# Patient Record
Sex: MALE | Race: WHITE | NOT HISPANIC OR LATINO | Employment: FULL TIME | URBAN - METROPOLITAN AREA
[De-identification: names, ages, dates, MRNs, and addresses within clinical notes are randomized per-mention and may not be internally consistent; named-entity substitution may affect disease eponyms.]

---

## 2021-03-03 ENCOUNTER — TELEPHONE (OUTPATIENT)
Dept: INTERNAL MEDICINE | Facility: CLINIC | Age: 61
End: 2021-03-03

## 2021-03-03 ENCOUNTER — OFFICE VISIT (OUTPATIENT)
Dept: INTERNAL MEDICINE | Facility: CLINIC | Age: 61
End: 2021-03-03

## 2021-03-03 VITALS
OXYGEN SATURATION: 95 % | SYSTOLIC BLOOD PRESSURE: 158 MMHG | HEART RATE: 71 BPM | TEMPERATURE: 97.1 F | BODY MASS INDEX: 33.38 KG/M2 | HEIGHT: 72 IN | WEIGHT: 246.4 LBS | DIASTOLIC BLOOD PRESSURE: 98 MMHG

## 2021-03-03 DIAGNOSIS — Z13.220 LIPID SCREENING: ICD-10-CM

## 2021-03-03 DIAGNOSIS — E66.9 OBESITY (BMI 30.0-34.9): ICD-10-CM

## 2021-03-03 DIAGNOSIS — I10 ESSENTIAL HYPERTENSION: ICD-10-CM

## 2021-03-03 DIAGNOSIS — Z11.59 NEED FOR HEPATITIS C SCREENING TEST: ICD-10-CM

## 2021-03-03 DIAGNOSIS — Z12.5 PROSTATE CANCER SCREENING: ICD-10-CM

## 2021-03-03 DIAGNOSIS — Z12.11 SCREEN FOR COLON CANCER: ICD-10-CM

## 2021-03-03 DIAGNOSIS — B35.1 FUNGAL TOENAIL INFECTION: ICD-10-CM

## 2021-03-03 DIAGNOSIS — Z13.1 SCREENING FOR DIABETES MELLITUS: ICD-10-CM

## 2021-03-03 DIAGNOSIS — H93.13 TINNITUS OF BOTH EARS: ICD-10-CM

## 2021-03-03 DIAGNOSIS — G47.33 OBSTRUCTIVE SLEEP APNEA SYNDROME: ICD-10-CM

## 2021-03-03 DIAGNOSIS — R01.1 SYSTOLIC MURMUR: Primary | ICD-10-CM

## 2021-03-03 DIAGNOSIS — H91.93 DECREASED HEARING OF BOTH EARS: ICD-10-CM

## 2021-03-03 DIAGNOSIS — Z86.010 HISTORY OF COLON POLYPS: ICD-10-CM

## 2021-03-03 PROBLEM — F84.5 ASPERGER SYNDROME: Status: RESOLVED | Noted: 2021-03-03 | Resolved: 2021-03-03

## 2021-03-03 PROBLEM — Z86.0100 HISTORY OF COLON POLYPS: Status: ACTIVE | Noted: 2021-03-03

## 2021-03-03 PROBLEM — F84.5 ASPERGER SYNDROME: Status: ACTIVE | Noted: 2021-03-03

## 2021-03-03 PROBLEM — E66.811 OBESITY (BMI 30.0-34.9): Status: ACTIVE | Noted: 2021-03-03

## 2021-03-03 PROCEDURE — 99205 OFFICE O/P NEW HI 60 MIN: CPT | Performed by: STUDENT IN AN ORGANIZED HEALTH CARE EDUCATION/TRAINING PROGRAM

## 2021-03-03 PROCEDURE — 93000 ELECTROCARDIOGRAM COMPLETE: CPT | Performed by: STUDENT IN AN ORGANIZED HEALTH CARE EDUCATION/TRAINING PROGRAM

## 2021-03-03 RX ORDER — LISINOPRIL 20 MG/1
20 TABLET ORAL DAILY
Qty: 30 TABLET | Refills: 2 | Status: SHIPPED | OUTPATIENT
Start: 2021-03-03 | End: 2021-03-03 | Stop reason: SDUPTHER

## 2021-03-03 RX ORDER — TERBINAFINE HYDROCHLORIDE 250 MG/1
250 TABLET ORAL DAILY
Qty: 30 TABLET | Refills: 2 | Status: SHIPPED | OUTPATIENT
Start: 2021-03-03 | End: 2021-04-09 | Stop reason: SDUPTHER

## 2021-03-03 RX ORDER — TERBINAFINE HYDROCHLORIDE 250 MG/1
250 TABLET ORAL DAILY
Qty: 30 TABLET | Refills: 2 | Status: SHIPPED | OUTPATIENT
Start: 2021-03-03 | End: 2021-03-03 | Stop reason: SDUPTHER

## 2021-03-03 RX ORDER — LISINOPRIL 20 MG/1
20 TABLET ORAL DAILY
Qty: 30 TABLET | Refills: 2 | Status: SHIPPED | OUTPATIENT
Start: 2021-03-03 | End: 2021-04-09 | Stop reason: SDUPTHER

## 2021-03-03 NOTE — PROGRESS NOTES
"History of Present Illness  Sigifredo Walsh is a 60 y.o. male presenting for Tinnitus and Nail Problem.     Patient is here to establish care. Lives with wife and 2 sons. Works as .  His son with Asperger is here today with him.    Patient had tinnitus for years, also noticed decreased hearing in social settings and watching TV.  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    He also has been noticing worsening toenail fungus of feet, worse on his right side.  He states he was on a 2-week course of oral medication in the past, but did not resolve.    He does have a past medical history of sleep apnea on CPAP, which he uses consistently.  Also osteoarthritis of the knee, diverticulosis and cataracts bilaterally.  He has had right inguinal hernia repair.  Also he has removed ganglion cyst of his right third finger in the 90s.    The following portions of the patient's history were reviewed and updated as appropriate: allergies, current medications, past family history, past medical history, past social history, past surgical history and problem list.    Review of Systems   Constitutional: Negative for fever.   HENT: Negative for congestion.    Eyes: Positive for visual disturbance.   Respiratory: Negative for shortness of breath.    Cardiovascular: Negative for chest pain.   Gastrointestinal: Negative for abdominal pain.   Genitourinary: Negative for dysuria.   Skin: Positive for rash.        Jock Itch   Neurological: Negative for weakness.   Psychiatric/Behavioral: Negative for depressed mood.       Objective  /98   Pulse 71   Temp 97.1 °F (36.2 °C) (Temporal)   Ht 181.6 cm (71.5\") Comment: per patient  Wt 112 kg (246 lb 6.4 oz)   SpO2 95%   BMI 33.89 kg/m²     Physical Exam  Vitals signs reviewed.   Constitutional:       Appearance: Normal appearance.   HENT:      Head: Normocephalic and atraumatic.      " Nose: Nose normal. No congestion.      Mouth/Throat:      Mouth: Mucous membranes are moist.   Eyes:      Extraocular Movements: Extraocular movements intact.      Conjunctiva/sclera: Conjunctivae normal.   Neck:      Musculoskeletal: Neck supple.   Cardiovascular:      Rate and Rhythm: Normal rate and regular rhythm.      Heart sounds: Murmur present.      Comments: Systolic murmur about 4/6 pct max over apex, radiating to left axilla.  Pulmonary:      Effort: Pulmonary effort is normal.      Breath sounds: Normal breath sounds.   Abdominal:      General: There is no distension.      Palpations: Abdomen is soft. There is no mass.      Tenderness: There is no abdominal tenderness.   Musculoskeletal:      Right lower leg: Edema present.      Left lower leg: Edema present.      Comments: Right foot toenails thickened with subungual hyperkeratosis. Minor changes left side.   Skin:     General: Skin is warm and dry.   Neurological:      Mental Status: He is alert and oriented to person, place, and time. Mental status is at baseline.   Psychiatric:         Behavior: Behavior normal.         Thought Content: Thought content normal.           ECG 12 Lead    Date/Time: 3/3/2021 11:05 AM  Performed by: Jasper Zhao MD  Authorized by: Jasper Zhao MD   Comparison: not compared with previous ECG   Previous ECG: no previous ECG available  Rate: normal  BPM: 62  Conduction: conduction normal  ST Segments: ST segments normal  T Waves: T waves normal  Other findings: left ventricular hypertrophy    Clinical impression: non-specific ECG  Comments: Possible LVH              Assessment/Plan   1. Systolic murmur  Patient denies history of murmur.  Possible mitral insufficiency.  No chest pain or near syncope.  EKG shows possible LVH.  Will refer for cardiology work-up.  - ECG 12 Lead  - Ambulatory Referral to Cardiology  - CBC & Differential; Future    2. Essential hypertension  BP Readings from Last 3 Encounters:    03/03/21 158/98   Meets criteria for hypertension, possible LVH.  - Comprehensive Metabolic Panel; Future  - lisinopril (PRINIVIL,ZESTRIL) 20 MG tablet; Take 1 tablet by mouth Daily.  Dispense: 30 tablet; Refill: 2    3. Lipid screening  Patient will return for fasting lipids.  - Lipid Panel; Future    4. Tinnitus of both ears  5. Decreased hearing of both ears  May be related to hearing loss.  No cerumen impaction.  Will refer to ENT.  - Ambulatory Referral to ENT (Otolaryngology)    6. Obstructive sleep apnea syndrome  Stable, continue on CPAP.    7. Fungal toenail infection  We will need at least 3 months of oral treatment.  Will return tomorrow for CMP, repeat CMP in 1 month.  Patient made aware of liver side effects.  - terbinafine (LamISIL) 250 MG tablet; Take 1 tablet by mouth Daily.  Dispense: 30 tablet; Refill: 2    8. Obesity (BMI 30.0-34.9)  Currently BMI 33.89.  Patient has gained weight during the pandemic, is counseled on weight loss, exercise, healthy diet including healthy plate, reducing portion sizes, avoiding snacking in between meals or instead eating healthy snacks.  Avoiding sugary drinks.    9. Screen for colon cancer  10. History of colon polyps  Had colonoscopy in 2014.  Will refer for repeat colonoscopy due to polyps.  - Amb referral for Screening Colonoscopy    11. Prostate cancer screening  Normal PSA in the past around 1.7-1.9.  Patient would like PSA screening.  Discussed pros and cons, including potentially unnecessary work-up, but with elevated PSA the benefit would be early discovery of cancer.  He would like to proceed with PSA screening.  - PSA Screen; Future    12. Need for hepatitis C screening test  Per new recommendations.  - Hepatitis C Antibody; Future    13. Screening for diabetes mellitus  - Hemoglobin A1c; Future    Total time spent 3 charting, charting and face-to-face with patient 70 minutes    Return in about 1 year (around 3/3/2022) for Annual physical.    Future  Appointments       Provider Department Center    4/7/2021 8:00 AM Jasper Zhao MD [facility]            Jasper Zhao MD  Family Medicine  03/03/2021    Note: Speech recognition transcription software may have been used to create portions of this document.  An attempt at proofreading has been made but errors in transcription could still be present.

## 2021-03-03 NOTE — TELEPHONE ENCOUNTER
PATIENT CALLED TO CHANGE HIS PHARMACY.    HE HAD AN APPOINTMENT TODAY AND 2 MEDICATIONS WAS CALLED INTO [facility] BUT HE NEEDS THAT CANCELLED AND THEY NEED TO BE SENT TO THE PHARMACY LISTED BELOW     lisinopril (PRINIVIL,ZESTRIL) 20 MG tablet  terbinafine (LamISIL) 250 MG tablet    [facility] [address] [facility] [phone] [facility] [phone] FX    PATIENT WOULD LIKE TO  THESE MEDICATIONS TODAY 03/03/21

## 2021-03-08 ENCOUNTER — LAB (OUTPATIENT)
Dept: LAB | Facility: HOSPITAL | Age: 61
End: 2021-03-08

## 2021-03-08 ENCOUNTER — TELEPHONE (OUTPATIENT)
Dept: INTERNAL MEDICINE | Facility: CLINIC | Age: 61
End: 2021-03-08

## 2021-03-08 DIAGNOSIS — R01.1 SYSTOLIC MURMUR: ICD-10-CM

## 2021-03-08 DIAGNOSIS — Z13.1 SCREENING FOR DIABETES MELLITUS: ICD-10-CM

## 2021-03-08 DIAGNOSIS — D72.829 LEUKOCYTOSIS, UNSPECIFIED TYPE: Primary | ICD-10-CM

## 2021-03-08 DIAGNOSIS — Z12.5 PROSTATE CANCER SCREENING: ICD-10-CM

## 2021-03-08 DIAGNOSIS — D72.829 LEUKOCYTOSIS, UNSPECIFIED TYPE: ICD-10-CM

## 2021-03-08 DIAGNOSIS — I10 ESSENTIAL HYPERTENSION: ICD-10-CM

## 2021-03-08 DIAGNOSIS — Z11.59 NEED FOR HEPATITIS C SCREENING TEST: ICD-10-CM

## 2021-03-08 DIAGNOSIS — D72.820 LYMPHOCYTOSIS: Primary | ICD-10-CM

## 2021-03-08 DIAGNOSIS — Z13.220 LIPID SCREENING: ICD-10-CM

## 2021-03-08 PROBLEM — R73.03 PREDIABETES: Status: ACTIVE | Noted: 2021-03-08

## 2021-03-08 LAB
ALBUMIN SERPL-MCNC: 4.1 G/DL (ref 3.5–5.2)
ALBUMIN/GLOB SERPL: 1.8 G/DL
ALP SERPL-CCNC: 49 U/L (ref 39–117)
ALT SERPL W P-5'-P-CCNC: 19 U/L (ref 1–41)
ANION GAP SERPL CALCULATED.3IONS-SCNC: 8.5 MMOL/L (ref 5–15)
AST SERPL-CCNC: 18 U/L (ref 1–40)
BILIRUB SERPL-MCNC: 0.5 MG/DL (ref 0–1.2)
BUN SERPL-MCNC: 16 MG/DL (ref 8–23)
BUN/CREAT SERPL: 15.2 (ref 7–25)
CALCIUM SPEC-SCNC: 9.1 MG/DL (ref 8.6–10.5)
CHLORIDE SERPL-SCNC: 105 MMOL/L (ref 98–107)
CHOLEST SERPL-MCNC: 101 MG/DL (ref 0–200)
CO2 SERPL-SCNC: 27.5 MMOL/L (ref 22–29)
CREAT SERPL-MCNC: 1.05 MG/DL (ref 0.76–1.27)
CRP SERPL-MCNC: <0.3 MG/DL (ref 0–0.5)
DEPRECATED RDW RBC AUTO: 43.5 FL (ref 37–54)
ERYTHROCYTE [DISTWIDTH] IN BLOOD BY AUTOMATED COUNT: 13 % (ref 12.3–15.4)
GFR SERPL CREATININE-BSD FRML MDRD: 72 ML/MIN/1.73
GLOBULIN UR ELPH-MCNC: 2.3 GM/DL
GLUCOSE SERPL-MCNC: 119 MG/DL (ref 65–99)
HBA1C MFR BLD: 6.49 % (ref 4.8–5.6)
HCT VFR BLD AUTO: 41 % (ref 37.5–51)
HCV AB SER DONR QL: NORMAL
HDLC SERPL-MCNC: 29 MG/DL (ref 40–60)
HGB BLD-MCNC: 14.1 G/DL (ref 13–17.7)
LDLC SERPL CALC-MCNC: 51 MG/DL (ref 0–100)
LDLC/HDLC SERPL: 1.7 {RATIO}
LYMPHOCYTES # BLD MANUAL: 32.04 10*3/MM3 (ref 0.7–3.1)
LYMPHOCYTES NFR BLD MANUAL: 5 % (ref 5–12)
LYMPHOCYTES NFR BLD MANUAL: 81 % (ref 19.6–45.3)
MCH RBC QN AUTO: 32 PG (ref 26.6–33)
MCHC RBC AUTO-ENTMCNC: 34.4 G/DL (ref 31.5–35.7)
MCV RBC AUTO: 93.2 FL (ref 79–97)
MONOCYTES # BLD AUTO: 1.98 10*3/MM3 (ref 0.1–0.9)
NEUTROPHILS # BLD AUTO: 4.35 10*3/MM3 (ref 1.7–7)
NEUTROPHILS NFR BLD MANUAL: 11 % (ref 42.7–76)
PATHOLOGY REVIEW: YES
PLAT MORPH BLD: NORMAL
PLATELET # BLD AUTO: 138 10*3/MM3 (ref 140–450)
PMV BLD AUTO: 10.3 FL (ref 6–12)
POTASSIUM SERPL-SCNC: 4.4 MMOL/L (ref 3.5–5.2)
PROT SERPL-MCNC: 6.4 G/DL (ref 6–8.5)
PSA SERPL-MCNC: 3.69 NG/ML (ref 0–4)
RBC # BLD AUTO: 4.4 10*6/MM3 (ref 4.14–5.8)
RBC MORPH BLD: NORMAL
SODIUM SERPL-SCNC: 141 MMOL/L (ref 136–145)
TRIGL SERPL-MCNC: 113 MG/DL (ref 0–150)
VARIANT LYMPHS NFR BLD MANUAL: 3 % (ref 0–5)
VLDLC SERPL-MCNC: 21 MG/DL (ref 5–40)
WBC # BLD AUTO: 39.56 10*3/MM3 (ref 3.4–10.8)
WBC MORPH BLD: NORMAL

## 2021-03-08 PROCEDURE — 86140 C-REACTIVE PROTEIN: CPT

## 2021-03-08 PROCEDURE — G0103 PSA SCREENING: HCPCS

## 2021-03-08 PROCEDURE — 85025 COMPLETE CBC W/AUTO DIFF WBC: CPT

## 2021-03-08 PROCEDURE — 83036 HEMOGLOBIN GLYCOSYLATED A1C: CPT

## 2021-03-08 PROCEDURE — 85007 BL SMEAR W/DIFF WBC COUNT: CPT

## 2021-03-08 PROCEDURE — 80053 COMPREHEN METABOLIC PANEL: CPT

## 2021-03-08 PROCEDURE — 36415 COLL VENOUS BLD VENIPUNCTURE: CPT

## 2021-03-08 PROCEDURE — 86803 HEPATITIS C AB TEST: CPT

## 2021-03-08 PROCEDURE — 80061 LIPID PANEL: CPT

## 2021-03-10 NOTE — PROGRESS NOTES
"Chief Complaint  Heart Murmur and Establish Care    Subjective    History of Present Illness {CC  Problem List  Visit  Diagnosis   Encounters  Notes  Medications  Labs  Result Review Imaging  Media :23}     Sigifredo Walsh presents to [facility] for   History of Present Illness   60-year-old male with prediabetes, sleep apnea who presents today for evaluation of systolic heart murmur at the request of Dr. Zhao.  Patient recently established care with Dr. Zhao and was found to have a systolic murmur as well as hypertension.  He was started on lisinopril 20 mg daily. EKG showed evidence of LVH.  He was found to have a concerning CBC with a white blood count of approximately 40 with atypical lymphocytosis.  He has been referred to hematology/oncology and sees them next week.   He reports that he has gained 20lbs over the past year and has had some MYERS. He relates this because he has been more sedentary since working from home. He notes intermittent swelling of his BLE depending on how much sodium he consumes, he says it goes away after he \"chugs\" some water.  He denies any orthopnea or PND as long as he wears his CPAP, which she does every night.  He denies any chest pain.  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  He has never had a cardiac evaluation that he is aware of.  He thinks at one point he was told he had a heart attack in the 1980s but he is not sure about the details surrounding this.  Never had a heart cath.  Has never had an echo.  No history of rheumatic fever.  Denies palpitations.  Objective     Vital Signs:   Vitals:    03/11/21 1306 03/11/21 1307 03/11/21 1308   BP: 134/84 141/83 145/83   BP Location: Right arm Left arm Left arm   Patient Position: Sitting Sitting Standing   Cuff Size: Adult Adult Adult   Pulse: 68 67 65   Resp:   14   Temp:   98.1 °F (36.7 °C) " "  TempSrc:   Temporal   SpO2: 96% 96% 96%   Weight:   111 kg (244 lb 2 oz)   Height:   181.6 cm (71.5\")     Body mass index is 33.57 kg/m².  Physical Exam  Vitals reviewed.   Constitutional:       Appearance: Normal appearance.   HENT:      Head: Normocephalic.   Eyes:      Extraocular Movements: Extraocular movements intact.      Pupils: Pupils are equal, round, and reactive to light.   Neck:      Vascular: No carotid bruit.   Cardiovascular:      Rate and Rhythm: Normal rate and regular rhythm.      Pulses: Normal pulses.      Heart sounds: S1 normal and S2 normal. Murmur present. Systolic murmur present with a grade of 3/6.   Pulmonary:      Effort: Pulmonary effort is normal. No respiratory distress.      Breath sounds: Normal breath sounds.   Chest:      Chest wall: No tenderness.   Abdominal:      General: Abdomen is flat. Bowel sounds are normal.      Palpations: Abdomen is soft.   Musculoskeletal:      Cervical back: Neck supple.      Right lower le+ Pitting Edema present.      Left lower le+ Pitting Edema present.   Skin:     General: Skin is warm and dry.   Neurological:      General: No focal deficit present.      Mental Status: He is alert and oriented to person, place, and time. Mental status is at baseline.   Psychiatric:         Mood and Affect: Mood normal.         Behavior: Behavior normal.         Thought Content: Thought content normal.              Result Review  Data Reviewed:{ Labs  Result Review  Imaging  Med Tab  Media :23}   ECG 12 Lead (2021 09:45)  Lipid Panel (2021 08:30)  Comprehensive Metabolic Panel (2021 08:30)  Hemoglobin A1c (2021 08:30)  Hepatitis C Antibody (2021 08:30)  PSA Screen (2021 08:30)  CBC & Differential (2021 08:30)  Manual Differential (2021 08:30)  C-reactive Protein (2021 08:30)    Consultant notes Dr. Zhao       EKG today shows normal sinus rhythm, heart rate 68, KS interval 180 ms,    "   Assessment and Plan {CC Problem List  Visit Diagnosis  ROS  Review (Popup)  Health Maintenance  Quality  BestPractice  Medications  SmartSets  SnapShot Encounters  Media :23}   1. Essential hypertension  Appears close to control. Will start chlorthalidone 25mg daily due to edema and for better blood pressure control.  Repeat BMP at time of echo or at follow-up with PCP  Advised him to start checking his blood pressures at home and bring his readings to his upcoming follow-up with his PCP  - Adult Transthoracic Echo Complete W/ Cont if Necessary Per Protocol; Future  - Basic Metabolic Panel; Future  - Magnesium; Future    2. Systolic murmur  We will get an echo in the next couple weeks, murmur highly suspicious for valvular heart disease.   - Adult Transthoracic Echo Complete W/ Cont if Necessary Per Protocol; Future    3. Abnormal EKG  Original EKG showed a supraventricular rhythm with unclear SC intervals and questionable delta waves.  Repeat EKG today shows normal sinus rhythm  - ECG 12 Lead; Future    4. MYERS (dyspnea on exertion)  - Adult Transthoracic Echo Complete W/ Cont if Necessary Per Protocol; Future  - Basic Metabolic Panel; Future  - proBNP; Future  - Magnesium; Future    5. Bilateral lower extremity edema  Start chlorthalidone.  May need to consider Lasix depending on how he responds to chlorthalidone and echo results.  - Adult Transthoracic Echo Complete W/ Cont if Necessary Per Protocol; Future  - proBNP; Future        Follow Up {Instructions Charge Capture  Follow-up Communications :23}   No follow-ups on file.    Patient was given instructions and counseling regarding his condition or for health maintenance advice. Please see specific information pulled into the AVS if appropriate.  Patient was instructed to call the [facility] with any questions, concerns, or worsening symptoms.    *Please note that portions of this note were completed with a voice recognition program.  Efforts were made to edit the dictations, but occasionally words are mistranscribed.

## 2021-03-11 ENCOUNTER — HOSPITAL ENCOUNTER (OUTPATIENT)
Dept: CARDIOLOGY | Facility: HOSPITAL | Age: 61
Discharge: HOME | End: 2021-03-11

## 2021-03-11 ENCOUNTER — OFFICE VISIT (OUTPATIENT)
Dept: CARDIOLOGY | Facility: HOSPITAL | Age: 61
End: 2021-03-11

## 2021-03-11 VITALS
WEIGHT: 244.13 LBS | HEIGHT: 72 IN | SYSTOLIC BLOOD PRESSURE: 145 MMHG | TEMPERATURE: 98.1 F | RESPIRATION RATE: 14 BRPM | BODY MASS INDEX: 33.07 KG/M2 | DIASTOLIC BLOOD PRESSURE: 83 MMHG | HEART RATE: 65 BPM | OXYGEN SATURATION: 96 %

## 2021-03-11 DIAGNOSIS — R94.31 ABNORMAL EKG: ICD-10-CM

## 2021-03-11 DIAGNOSIS — R06.09 DOE (DYSPNEA ON EXERTION): ICD-10-CM

## 2021-03-11 DIAGNOSIS — R60.0 BILATERAL LOWER EXTREMITY EDEMA: ICD-10-CM

## 2021-03-11 DIAGNOSIS — R01.1 SYSTOLIC MURMUR: ICD-10-CM

## 2021-03-11 DIAGNOSIS — I10 ESSENTIAL HYPERTENSION: Primary | ICD-10-CM

## 2021-03-11 PROCEDURE — 99204 OFFICE O/P NEW MOD 45 MIN: CPT | Performed by: NURSE PRACTITIONER

## 2021-03-11 RX ORDER — CHLORTHALIDONE 25 MG/1
25 TABLET ORAL DAILY
Qty: 30 TABLET | Refills: 0 | Status: SHIPPED | OUTPATIENT
Start: 2021-03-11 | End: 2021-04-09 | Stop reason: SDUPTHER

## 2021-03-12 LAB
LAB AP CASE REPORT: NORMAL
PATH REPORT.ADDENDUM SPEC: NORMAL
PATH REPORT.FINAL DX SPEC: NORMAL
PATH REPORT.GROSS SPEC: NORMAL

## 2021-03-17 PROBLEM — H90.3 SENSORINEURAL HEARING LOSS (SNHL) OF BOTH EARS: Status: ACTIVE | Noted: 2021-03-17

## 2021-03-18 ENCOUNTER — TELEPHONE (OUTPATIENT)
Dept: CARDIOLOGY | Facility: HOSPITAL | Age: 61
End: 2021-03-18

## 2021-03-18 ENCOUNTER — HOSPITAL ENCOUNTER (OUTPATIENT)
Dept: CARDIOLOGY | Facility: HOSPITAL | Age: 61
Discharge: HOME | End: 2021-03-18
Admitting: NURSE PRACTITIONER

## 2021-03-18 VITALS — HEIGHT: 71 IN | WEIGHT: 244 LBS | BODY MASS INDEX: 34.16 KG/M2

## 2021-03-18 DIAGNOSIS — R06.09 DOE (DYSPNEA ON EXERTION): ICD-10-CM

## 2021-03-18 DIAGNOSIS — R01.1 SYSTOLIC MURMUR: ICD-10-CM

## 2021-03-18 DIAGNOSIS — I10 ESSENTIAL HYPERTENSION: ICD-10-CM

## 2021-03-18 DIAGNOSIS — R60.0 BILATERAL LOWER EXTREMITY EDEMA: ICD-10-CM

## 2021-03-18 PROBLEM — I34.0 NONRHEUMATIC MITRAL VALVE REGURGITATION: Status: ACTIVE | Noted: 2021-03-03

## 2021-03-18 LAB
BH CV ECHO MEAS - AI DEC SLOPE: 228 CM/SEC^2
BH CV ECHO MEAS - AI MAX PG: 80.6 MMHG
BH CV ECHO MEAS - AI MAX VEL: 449 CM/SEC
BH CV ECHO MEAS - AI P1/2T: 576.8 MSEC
BH CV ECHO MEAS - AO ROOT AREA (BSA CORRECTED): 1.7
BH CV ECHO MEAS - AO ROOT AREA: 11.9 CM^2
BH CV ECHO MEAS - AO ROOT DIAM: 3.9 CM
BH CV ECHO MEAS - BSA(HAYCOCK): 2.4 M^2
BH CV ECHO MEAS - BSA: 2.3 M^2
BH CV ECHO MEAS - BZI_BMI: 34 KILOGRAMS/M^2
BH CV ECHO MEAS - BZI_METRIC_HEIGHT: 180.3 CM
BH CV ECHO MEAS - BZI_METRIC_WEIGHT: 110.7 KG
BH CV ECHO MEAS - EDV(CUBED): 152.3 ML
BH CV ECHO MEAS - EDV(MOD-SP2): 165 ML
BH CV ECHO MEAS - EDV(MOD-SP4): 227 ML
BH CV ECHO MEAS - EDV(TEICH): 137.7 ML
BH CV ECHO MEAS - EF(CUBED): 73.7 %
BH CV ECHO MEAS - EF(MOD-SP2): 47.9 %
BH CV ECHO MEAS - EF(MOD-SP4): 50.7 %
BH CV ECHO MEAS - EF(TEICH): 65.1 %
BH CV ECHO MEAS - ESV(CUBED): 40 ML
BH CV ECHO MEAS - ESV(MOD-SP2): 86 ML
BH CV ECHO MEAS - ESV(MOD-SP4): 112 ML
BH CV ECHO MEAS - ESV(TEICH): 48.1 ML
BH CV ECHO MEAS - FS: 36 %
BH CV ECHO MEAS - IVS/LVPW: 1
BH CV ECHO MEAS - IVSD: 1.2 CM
BH CV ECHO MEAS - LA DIMENSION: 4 CM
BH CV ECHO MEAS - LA/AO: 1
BH CV ECHO MEAS - LAD MAJOR: 5.4 CM
BH CV ECHO MEAS - LAT PEAK E' VEL: 10.2 CM/SEC
BH CV ECHO MEAS - LATERAL E/E' RATIO: 8.5
BH CV ECHO MEAS - LV DIASTOLIC VOL/BSA (35-75): 98.9 ML/M^2
BH CV ECHO MEAS - LV MASS(C)D: 242 GRAMS
BH CV ECHO MEAS - LV MASS(C)DI: 105.5 GRAMS/M^2
BH CV ECHO MEAS - LV MAX PG: 3.6 MMHG
BH CV ECHO MEAS - LV MEAN PG: 2 MMHG
BH CV ECHO MEAS - LV SYSTOLIC VOL/BSA (12-30): 48.8 ML/M^2
BH CV ECHO MEAS - LV V1 MAX: 94.7 CM/SEC
BH CV ECHO MEAS - LV V1 MEAN: 65.4 CM/SEC
BH CV ECHO MEAS - LV V1 VTI: 19.7 CM
BH CV ECHO MEAS - LVIDD: 5.3 CM
BH CV ECHO MEAS - LVIDS: 3.4 CM
BH CV ECHO MEAS - LVLD AP2: 9 CM
BH CV ECHO MEAS - LVLD AP4: 9.3 CM
BH CV ECHO MEAS - LVLS AP2: 7.7 CM
BH CV ECHO MEAS - LVLS AP4: 7.7 CM
BH CV ECHO MEAS - LVOT AREA (M): 3.8 CM^2
BH CV ECHO MEAS - LVOT AREA: 3.8 CM^2
BH CV ECHO MEAS - LVOT DIAM: 2.2 CM
BH CV ECHO MEAS - LVPWD: 1.1 CM
BH CV ECHO MEAS - MED PEAK E' VEL: 8.4 CM/SEC
BH CV ECHO MEAS - MEDIAL E/E' RATIO: 10.2
BH CV ECHO MEAS - MR ALIAS VEL: 39 CM/SEC
BH CV ECHO MEAS - MR ERO: 0.2 CM^2
BH CV ECHO MEAS - MR FLOW RATE: 74.1 CM^3/SEC
BH CV ECHO MEAS - MR MAX PG: 54 MMHG
BH CV ECHO MEAS - MR MAX VEL: 368 CM/SEC
BH CV ECHO MEAS - MR MEAN PG: 30 MMHG
BH CV ECHO MEAS - MR MEAN VEL: 260 CM/SEC
BH CV ECHO MEAS - MR PISA RADIUS: 0.55 CM
BH CV ECHO MEAS - MR PISA: 1.9 CM^2
BH CV ECHO MEAS - MR VOLUME: 20.9 ML
BH CV ECHO MEAS - MR VTI: 104 CM
BH CV ECHO MEAS - MV A MAX VEL: 67.6 CM/SEC
BH CV ECHO MEAS - MV AREA (1 DIAM): 3.8 CM^2
BH CV ECHO MEAS - MV DEC SLOPE: 347 CM/SEC^2
BH CV ECHO MEAS - MV DEC TIME: 0.24 SEC
BH CV ECHO MEAS - MV DIAM: 2.2 CM
BH CV ECHO MEAS - MV E MAX VEL: 86.4 CM/SEC
BH CV ECHO MEAS - MV E/A: 1.3
BH CV ECHO MEAS - MV FLOW AREA(1DIAM): 3.8 CM^2
BH CV ECHO MEAS - MV MAX PG: 7 MMHG
BH CV ECHO MEAS - MV MEAN PG: 3 MMHG
BH CV ECHO MEAS - MV P1/2T MAX VEL: 128 CM/SEC
BH CV ECHO MEAS - MV P1/2T: 108 MSEC
BH CV ECHO MEAS - MV V2 MAX: 132 CM/SEC
BH CV ECHO MEAS - MV V2 MEAN: 75.7 CM/SEC
BH CV ECHO MEAS - MV V2 VTI: 45.2 CM
BH CV ECHO MEAS - MVA P1/2T LCG: 1.7 CM^2
BH CV ECHO MEAS - MVA(P1/2T): 2 CM^2
BH CV ECHO MEAS - MVA(VTI): 1.7 CM^2
BH CV ECHO MEAS - PA ACC SLOPE: 634 CM/SEC^2
BH CV ECHO MEAS - PA ACC TIME: 0.14 SEC
BH CV ECHO MEAS - PA PR(ACCEL): 15.6 MMHG
BH CV ECHO MEAS - RAP SYSTOLE: 3 MMHG
BH CV ECHO MEAS - RF(MV,LVOT)(1DIAM): 0.56
BH CV ECHO MEAS - RVSP: 25 MMHG
BH CV ECHO MEAS - SI(CUBED): 48.9 ML/M^2
BH CV ECHO MEAS - SI(LVOT): 32.6 ML/M^2
BH CV ECHO MEAS - SI(MOD-SP2): 34.4 ML/M^2
BH CV ECHO MEAS - SI(MOD-SP4): 50.1 ML/M^2
BH CV ECHO MEAS - SI(MV 1 DIAM): 74.9 ML/M^2
BH CV ECHO MEAS - SI(TEICH): 39 ML/M^2
BH CV ECHO MEAS - SV(CUBED): 112.3 ML
BH CV ECHO MEAS - SV(LVOT): 74.9 ML
BH CV ECHO MEAS - SV(MOD-SP2): 79 ML
BH CV ECHO MEAS - SV(MOD-SP4): 115 ML
BH CV ECHO MEAS - SV(MV 1 DIAM): 171.8 ML
BH CV ECHO MEAS - SV(TEICH): 89.6 ML
BH CV ECHO MEAS - TAPSE (>1.6): 3.1 CM
BH CV ECHO MEAS - TR MAX PG: 22 MMHG
BH CV ECHO MEAS - TR MAX VEL: 236.8 CM/SEC
BH CV ECHO MEASUREMENTS AVERAGE E/E' RATIO: 9.29
BH CV VAS BP LEFT ARM: NORMAL MMHG
BH CV XLRA - RV BASE: 4.6 CM
BH CV XLRA - RV LENGTH: 5.8 CM
BH CV XLRA - RV MID: 3.5 CM
BH CV XLRA - TDI S': 15.4 CM/SEC
LEFT ATRIUM VOLUME INDEX: 37.9 ML/M^2
LEFT ATRIUM VOLUME: 87 ML
MAXIMAL PREDICTED HEART RATE: 158 BPM
STRESS TARGET HR: 134 BPM

## 2021-03-18 PROCEDURE — 93306 TTE W/DOPPLER COMPLETE: CPT

## 2021-03-18 PROCEDURE — 93306 TTE W/DOPPLER COMPLETE: CPT | Performed by: INTERNAL MEDICINE

## 2021-03-18 NOTE — TELEPHONE ENCOUNTER
Called patient with the echo results. EF normal, mild to moderate valve regurgitation. He says he is doing much better on the chlorthalidone. Swelling as resolved. He says he only checks BP occasionally at home, most of the time it is 140 systolic, but he believes it is improving.  He reports that he feels great and has not felt this well in years.  He is scheduled to see Dr. Saunders with oncology tomorrow.  He plans to have labs at that time.  Advised to continue to monitor blood pressure.  Discussed the potential of possibly need to increase lisinopril and his wife is very against this because she feels that this was the culprit of his swelling to begin with.  They both would prefer to continue to monitor the blood pressure until he sees Dr. Zhao, which I think is reasonable.  I advised him to call me with any new or worsening symptoms.  Discussed repeating echo possibly in about a year just to monitor the mitral valve.  He verbalized understanding with no further questions or concerns

## 2021-03-19 ENCOUNTER — CONSULT (OUTPATIENT)
Dept: ONCOLOGY | Facility: CLINIC | Age: 61
End: 2021-03-19

## 2021-03-19 ENCOUNTER — TELEPHONE (OUTPATIENT)
Dept: CARDIOLOGY | Facility: HOSPITAL | Age: 61
End: 2021-03-19

## 2021-03-19 ENCOUNTER — LAB (OUTPATIENT)
Dept: LAB | Facility: HOSPITAL | Age: 61
End: 2021-03-19

## 2021-03-19 VITALS
BODY MASS INDEX: 33.32 KG/M2 | OXYGEN SATURATION: 96 % | DIASTOLIC BLOOD PRESSURE: 71 MMHG | SYSTOLIC BLOOD PRESSURE: 124 MMHG | HEIGHT: 71 IN | TEMPERATURE: 96.9 F | HEART RATE: 62 BPM | WEIGHT: 238 LBS | RESPIRATION RATE: 18 BRPM

## 2021-03-19 DIAGNOSIS — C91.10 CLL (CHRONIC LYMPHOCYTIC LEUKEMIA) (HCC): Primary | ICD-10-CM

## 2021-03-19 DIAGNOSIS — C91.10 CLL (CHRONIC LYMPHOCYTIC LEUKEMIA) (HCC): ICD-10-CM

## 2021-03-19 DIAGNOSIS — M19.90 ARTHRITIS: ICD-10-CM

## 2021-03-19 DIAGNOSIS — R06.09 DOE (DYSPNEA ON EXERTION): ICD-10-CM

## 2021-03-19 DIAGNOSIS — I10 ESSENTIAL HYPERTENSION: ICD-10-CM

## 2021-03-19 DIAGNOSIS — R60.0 BILATERAL LOWER EXTREMITY EDEMA: ICD-10-CM

## 2021-03-19 LAB
ALBUMIN SERPL-MCNC: 4.2 G/DL (ref 3.5–5.2)
ALBUMIN/GLOB SERPL: 1.6 G/DL
ALP SERPL-CCNC: 54 U/L (ref 39–117)
ALT SERPL W P-5'-P-CCNC: 18 U/L (ref 1–41)
ANION GAP SERPL CALCULATED.3IONS-SCNC: 5 MMOL/L (ref 5–15)
AST SERPL-CCNC: 15 U/L (ref 1–40)
B2 MICROGLOB SERPL-MCNC: 3.5 MG/L (ref 0.8–2.2)
BILIRUB SERPL-MCNC: 0.6 MG/DL (ref 0–1.2)
BUN SERPL-MCNC: 21 MG/DL (ref 8–23)
BUN/CREAT SERPL: 17.6 (ref 7–25)
CALCIUM SPEC-SCNC: 9.6 MG/DL (ref 8.6–10.5)
CHLORIDE SERPL-SCNC: 101 MMOL/L (ref 98–107)
CHROMATIN AB SERPL-ACNC: <10 IU/ML (ref 0–14)
CO2 SERPL-SCNC: 32 MMOL/L (ref 22–29)
CREAT SERPL-MCNC: 1.19 MG/DL (ref 0.76–1.27)
CRP SERPL-MCNC: 0.38 MG/DL (ref 0–0.5)
DAT POLY-SP REAG RBC QL: NEGATIVE
ERYTHROCYTE [DISTWIDTH] IN BLOOD BY AUTOMATED COUNT: 13.1 % (ref 12.3–15.4)
ERYTHROCYTE [SEDIMENTATION RATE] IN BLOOD: <1 MM/HR (ref 0–20)
GFR SERPL CREATININE-BSD FRML MDRD: 62 ML/MIN/1.73
GLOBULIN UR ELPH-MCNC: 2.6 GM/DL
GLUCOSE SERPL-MCNC: 112 MG/DL (ref 65–99)
HCT VFR BLD AUTO: 42.1 % (ref 37.5–51)
HGB BLD-MCNC: 14.2 G/DL (ref 13–17.7)
LDH SERPL-CCNC: 144 U/L (ref 135–225)
LYMPHOCYTES # BLD AUTO: 26.5 10*3/MM3 (ref 0.7–3.1)
LYMPHOCYTES NFR BLD AUTO: 71.9 % (ref 19.6–45.3)
MAGNESIUM SERPL-MCNC: 2.1 MG/DL (ref 1.6–2.4)
MCH RBC QN AUTO: 31.1 PG (ref 26.6–33)
MCHC RBC AUTO-ENTMCNC: 33.7 G/DL (ref 31.5–35.7)
MCV RBC AUTO: 92.2 FL (ref 79–97)
MONOCYTES # BLD AUTO: 2.5 10*3/MM3 (ref 0.1–0.9)
MONOCYTES NFR BLD AUTO: 6.8 % (ref 5–12)
NEUTROPHILS NFR BLD AUTO: 21.3 % (ref 42.7–76)
NEUTROPHILS NFR BLD AUTO: 7.9 10*3/MM3 (ref 1.7–7)
NT-PROBNP SERPL-MCNC: 6.1 PG/ML (ref 0–900)
PLATELET # BLD AUTO: 154 10*3/MM3 (ref 140–450)
PMV BLD AUTO: 7.9 FL (ref 6–12)
POTASSIUM SERPL-SCNC: 5.2 MMOL/L (ref 3.5–5.2)
PROT SERPL-MCNC: 6.8 G/DL (ref 6–8.5)
RBC # BLD AUTO: 4.57 10*6/MM3 (ref 4.14–5.8)
SODIUM SERPL-SCNC: 138 MMOL/L (ref 136–145)
URATE SERPL-MCNC: 6.8 MG/DL (ref 3.4–7)
WBC # BLD AUTO: 36.9 10*3/MM3 (ref 3.4–10.8)

## 2021-03-19 PROCEDURE — 83880 ASSAY OF NATRIURETIC PEPTIDE: CPT

## 2021-03-19 PROCEDURE — 80053 COMPREHEN METABOLIC PANEL: CPT

## 2021-03-19 PROCEDURE — 83735 ASSAY OF MAGNESIUM: CPT

## 2021-03-19 PROCEDURE — 86431 RHEUMATOID FACTOR QUANT: CPT

## 2021-03-19 PROCEDURE — 86880 COOMBS TEST DIRECT: CPT

## 2021-03-19 PROCEDURE — 99205 OFFICE O/P NEW HI 60 MIN: CPT | Performed by: INTERNAL MEDICINE

## 2021-03-19 PROCEDURE — 82232 ASSAY OF BETA-2 PROTEIN: CPT

## 2021-03-19 PROCEDURE — 86038 ANTINUCLEAR ANTIBODIES: CPT

## 2021-03-19 PROCEDURE — 83615 LACTATE (LD) (LDH) ENZYME: CPT

## 2021-03-19 PROCEDURE — 86140 C-REACTIVE PROTEIN: CPT

## 2021-03-19 PROCEDURE — 84550 ASSAY OF BLOOD/URIC ACID: CPT

## 2021-03-19 PROCEDURE — 36415 COLL VENOUS BLD VENIPUNCTURE: CPT

## 2021-03-19 PROCEDURE — 85025 COMPLETE CBC W/AUTO DIFF WBC: CPT

## 2021-03-19 PROCEDURE — 85652 RBC SED RATE AUTOMATED: CPT

## 2021-03-19 NOTE — PROGRESS NOTES
CHIEF COMPLAINT: CLL    REASON FOR REFERRAL: Probable CLL      RECORDS OBTAINED  Records of the patients history including those obtained from primary care were reviewed and summarized in detail.    Oncology/Hematology History Overview Note   1.  CLL  2.  Tinnitus  3.  Fungal nail infection  4.  Osteoarthritis  5.  Sleep apnea on CPAP  6.  History of right inguinal herniorrhaphy  7.  Diverticulosis  8.  Hypertension    Hematology history timeline:  -3/8/2021 white count 39,560 with hemoglobin 14.1, platelets 138,000, 81% lymphocytes.  Peripheral smear shows smudge cells with atypical lymphocytes with enlarged nuclei with inconspicuous nucleoli.  An adequate volume for flow cytometry on initial blood draw.  C-reactive protein less than 0.3.  PSA normal 3.69 glucose 119 otherwise unremarkable CMP.  -3/19/2021 [facility] hematology oncology initial consultation: We will get peripheral blood flow cytometry flow cytometric hematolymphoid neoplasia assessment including ZAP70/CD38, cytogenetics, B-cell rearrangement, immunoglobulin variable heavy chain and p53 mutational analysis sequencing will be done, as well as snip MicroArray for CLL.  Absolute neutrophil count is normal at 4350 with absolute lymphocyte count 32,040.  Without frequent infections I will not check quantitative immunoglobulins.  CT scans are not necessary for diagnosis, surveillance, routine monitoring of treatment response, or progression.  CT scans may be warranted if symptoms of bulky disease or the assessment of risk for tumor lysis syndrome prior to initiation of venetoclax might be considered.  We will check lactate dehydrogenase, beta-2 microglobulin and uric acid.  Lymphocytosis itself is not an indication for treatment.  NCCN guideline indications for treatment include:  Fatigue severe  Night sweats  Unexplained weight loss  Fever without infection  Threatened endorgan function due to bulk  Spleen greater than 6 cm below costal margin  Lymph nodes  greater than 10 cm  Progressive nonhemolytic anemia (hemolytic anemia treated with steroids)  Progressive nonimmune mediated destruction thrombocytopenia (ITP)  Steroid refractory cytopenias    If these thresholds are reached, the molecular mutational analysis I am doing will guide the specific therapy I recommend.     CLL (chronic lymphocytic leukemia) (CMS/HCC)   3/19/2021 Initial Diagnosis    CLL (chronic lymphocytic leukemia) (CMS/HCC)         HISTORY OF PRESENT ILLNESS:  The patient is a 60 y.o.  male, referred for probable CLL.  He also has painful swollen MCP and PIP joints with brothers who have psoriatic arthritis.  He has multiple sebaceous cysts on his back.  He also has subungual fungal infections for which she is on systemic therapy.    REVIEW OF SYSTEMS:  No bed drenching night sweats or unexplained fevers or unexplained weight loss.  No rashes.  No easy bruising or bleeding.  No change in the color caliber or consistency of his stools    Past Medical History:   Diagnosis Date   • Cataract     Bilateral   • Diverticulosis    • Lymphocytosis 3/8/2021    3/2021. WBC 39.56. Abs lymphocyte 38. Plt 138. Hgb 14.1. Referral to hematology. CLL?   • Nonrheumatic mitral valve regurgitation 3/3/2021    Systolic murmur 4/6 noted 3/2021. TTE EF 56 - 60%. Mild to moderate mitral valve regurgitation, eccentric-anteriorly directed   • Osteoarthritis of knee    • Prediabetes 3/8/2021    3/2021 A1c 6.49   • Sensorineural hearing loss (SNHL) of both ears 3/17/2021    3/2021 ENT. Also tinnitus. Recommended hearing aids   • Sleep apnea     On CPAP consistently     Past Surgical History:   Procedure Laterality Date   • FINGER GANGLION CYST EXCISION Right     3rd finger in the 90s   • INGUINAL HERNIA REPAIR Right        Current Outpatient Medications on File Prior to Visit   Medication Sig Dispense Refill   • chlorthalidone (HYGROTON) 25 MG tablet Take 1 tablet by mouth Daily. 30 tablet 0   • lisinopril (PRINIVIL,ZESTRIL) 20  MG tablet Take 1 tablet by mouth Daily. 30 tablet 2   • terbinafine (LamISIL) 250 MG tablet Take 1 tablet by mouth Daily. 30 tablet 2     No current facility-administered medications on file prior to visit.       No Known Allergies    Social History     Socioeconomic History   • Marital status:      Spouse name: Not on file   • Number of children: Not on file   • Years of education: Not on file   • Highest education level: Not on file   Tobacco Use   • Smoking status: Never Smoker   • Smokeless tobacco: Never Used   Substance and Sexual Activity   • Alcohol use: Yes     Comment: 1 drink weekly   • Drug use: Never   • Sexual activity: Defer       Family History   Problem Relation Age of Onset   • Arthritis Mother    • Obesity Mother    • Hypertension Mother    • Arthritis Brother    • Migraines Brother    • Arthritis Maternal Grandmother    • Diabetes Maternal Grandmother    • Obesity Maternal Grandmother    • Stroke Maternal Grandmother    • Hypertension Paternal Grandmother        PHYSICAL EXAM:  Sebaceous cyst on his back.  No palpable cervical axillary or inguinal adenopathy  Abdominal exam reveals no hepatosplenomegaly    Assessment/Plan   1.  CLL  2.  Tinnitus  3.  Fungal nail infection  4.  Osteoarthritis  5.  Sleep apnea on CPAP  6.  History of right inguinal herniorrhaphy  7.  Diverticulosis  8.  Hypertension    Hematology history timeline:  -3/8/2021 white count 39,560 with hemoglobin 14.1, platelets 138,000, 81% lymphocytes.  Peripheral smear shows smudge cells with atypical lymphocytes with enlarged nuclei with inconspicuous nucleoli.  An adequate volume for flow cytometry on initial blood draw.  C-reactive protein less than 0.3.  PSA normal 3.69 glucose 119 otherwise unremarkable CMP.  -3/19/2021 [facility] hematology oncology initial consultation: We will get peripheral blood flow cytometry flow cytometric hematolymphoid neoplasia assessment including ZAP70/CD38, cytogenetics, B-cell rearrangement,  immunoglobulin variable heavy chain and p53 mutational analysis sequencing will be done, as well as snip MicroArray for CLL.  Absolute neutrophil count is normal at 4350 with absolute lymphocyte count 32,040.  Without frequent infections I will not check quantitative immunoglobulins.  CT scans are not necessary for diagnosis, surveillance, routine monitoring of treatment response, or progression.  CT scans may be warranted if symptoms of bulky disease or the assessment of risk for tumor lysis syndrome prior to initiation of venetoclax might be considered.  We will check lactate dehydrogenase, beta-2 microglobulin and uric acid.  Lymphocytosis itself is not an indication for treatment.  NCCN guideline indications for treatment include:  Fatigue severe  Night sweats  Unexplained weight loss  Fever without infection  Threatened endorgan function due to bulk  Spleen greater than 6 cm below costal margin  Lymph nodes greater than 10 cm  Progressive nonhemolytic anemia hemoglobin less than 10 (hemolytic anemia treated with steroids)  Progressive nonimmune mediated destruction thrombocytopenia platelets less than 100,000 (ITP)  Steroid refractory cytopenias    If these thresholds are reached, the molecular mutational analysis I am doing will guide the specific therapy I recommend.  Presently he does not have any of those clinical thresholds.  He does have swollen MCP and PIP joints with brothers who have psoriatic arthritis and I will check his sedimentation rate, C-reactive protein, rheumatoid factor, and extractable nuclear antigens as well and ultimately may need rheumatological referral.  Rheumatologic diseases can cause some lymphocytosis including monoclonal lymphocytic process but this would be higher lymphocyte count than I would expect from such processes.  Total time of care reviewing his past medical records, examining him, going over the complex decision tree of deciding how this is diagnosed, when and if to  treat, and potential brief explanation of therapies took 1 hour of total patient care time today.      Levi Saunders MD    3/19/2021

## 2021-03-19 NOTE — TELEPHONE ENCOUNTER
Lab Results   Component Value Date    GLUCOSE 112 (H) 03/19/2021    BUN 21 03/19/2021    CREATININE 1.19 03/19/2021    EGFRIFNONA 62 03/19/2021    BCR 17.6 03/19/2021    K 5.2 03/19/2021    CO2 32.0 (H) 03/19/2021    CALCIUM 9.6 03/19/2021    ALBUMIN 4.20 03/19/2021    AST 15 03/19/2021    ALT 18 03/19/2021     Called patient and advised of lab results. His potassium is normal but high normal. He says he is not taking any potassium supplements.  I advised him that lisinopril sometimes can cause this and that his potassium needs be closely monitored by his PCP.  He verbalized understanding with no further questions or concerns

## 2021-03-22 ENCOUNTER — TELEPHONE (OUTPATIENT)
Dept: ONCOLOGY | Facility: CLINIC | Age: 61
End: 2021-03-22

## 2021-03-22 LAB — ANA TITR SER IF: NEGATIVE {TITER}

## 2021-03-22 NOTE — TELEPHONE ENCOUNTER
Called back and was told the specimen was labeled with the patients name on the printed label but handwritten information that was on the tube did not match.  Also that hematopathology was marked on the order form instead of peripheral blood.  Corrected form and placed it along with the verification form for Eli to sign tomorrow when she returns to the office and fax back.

## 2021-03-22 NOTE — TELEPHONE ENCOUNTER
Bridgette with [facility] called she needs a call back to clarify the order and specimen that was sent over.

## 2021-03-23 LAB — REF LAB TEST RESULTS: NORMAL

## 2021-03-24 ENCOUNTER — TELEPHONE (OUTPATIENT)
Dept: ONCOLOGY | Facility: CLINIC | Age: 61
End: 2021-03-24

## 2021-03-24 LAB — REF LAB TEST METHOD: NORMAL

## 2021-03-24 NOTE — TELEPHONE ENCOUNTER
Bridgette from [facility] called. She said that she confirmed that ALL of the patient's samples were labelled incorrectly. [phone] ref # [id number]

## 2021-03-25 ENCOUNTER — IMMUNIZATION (OUTPATIENT)
Dept: VACCINE CLINIC | Facility: HOSPITAL | Age: 61
End: 2021-03-25

## 2021-03-25 LAB — REF LAB TEST METHOD: NORMAL

## 2021-03-25 PROCEDURE — 0001A: CPT | Performed by: INTERNAL MEDICINE

## 2021-03-25 PROCEDURE — 91300 HC SARSCOV02 VAC 30MCG/0.3ML IM: CPT | Performed by: INTERNAL MEDICINE

## 2021-03-26 LAB — REF LAB TEST RESULTS: NORMAL

## 2021-03-29 LAB
REF LAB TEST METHOD: NORMAL
REF LAB TEST RESULTS: NORMAL

## 2021-03-30 LAB — REF LAB TEST RESULTS: NORMAL

## 2021-04-09 ENCOUNTER — OFFICE VISIT (OUTPATIENT)
Dept: INTERNAL MEDICINE | Facility: CLINIC | Age: 61
End: 2021-04-09

## 2021-04-09 ENCOUNTER — OFFICE VISIT (OUTPATIENT)
Dept: ONCOLOGY | Facility: CLINIC | Age: 61
End: 2021-04-09

## 2021-04-09 VITALS
RESPIRATION RATE: 18 BRPM | HEART RATE: 62 BPM | DIASTOLIC BLOOD PRESSURE: 81 MMHG | TEMPERATURE: 98 F | OXYGEN SATURATION: 97 % | WEIGHT: 238 LBS | BODY MASS INDEX: 33.32 KG/M2 | SYSTOLIC BLOOD PRESSURE: 123 MMHG | HEIGHT: 71 IN

## 2021-04-09 VITALS
SYSTOLIC BLOOD PRESSURE: 134 MMHG | HEIGHT: 71 IN | WEIGHT: 236.2 LBS | OXYGEN SATURATION: 97 % | HEART RATE: 70 BPM | TEMPERATURE: 97.7 F | BODY MASS INDEX: 33.07 KG/M2 | DIASTOLIC BLOOD PRESSURE: 80 MMHG

## 2021-04-09 DIAGNOSIS — C91.10 CLL (CHRONIC LYMPHOCYTIC LEUKEMIA) (HCC): Primary | Chronic | ICD-10-CM

## 2021-04-09 DIAGNOSIS — C91.10 CLL (CHRONIC LYMPHOCYTIC LEUKEMIA) (HCC): ICD-10-CM

## 2021-04-09 DIAGNOSIS — Z00.00 WELL ADULT EXAM: Primary | ICD-10-CM

## 2021-04-09 DIAGNOSIS — M17.10 PRIMARY OSTEOARTHRITIS OF KNEE, UNSPECIFIED LATERALITY: ICD-10-CM

## 2021-04-09 DIAGNOSIS — Z86.010 HISTORY OF COLON POLYPS: ICD-10-CM

## 2021-04-09 DIAGNOSIS — B35.1 FUNGAL TOENAIL INFECTION: ICD-10-CM

## 2021-04-09 DIAGNOSIS — H91.13 PRESBYCUSIS OF BOTH EARS: ICD-10-CM

## 2021-04-09 DIAGNOSIS — I34.0 NONRHEUMATIC MITRAL VALVE REGURGITATION: ICD-10-CM

## 2021-04-09 DIAGNOSIS — G47.33 OBSTRUCTIVE SLEEP APNEA SYNDROME: ICD-10-CM

## 2021-04-09 DIAGNOSIS — C91.10 CLL (CHRONIC LYMPHOCYTIC LEUKEMIA) (HCC): Primary | ICD-10-CM

## 2021-04-09 DIAGNOSIS — I10 ESSENTIAL HYPERTENSION: ICD-10-CM

## 2021-04-09 DIAGNOSIS — R73.03 PREDIABETES: ICD-10-CM

## 2021-04-09 DIAGNOSIS — E66.9 OBESITY (BMI 30.0-34.9): ICD-10-CM

## 2021-04-09 PROBLEM — D72.820 LYMPHOCYTOSIS: Status: RESOLVED | Noted: 2021-03-08 | Resolved: 2021-04-09

## 2021-04-09 PROBLEM — K51.40 PSEUDOPOLYP OF SIGMOID COLON WITHOUT COMPLICATION: Status: ACTIVE | Noted: 2021-04-09

## 2021-04-09 PROBLEM — K51.40 PSEUDOPOLYP OF SIGMOID COLON WITHOUT COMPLICATION: Status: RESOLVED | Noted: 2021-04-09 | Resolved: 2021-04-09

## 2021-04-09 LAB — REF LAB TEST RESULTS: NORMAL

## 2021-04-09 PROCEDURE — 99396 PREV VISIT EST AGE 40-64: CPT | Performed by: STUDENT IN AN ORGANIZED HEALTH CARE EDUCATION/TRAINING PROGRAM

## 2021-04-09 PROCEDURE — 99215 OFFICE O/P EST HI 40 MIN: CPT | Performed by: INTERNAL MEDICINE

## 2021-04-09 RX ORDER — CHLORTHALIDONE 25 MG/1
25 TABLET ORAL DAILY
Qty: 30 TABLET | Refills: 5 | Status: SHIPPED | OUTPATIENT
Start: 2021-04-09 | End: 2021-11-08

## 2021-04-09 RX ORDER — LISINOPRIL 20 MG/1
20 TABLET ORAL DAILY
Qty: 30 TABLET | Refills: 5 | Status: SHIPPED | OUTPATIENT
Start: 2021-04-09 | End: 2021-11-08

## 2021-04-09 RX ORDER — TERBINAFINE HYDROCHLORIDE 250 MG/1
250 TABLET ORAL DAILY
Qty: 30 TABLET | Refills: 2 | Status: SHIPPED | OUTPATIENT
Start: 2021-04-09 | End: 2022-02-11 | Stop reason: SDUPTHER

## 2021-04-09 NOTE — PROGRESS NOTES
"History of Present Illness  Sigifredo Walsh is a 60 y.o. male presenting for Annual Exam (fasting ).     Patient has a past medical history of prediabetes, diverticulosis, hypertension, obesity, osteoarthritis of the knee, KARRIE on CPAP and recent diagnosis of CLL and mitral valve regurgitation.    He was recently started on lisinopril 20 mg, added on chlorthalidone 25 mg.  Also he was started on terbinafine 1 month ago for toenail fungus.  He is tolerating the treatment well, feeling more energized.    Yesterday he had colonoscopy and they found a nonconcerning polyp in the sigmoid that was removed.  Later today he has follow-up appointment with Dr. Saunders at heme-onc clinic.    Patient has started exercising more, going for walks about 3 miles.  He has lost about 8 pounds over the last month.    The following portions of the patient's history were reviewed and updated as appropriate: allergies, current medications, past family history, past medical history, past social history, past surgical history and problem list.    Review of Systems   Allergic/Immunologic: Positive for environmental allergies.       Objective  /80 (BP Location: Left arm, Patient Position: Sitting, Cuff Size: Adult)   Pulse 70   Temp 97.7 °F (36.5 °C) (Temporal)   Ht 180.3 cm (70.98\")   Wt 107 kg (236 lb 3.2 oz)   SpO2 97%   BMI 32.96 kg/m²     Physical Exam  Vitals reviewed.   Constitutional:       General: He is not in acute distress.     Appearance: Normal appearance. He is obese.   HENT:      Head: Normocephalic and atraumatic.      Right Ear: Tympanic membrane, ear canal and external ear normal. There is no impacted cerumen.      Left Ear: Tympanic membrane, ear canal and external ear normal. There is no impacted cerumen.      Nose: Nose normal. No congestion.      Mouth/Throat:      Mouth: Mucous membranes are moist.      Pharynx: Oropharynx is clear.   Eyes:      Extraocular Movements: Extraocular movements intact.      " Conjunctiva/sclera: Conjunctivae normal.   Cardiovascular:      Rate and Rhythm: Normal rate and regular rhythm.      Heart sounds: Murmur heard.        Comments: Murmur 3-4/6 over apex radiating to left axilla.  Pulmonary:      Effort: Pulmonary effort is normal.      Breath sounds: Normal breath sounds.   Abdominal:      General: There is no distension.      Palpations: Abdomen is soft. There is no mass.      Tenderness: There is no abdominal tenderness.      Comments: No hepatosplenomegaly.   Musculoskeletal:      Cervical back: Neck supple.      Right lower leg: No edema.      Left lower leg: No edema.   Skin:     General: Skin is warm and dry.   Neurological:      Mental Status: He is alert and oriented to person, place, and time. Mental status is at baseline.   Psychiatric:         Behavior: Behavior normal.         Thought Content: Thought content normal.         Assessment/Plan   1. Well adult exam  Overall doing well, making lifestyle changes.  Up-to-date on vaccines except shingles vaccine, recommend he ask his pharmacy if they can offer it as we do not have it in our office.  He is due for his second Covid vaccine in 1 week.    2. Essential hypertension  BP Readings from Last 3 Encounters:   04/09/21 134/80   03/19/21 124/71   03/11/21 145/83   Blood pressure now well controlled.  Continue on current regimen.  - chlorthalidone (HYGROTON) 25 MG tablet; Take 1 tablet by mouth Daily.  Dispense: 30 tablet; Refill: 5  - lisinopril (PRINIVIL,ZESTRIL) 20 MG tablet; Take 1 tablet by mouth Daily.  Dispense: 30 tablet; Refill: 5  - Comprehensive Metabolic Panel; Future    3. Nonrheumatic mitral valve regurgitation  Evaluated by cardiology, TTE done.    4. CLL (chronic lymphocytic leukemia) (CMS/HCC)  Currently stable and completing work-up, has follow-up with Dr. Saunders today.    5. Fungal toenail infection  1 month into treatment now.  We will recheck his CMP for any liver side effects.  - terbinafine (LamISIL) 250  MG tablet; Take 1 tablet by mouth Daily.  Dispense: 30 tablet; Refill: 2    6. Obesity (BMI 30.0-34.9)  Patient's Body mass index is 32.96 kg/m². BMI is above normal parameters. Recommendations include: exercise counseling and nutrition counseling.      7. Primary osteoarthritis of knee, unspecified laterality  Stable.  Goes for walks.    8. Prediabetes  Hemoglobin A1C   Date Value Ref Range Status   03/08/2021 6.49 (H) 4.80 - 5.60 % Final   Patient is motivated to make lifestyle changes and hopefully avoid transition to diabetes.  We will recheck level on next visit.    9. History of colon polyps  Completed yesterday.  Patient brought report to be scanned in.  No recommendations yet as far as repeat colonoscopy, pending pathology.    10. Obstructive sleep apnea syndrome  Stable.  Continue on CPAP.    11. Presbycusis of both ears  Patient seen by ENT, he is getting hearing aids.      Return in about 4 months (around 8/9/2021) for Recheck BP, A1c and Lamisil treatment.    Future Appointments       Provider Department Center    4/9/2021 10:30 AM Levi Saunders MD [facility] HEMATOLOGY AND ONCOLOGY [facility]    4/15/2021 8:10 AM C19 VACCINE CLIN [facility] 2ND DOSE [facility] COVID19 VACCINE CLINIC [facility]    8/11/2021 8:00 AM Jasper Zhao MD [facility] INTERNAL MEDICINE [facility]          Jasper Zhao MD  Family Medicine  04/09/2021    Note: Speech recognition transcription software may have been used to create portions of this document.  An attempt at proofreading has been made but errors in transcription could still be present.

## 2021-04-09 NOTE — PROGRESS NOTES
CHIEF COMPLAINT: Follow-up CLL    Problem List:  Oncology/Hematology History Overview Note   1.  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  Tinnitus  3.  Fungal nail infection  4.  Osteoarthritis  5.  Sleep apnea on CPAP  6.  History of right inguinal herniorrhaphy  7.  Diverticulosis  8.  Hypertension    Hematology history timeline:  -3/8/2021 white count 39,560 with hemoglobin 14.1, platelets 138,000, 81% lymphocytes.  Peripheral smear shows smudge cells with atypical lymphocytes with enlarged nuclei with inconspicuous nucleoli.  An adequate volume for flow cytometry on initial blood draw.  C-reactive protein less than 0.3.  PSA normal 3.69 glucose 119 otherwise unremarkable CMP.      -3/19/2021 [facility] hematology oncology initial consultation: We will get peripheral blood flow cytometry flow cytometric hematolymphoid neoplasia assessment including ZAP70/CD38, cytogenetics, B-cell rearrangement, immunoglobulin variable heavy chain and p53 mutational analysis sequencing will be done, as well as snip MicroArray for CLL.  Absolute neutrophil count is normal at 4350 with absolute lymphocyte count 32,040.  Without frequent infections I will not check quantitative immunoglobulins.  CT scans are not necessary for diagnosis, surveillance, routine monitoring of treatment response, or progression.  CT scans may be warranted if symptoms of bulky disease or the assessment of risk for tumor lysis syndrome prior to initiation of venetoclax might be considered.  We will check lactate dehydrogenase, beta-2 microglobulin and uric acid.  Lymphocytosis itself is not an indication for treatment.  NCCN guideline indications for treatment include:  Fatigue severe  Night sweats  Unexplained weight loss  Fever without infection  Threatened endorgan function due to bulk  Spleen greater than 6 cm below costal margin  Lymph nodes greater than 10  cm  Progressive nonhemolytic anemia hemoglobin less than 10 (hemolytic anemia treated with steroids)  Progressive nonimmune mediated destruction thrombocytopenia platelets less than 100,000 (ITP)  Steroid refractory cytopenias    If these thresholds are reached, the molecular mutational analysis I am doing will guide the specific therapy I recommend.  Presently he does not have any of those clinical thresholds.  He does have swollen MCP and PIP joints with brothers who have psoriatic arthritis and I will check his sedimentation rate, C-reactive protein, rheumatoid factor, and extractable nuclear antigens as well and ultimately may need rheumatological referral.  Rheumatologic diseases can cause some lymphocytosis including monoclonal lymphocytic process but this would be higher lymphocyte count than I would expect from such processes.    -3/19/2021 data:  White count 36,900, hemoglobin 14.2, platelets 154,000, absolute neutrophil count 7900, absolute lymphocyte count 26,500.  CMP glucose 112, bicarb 32, otherwise unremarkable with creatinine 1.19 and normal liver enzymes.  Magnesium 2.1  Hemolytic panel: MARIS negative.  .  Uric acid normal 6.8.  ELLIE negative.  Rheumatoid factor negative.  proBNP normal 6.1  Sedimentation rate less than 1 with C-reactive protein 0.38.  Beta-2 microglobulin 3.5 upper limit 2.2  Flow cytometry: Clonal CD5 positive B-cell population 70% of analyzed cells consistent with CLL/SLL  ZAP70 positive/CD 38+: Double positive considered unfavorable and tends to correlate with immunoglobulin variable heavy chain unmutated status.  Immunoglobulin variable heavy chain somatic hyper mutation analysis did not yield interpretable results.  Cancer cytogenetic analysis showed 47, XY, +12 in all 20 metaphases cells.  CLL FISH panel positive for trisomy 12 which is detected in 20% of B-cell CLL which is associated with an intermediate-poor prognosis  B-cell immunoglobulin heavy and kappa light chain  gene rearrangements detected corroborating of B-cell neoplasm.  T p53 mutation analysis negative by DNA sequencing.    -4/9/2021 [facility] medical oncology follow-up visit: I reviewed the above molecular data with the patient.  The ZAP70 and CD38 and trisomy 12 positivity put him at an intermediate to high risk of progression I will watch him fairly closely.  I will repeat his CBC and CMP and physical exam in 2 months.  Thresholds for therapy as outlined above.  Ultimately what we choose to treat at that point depends on the COVID-19 pandemic status as much as his disease markers but he has not p53 mutated so we have a broader range of options.     CLL (chronic lymphocytic leukemia) (CMS/HCC)   3/19/2021 Initial Diagnosis    CLL (chronic lymphocytic leukemia) (CMS/HCC)     4/9/2021 Cancer Staged    Staging form: Chronic Lymphocytic Leukemia / Small Lymphocytic Lymphoma, AJCC 8th Edition  - Clinical: Modified Mina Stage 0 (Modified Mina risk: Low, Lymphocytosis: Present, Adenopathy: Absent, Organomegaly: Absent, Anemia: Absent, Thrombocytopenia: Absent) - Signed by Levi Saunders MD on 4/9/2021         HISTORY OF PRESENT ILLNESS:  The patient is a 60 y.o. male, here for follow up on management of CLL.  No new signs or symptoms    Past Medical History:   Diagnosis Date   • Cataract     Bilateral   • Diverticulosis    • Essential hypertension 03/2021    3/21 started Lisinopril   • Lymphocytosis 3/8/2021    3/2021. WBC 39.56. Abs lymphocyte 38. Plt 138. Hgb 14.1. Referral to hematology. CLL?   • Nonrheumatic mitral valve regurgitation 3/3/2021    Systolic murmur 4/6 noted 3/2021. TTE EF 56 - 60%. Mild to moderate mitral valve regurgitation, eccentric-anteriorly directed   • Osteoarthritis of knee    • Prediabetes 3/8/2021    3/2021 A1c 6.49   • Presbycusis of both ears 4/9/2021   • Pseudopolyp of sigmoid colon without complication (CMS/HCC) 4/9/2021   • Sensorineural hearing loss (SNHL) of both ears 3/17/2021    3/2021 ENT.  "Also tinnitus. Recommended hearing aids   • Sleep apnea     On CPAP consistently     Past Surgical History:   Procedure Laterality Date   • COLONOSCOPY      4/8/21   • FINGER GANGLION CYST EXCISION Right     3rd finger in the 90s   • INGUINAL HERNIA REPAIR Right        No Known Allergies    Family History and Social History reviewed and changed as necessary    REVIEW OF SYSTEM:   No bulky adenopathy.  No jaundice or icterus    PHYSICAL EXAM:  No cervical axillary or inguinal adenopathy.  No jaundice or icterus.    Vitals:    04/09/21 1052   BP: 123/81   Pulse: 62   Resp: 18   Temp: 98 °F (36.7 °C)   SpO2: 97%   Weight: 108 kg (238 lb)   Height: 180.3 cm (71\")     Vitals:    04/09/21 1052   PainSc: 0-No pain          ECOG score: 0           Vitals reviewed.    ECOG: (0) Fully Active - Able to Carry On All Pre-disease Performance Without Restriction    Lab Results   Component Value Date    HGB 14.2 03/19/2021    HCT 42.1 03/19/2021    MCV 92.2 03/19/2021     03/19/2021    WBC 36.90 (H) 03/19/2021    NEUTROABS 7.90 (H) 03/19/2021    LYMPHSABS 26.50 (H) 03/19/2021    MONOSABS 2.50 (H) 03/19/2021       Lab Results   Component Value Date    GLUCOSE 112 (H) 03/19/2021    BUN 21 03/19/2021    CREATININE 1.19 03/19/2021     03/19/2021    K 5.2 03/19/2021     03/19/2021    CO2 32.0 (H) 03/19/2021    CALCIUM 9.6 03/19/2021    PROTEINTOT 6.8 03/19/2021    ALBUMIN 4.20 03/19/2021    BILITOT 0.6 03/19/2021    ALKPHOS 54 03/19/2021    AST 15 03/19/2021    ALT 18 03/19/2021             ASSESSMENT & PLAN:  1.  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  Tinnitus  3.  Fungal nail infection  4.  Osteoarthritis  5.  Sleep apnea on CPAP  6.  History of right inguinal herniorrhaphy  7.  Diverticulosis  8.  Hypertension    Hematology history timeline:  -3/8/2021 white count 39,560 with hemoglobin 14.1, platelets 138,000, 81% " lymphocytes.  Peripheral smear shows smudge cells with atypical lymphocytes with enlarged nuclei with inconspicuous nucleoli.  An adequate volume for flow cytometry on initial blood draw.  C-reactive protein less than 0.3.  PSA normal 3.69 glucose 119 otherwise unremarkable CMP.      -3/19/2021 [facility] hematology oncology initial consultation: We will get peripheral blood flow cytometry flow cytometric hematolymphoid neoplasia assessment including ZAP70/CD38, cytogenetics, B-cell rearrangement, immunoglobulin variable heavy chain and p53 mutational analysis sequencing will be done, as well as snip MicroArray for CLL.  Absolute neutrophil count is normal at 4350 with absolute lymphocyte count 32,040.  Without frequent infections I will not check quantitative immunoglobulins.  CT scans are not necessary for diagnosis, surveillance, routine monitoring of treatment response, or progression.  CT scans may be warranted if symptoms of bulky disease or the assessment of risk for tumor lysis syndrome prior to initiation of venetoclax might be considered.  We will check lactate dehydrogenase, beta-2 microglobulin and uric acid.  Lymphocytosis itself is not an indication for treatment.  NCCN guideline indications for treatment include:  Fatigue severe  Night sweats  Unexplained weight loss  Fever without infection  Threatened endorgan function due to bulk  Spleen greater than 6 cm below costal margin  Lymph nodes greater than 10 cm  Progressive nonhemolytic anemia hemoglobin less than 10 (hemolytic anemia treated with steroids)  Progressive nonimmune mediated destruction thrombocytopenia platelets less than 100,000 (ITP)  Steroid refractory cytopenias    If these thresholds are reached, the molecular mutational analysis I am doing will guide the specific therapy I recommend.  Presently he does not have any of those clinical thresholds.  He does have swollen MCP and PIP joints with brothers who have psoriatic arthritis and I  will check his sedimentation rate, C-reactive protein, rheumatoid factor, and extractable nuclear antigens as well and ultimately may need rheumatological referral.  Rheumatologic diseases can cause some lymphocytosis including monoclonal lymphocytic process but this would be higher lymphocyte count than I would expect from such processes.    -3/19/2021 data:  White count 36,900, hemoglobin 14.2, platelets 154,000, absolute neutrophil count 7900, absolute lymphocyte count 26,500.  CMP glucose 112, bicarb 32, otherwise unremarkable with creatinine 1.19 and normal liver enzymes.  Magnesium 2.1  Hemolytic panel: MARIS negative.  .  Uric acid normal 6.8.  ELLIE negative.  Rheumatoid factor negative.  proBNP normal 6.1  Sedimentation rate less than 1 with C-reactive protein 0.38.  Beta-2 microglobulin 3.5 upper limit 2.2  Flow cytometry: Clonal CD5 positive B-cell population 70% of analyzed cells consistent with CLL/SLL  ZAP70 positive/CD 38+: Double positive considered unfavorable and tends to correlate with immunoglobulin variable heavy chain unmutated status.  Immunoglobulin variable heavy chain somatic hyper mutation analysis did not yield interpretable results.  Cancer cytogenetic analysis showed 47, XY, +12 in all 20 metaphases cells.  CLL FISH panel positive for trisomy 12 which is detected in 20% of B-cell CLL which is associated with an intermediate-poor prognosis  B-cell immunoglobulin heavy and kappa light chain gene rearrangements detected corroborating of B-cell neoplasm.  T p53 mutation analysis negative by DNA sequencing.    -4/9/2021 [facility] medical oncology follow-up visit: I reviewed the above molecular data with the patient.  The ZAP70 and CD38 and trisomy 12 positivity put him at an intermediate to high risk of progression I will watch him fairly closely.  I will repeat his CBC and CMP and physical exam in 2 months.  Thresholds for therapy as outlined above.  Ultimately what we choose to treat at  that point depends on the COVID-19 pandemic status as much as his disease markers but he has not p53 mutated so we have a broader range of options.  Total time of care today spent reviewing interim data which was quite extensive and interpreting these complex data to the patient in terms of prognosis and management and treatment options with the decision for watchful waiting based on this being stage 0 CLL presently took a total of 45 minutes of patient care time today inclusive of time spent before, during, and after visit collecting data, interpreting data, translating data the patient, and making further follow-up plans as outlined.  Levi Saunders MD    04/09/2021

## 2021-04-15 ENCOUNTER — IMMUNIZATION (OUTPATIENT)
Dept: VACCINE CLINIC | Facility: HOSPITAL | Age: 61
End: 2021-04-15

## 2021-04-15 PROCEDURE — 0002A: CPT | Performed by: INTERNAL MEDICINE

## 2021-04-15 PROCEDURE — 91300 HC SARSCOV02 VAC 30MCG/0.3ML IM: CPT | Performed by: INTERNAL MEDICINE

## 2021-04-19 ENCOUNTER — TELEPHONE (OUTPATIENT)
Dept: INTERNAL MEDICINE | Facility: CLINIC | Age: 61
End: 2021-04-19

## 2021-04-27 PROBLEM — D12.6 TUBULAR ADENOMA OF COLON: Status: ACTIVE | Noted: 2021-03-03

## 2021-05-05 ENCOUNTER — CLINICAL SUPPORT (OUTPATIENT)
Dept: INTERNAL MEDICINE | Facility: CLINIC | Age: 61
End: 2021-05-05

## 2021-05-05 ENCOUNTER — TELEPHONE (OUTPATIENT)
Dept: INTERNAL MEDICINE | Facility: CLINIC | Age: 61
End: 2021-05-05

## 2021-05-05 DIAGNOSIS — Z23 NEED FOR SHINGLES VACCINE: Primary | ICD-10-CM

## 2021-05-05 DIAGNOSIS — Z23 NEED FOR 23-POLYVALENT PNEUMOCOCCAL POLYSACCHARIDE VACCINE: ICD-10-CM

## 2021-05-05 PROCEDURE — 90732 PPSV23 VACC 2 YRS+ SUBQ/IM: CPT | Performed by: STUDENT IN AN ORGANIZED HEALTH CARE EDUCATION/TRAINING PROGRAM

## 2021-05-05 PROCEDURE — 90750 HZV VACC RECOMBINANT IM: CPT | Performed by: STUDENT IN AN ORGANIZED HEALTH CARE EDUCATION/TRAINING PROGRAM

## 2021-05-05 PROCEDURE — 90471 IMMUNIZATION ADMIN: CPT | Performed by: STUDENT IN AN ORGANIZED HEALTH CARE EDUCATION/TRAINING PROGRAM

## 2021-05-05 PROCEDURE — 90472 IMMUNIZATION ADMIN EACH ADD: CPT | Performed by: STUDENT IN AN ORGANIZED HEALTH CARE EDUCATION/TRAINING PROGRAM

## 2021-05-05 NOTE — PROGRESS NOTES
Patient requesting pneumococcus and shingles vaccines.  He had his last Covid dose on 4/15/2021.    1. Need for shingles vaccine  - Pneumococcal Polysaccharide Vaccine 23-Valent Greater Than or Equal To 1yo Subcutaneous / IM    2. Need for 23-polyvalent pneumococcal polysaccharide vaccine  - Shingrix Vaccine    Jasper Zhao MD

## 2021-05-05 NOTE — TELEPHONE ENCOUNTER
PT WAS LOOKING AT HIS MYCHART AND SEEN THAT HE HAS 2 COLONOSCOPIES BACK TO BACK.  THE CORRECT ONE IS THE 7TH.  PT WOULD LIKE THIS CORRECTED.

## 2021-06-04 ENCOUNTER — LAB (OUTPATIENT)
Dept: LAB | Facility: HOSPITAL | Age: 61
End: 2021-06-04

## 2021-06-04 ENCOUNTER — TELEPHONE (OUTPATIENT)
Dept: ONCOLOGY | Facility: CLINIC | Age: 61
End: 2021-06-04

## 2021-06-04 DIAGNOSIS — C91.10 CLL (CHRONIC LYMPHOCYTIC LEUKEMIA) (HCC): ICD-10-CM

## 2021-06-04 LAB
ALBUMIN SERPL-MCNC: 3.7 G/DL (ref 3.5–5.2)
ALBUMIN/GLOB SERPL: 1.5 G/DL
ALP SERPL-CCNC: 41 U/L (ref 39–117)
ALT SERPL W P-5'-P-CCNC: 16 U/L (ref 1–41)
ANION GAP SERPL CALCULATED.3IONS-SCNC: 9 MMOL/L (ref 5–15)
AST SERPL-CCNC: 12 U/L (ref 1–40)
BASOPHILS # BLD MANUAL: 0 10*3/MM3 (ref 0–0.2)
BASOPHILS NFR BLD AUTO: 0 % (ref 0–1.5)
BILIRUB SERPL-MCNC: 0.4 MG/DL (ref 0–1.2)
BUN SERPL-MCNC: 15 MG/DL (ref 8–23)
BUN/CREAT SERPL: 13.2 (ref 7–25)
CALCIUM SPEC-SCNC: 9 MG/DL (ref 8.6–10.5)
CHLORIDE SERPL-SCNC: 107 MMOL/L (ref 98–107)
CO2 SERPL-SCNC: 30 MMOL/L (ref 22–29)
CREAT SERPL-MCNC: 1.14 MG/DL (ref 0.76–1.27)
DEPRECATED RDW RBC AUTO: 48 FL (ref 37–54)
EOSINOPHIL # BLD MANUAL: 0 10*3/MM3 (ref 0–0.4)
EOSINOPHIL NFR BLD MANUAL: 0 % (ref 0.3–6.2)
ERYTHROCYTE [DISTWIDTH] IN BLOOD BY AUTOMATED COUNT: 13.4 % (ref 12.3–15.4)
GFR SERPL CREATININE-BSD FRML MDRD: 65 ML/MIN/1.73
GLOBULIN UR ELPH-MCNC: 2.4 GM/DL
GLUCOSE SERPL-MCNC: 120 MG/DL (ref 65–99)
HCT VFR BLD AUTO: 38.6 % (ref 37.5–51)
HGB BLD-MCNC: 12.8 G/DL (ref 13–17.7)
LYMPHOCYTES # BLD MANUAL: 36.83 10*3/MM3 (ref 0.7–3.1)
LYMPHOCYTES NFR BLD MANUAL: 5 % (ref 5–12)
LYMPHOCYTES NFR BLD MANUAL: 82 % (ref 19.6–45.3)
MCH RBC QN AUTO: 32 PG (ref 26.6–33)
MCHC RBC AUTO-ENTMCNC: 33.2 G/DL (ref 31.5–35.7)
MCV RBC AUTO: 96.5 FL (ref 79–97)
MONOCYTES # BLD AUTO: 2.19 10*3/MM3 (ref 0.1–0.9)
NEUTROPHILS # BLD AUTO: 4.82 10*3/MM3 (ref 1.7–7)
NEUTROPHILS NFR BLD MANUAL: 11 % (ref 42.7–76)
PLAT MORPH BLD: NORMAL
PLATELET # BLD AUTO: 147 10*3/MM3 (ref 140–450)
PMV BLD AUTO: 10.7 FL (ref 6–12)
POTASSIUM SERPL-SCNC: 4.5 MMOL/L (ref 3.5–5.2)
PROT SERPL-MCNC: 6.1 G/DL (ref 6–8.5)
RBC # BLD AUTO: 4 10*6/MM3 (ref 4.14–5.8)
RBC MORPH BLD: NORMAL
SMUDGE CELLS BLD QL SMEAR: ABNORMAL
SODIUM SERPL-SCNC: 146 MMOL/L (ref 136–145)
VARIANT LYMPHS NFR BLD MANUAL: 2 % (ref 0–5)
WBC # BLD AUTO: 43.85 10*3/MM3 (ref 3.4–10.8)

## 2021-06-04 PROCEDURE — 36415 COLL VENOUS BLD VENIPUNCTURE: CPT

## 2021-06-04 PROCEDURE — 85025 COMPLETE CBC W/AUTO DIFF WBC: CPT

## 2021-06-04 PROCEDURE — 85007 BL SMEAR W/DIFF WBC COUNT: CPT

## 2021-06-04 PROCEDURE — 80053 COMPREHEN METABOLIC PANEL: CPT

## 2021-06-04 NOTE — TELEPHONE ENCOUNTER
RACHEL FROM  LAB CALLED TO REPORT CRITICAL LABS ON PATIENT.   MY ATTEMPT TO WTSFR FAILED.   PLEASE CALL AND ADVISE  THANK YOU

## 2021-06-04 NOTE — TELEPHONE ENCOUNTER
Critical Test Results      MD: Dr. Saunders    Date: 6/4/21     Critical test result: WBC 43.85    Time results received:1201          Name of Physician notified: Dr. Saunders    Time Physician Notified: 1214    []  Orders received    []  Protocol/Standing orders followed    [x]  No new orders

## 2021-06-10 ENCOUNTER — OFFICE VISIT (OUTPATIENT)
Dept: ONCOLOGY | Facility: CLINIC | Age: 61
End: 2021-06-10

## 2021-06-10 VITALS
TEMPERATURE: 97.3 F | HEIGHT: 71 IN | BODY MASS INDEX: 31.78 KG/M2 | DIASTOLIC BLOOD PRESSURE: 73 MMHG | SYSTOLIC BLOOD PRESSURE: 119 MMHG | WEIGHT: 227 LBS | OXYGEN SATURATION: 96 % | RESPIRATION RATE: 18 BRPM | HEART RATE: 56 BPM

## 2021-06-10 DIAGNOSIS — C91.10 CLL (CHRONIC LYMPHOCYTIC LEUKEMIA) (HCC): Primary | Chronic | ICD-10-CM

## 2021-06-10 PROCEDURE — 99213 OFFICE O/P EST LOW 20 MIN: CPT | Performed by: INTERNAL MEDICINE

## 2021-06-10 NOTE — PROGRESS NOTES
CHIEF COMPLAINT: Follow-up CLL    Problem List:  Oncology/Hematology History Overview Note   1.  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  Tinnitus  3.  Fungal nail infection  4.  Osteoarthritis  5.  Sleep apnea on CPAP  6.  History of right inguinal herniorrhaphy  7.  Diverticulosis  8.  Hypertension    Hematology history timeline:  -3/8/2021 white count 39,560 with hemoglobin 14.1, platelets 138,000, 81% lymphocytes.  Peripheral smear shows smudge cells with atypical lymphocytes with enlarged nuclei with inconspicuous nucleoli.  An adequate volume for flow cytometry on initial blood draw.  C-reactive protein less than 0.3.  PSA normal 3.69 glucose 119 otherwise unremarkable CMP.      -3/19/2021 [facility] hematology oncology initial consultation: We will get peripheral blood flow cytometry flow cytometric hematolymphoid neoplasia assessment including ZAP70/CD38, cytogenetics, B-cell rearrangement, immunoglobulin variable heavy chain and p53 mutational analysis sequencing will be done, as well as snip MicroArray for CLL.  Absolute neutrophil count is normal at 4350 with absolute lymphocyte count 32,040.  Without frequent infections I will not check quantitative immunoglobulins.  CT scans are not necessary for diagnosis, surveillance, routine monitoring of treatment response, or progression.  CT scans may be warranted if symptoms of bulky disease or the assessment of risk for tumor lysis syndrome prior to initiation of venetoclax might be considered.  We will check lactate dehydrogenase, beta-2 microglobulin and uric acid.  Lymphocytosis itself is not an indication for treatment.  NCCN guideline indications for treatment include:  Fatigue severe  Night sweats  Unexplained weight loss  Fever without infection  Threatened endorgan function due to bulk  Spleen greater than 6 cm below costal margin  Lymph nodes greater than 10  cm  Progressive nonhemolytic anemia hemoglobin less than 10 (hemolytic anemia treated with steroids)  Progressive nonimmune mediated destruction thrombocytopenia platelets less than 100,000 (ITP)  Steroid refractory cytopenias    If these thresholds are reached, the molecular mutational analysis I am doing will guide the specific therapy I recommend.  Presently he does not have any of those clinical thresholds.  He does have swollen MCP and PIP joints with brothers who have psoriatic arthritis and I will check his sedimentation rate, C-reactive protein, rheumatoid factor, and extractable nuclear antigens as well and ultimately may need rheumatological referral.  Rheumatologic diseases can cause some lymphocytosis including monoclonal lymphocytic process but this would be higher lymphocyte count than I would expect from such processes.    -3/19/2021 data:  White count 36,900, hemoglobin 14.2, platelets 154,000, absolute neutrophil count 7900, absolute lymphocyte count 26,500.  CMP glucose 112, bicarb 32, otherwise unremarkable with creatinine 1.19 and normal liver enzymes.  Magnesium 2.1  Hemolytic panel: MARIS negative.  .  Uric acid normal 6.8.  ELLIE negative.  Rheumatoid factor negative.  proBNP normal 6.1  Sedimentation rate less than 1 with C-reactive protein 0.38.  Beta-2 microglobulin 3.5 upper limit 2.2  Flow cytometry: Clonal CD5 positive B-cell population 70% of analyzed cells consistent with CLL/SLL  ZAP70 positive/CD 38+: Double positive considered unfavorable and tends to correlate with immunoglobulin variable heavy chain unmutated status.  Immunoglobulin variable heavy chain somatic hyper mutation analysis did not yield interpretable results.  Cancer cytogenetic analysis showed 47, XY, +12 in all 20 metaphases cells.  CLL FISH panel positive for trisomy 12 which is detected in 20% of B-cell CLL which is associated with an intermediate-poor prognosis  B-cell immunoglobulin heavy and kappa light chain  gene rearrangements detected corroborating of B-cell neoplasm.  T p53 mutation analysis negative by DNA sequencing.    -4/9/2021 [facility] medical oncology follow-up visit: I reviewed the above molecular data with the patient.  The ZAP70 and CD38 and trisomy 12 positivity put him at an intermediate to high risk of progression I will watch him fairly closely.  I will repeat his CBC and CMP and physical exam in 2 months.  Thresholds for therapy as outlined above.  Ultimately what we choose to treat at that point depends on the COVID-19 pandemic status as much as his disease markers but he has not p53 mutated so we have a broader range of options.    -6/10/2021 [facility] hematology follow-up visit: 6/4/2021 data reveals white count 43,850 with hemoglobin 12.8 platelets 147,000.  Absolute neutrophil count normal 4820.  No bulky adenopathy.  We will repeat labs again in 2 months.  Indications/thresholds for therapy for CLL as indicated above have not been reached.     CLL (chronic lymphocytic leukemia) (CMS/HCC)   3/19/2021 Initial Diagnosis    CLL (chronic lymphocytic leukemia) (CMS/HCC)     4/9/2021 Cancer Staged    Staging form: Chronic Lymphocytic Leukemia / Small Lymphocytic Lymphoma, AJCC 8th Edition  - Clinical: Modified Mina Stage 0 (Modified Mina risk: Low, Lymphocytosis: Present, Adenopathy: Absent, Organomegaly: Absent, Anemia: Absent, Thrombocytopenia: Absent) - Signed by Levi Saunders MD on 4/9/2021         HISTORY OF PRESENT ILLNESS:  The patient is a 60 y.o. male, here for follow up on management of CLL.  No frequent infections.  No bulky adenopathy.  No unexplained B symptoms  Past Medical History:   Diagnosis Date   • Cataract     Bilateral   • Diverticulosis    • Essential hypertension 03/2021    3/21 started Lisinopril   • Lymphocytosis 3/8/2021    3/2021. WBC 39.56. Abs lymphocyte 38. Plt 138. Hgb 14.1. Referral to hematology. CLL?   • Nonrheumatic mitral valve regurgitation 3/3/2021    Systolic murmur 4/6  "noted 3/2021. TTE EF 56 - 60%. Mild to moderate mitral valve regurgitation, eccentric-anteriorly directed   • Osteoarthritis of knee    • Prediabetes 3/8/2021    3/2021 A1c 6.49   • Presbycusis of both ears 4/9/2021   • Pseudopolyp of sigmoid colon without complication (CMS/HCC) 4/9/2021   • Sensorineural hearing loss (SNHL) of both ears 3/17/2021    3/2021 ENT. Also tinnitus. Recommended hearing aids   • Sleep apnea     On CPAP consistently   • Tubular adenoma of colon 3/3/2021    4/2021: Tubular adenoma of sigmoid. No high grade.     Past Surgical History:   Procedure Laterality Date   • COLONOSCOPY      4/8/21   • FINGER GANGLION CYST EXCISION Right     3rd finger in the 90s   • INGUINAL HERNIA REPAIR Right        No Known Allergies    Family History and Social History reviewed and changed as necessary    REVIEW OF SYSTEM:   No B symptoms.  No rashes.  No adenopathy    PHYSICAL EXAM:  No bulky cervical axillary or inguinal adenopathy    Vitals:    06/10/21 1129   BP: 119/73   Pulse: 56   Resp: 18   Temp: 97.3 °F (36.3 °C)   SpO2: 96%   Weight: 103 kg (227 lb)   Height: 180.3 cm (71\")     Vitals:    06/10/21 1129   PainSc: 0-No pain          ECOG score: 0           Vitals reviewed.    ECOG: (0) Fully Active - Able to Carry On All Pre-disease Performance Without Restriction    Lab Results   Component Value Date    HGB 12.8 (L) 06/04/2021    HCT 38.6 06/04/2021    MCV 96.5 06/04/2021     06/04/2021    WBC 43.85 (C) 06/04/2021    NEUTROABS 4.82 06/04/2021    LYMPHSABS 26.50 (H) 03/19/2021    MONOSABS 2.50 (H) 03/19/2021    EOSABS 0.00 06/04/2021    BASOSABS 0.00 06/04/2021       Lab Results   Component Value Date    GLUCOSE 120 (H) 06/04/2021    BUN 15 06/04/2021    CREATININE 1.14 06/04/2021     (H) 06/04/2021    K 4.5 06/04/2021     06/04/2021    CO2 30.0 (H) 06/04/2021    CALCIUM 9.0 06/04/2021    PROTEINTOT 6.1 06/04/2021    ALBUMIN 3.70 06/04/2021    BILITOT 0.4 06/04/2021    ALKPHOS 41 " 06/04/2021    AST 12 06/04/2021    ALT 16 06/04/2021             ASSESSMENT & PLAN:  1.  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  Tinnitus  3.  Fungal nail infection  4.  Osteoarthritis  5.  Sleep apnea on CPAP  6.  History of right inguinal herniorrhaphy  7.  Diverticulosis  8.  Hypertension    Hematology history timeline:  -3/8/2021 white count 39,560 with hemoglobin 14.1, platelets 138,000, 81% lymphocytes.  Peripheral smear shows smudge cells with atypical lymphocytes with enlarged nuclei with inconspicuous nucleoli.  An adequate volume for flow cytometry on initial blood draw.  C-reactive protein less than 0.3.  PSA normal 3.69 glucose 119 otherwise unremarkable CMP.      -3/19/2021 [facility] hematology oncology initial consultation: We will get peripheral blood flow cytometry flow cytometric hematolymphoid neoplasia assessment including ZAP70/CD38, cytogenetics, B-cell rearrangement, immunoglobulin variable heavy chain and p53 mutational analysis sequencing will be done, as well as snip MicroArray for CLL.  Absolute neutrophil count is normal at 4350 with absolute lymphocyte count 32,040.  Without frequent infections I will not check quantitative immunoglobulins.  CT scans are not necessary for diagnosis, surveillance, routine monitoring of treatment response, or progression.  CT scans may be warranted if symptoms of bulky disease or the assessment of risk for tumor lysis syndrome prior to initiation of venetoclax might be considered.  We will check lactate dehydrogenase, beta-2 microglobulin and uric acid.  Lymphocytosis itself is not an indication for treatment.  NCCN guideline indications for treatment include:  Fatigue severe  Night sweats  Unexplained weight loss  Fever without infection  Threatened endorgan function due to bulk  Spleen greater than 6 cm below costal margin  Lymph nodes greater than 10  gene rearrangements detected corroborating of B-cell neoplasm.  T p53 mutation analysis negative by DNA sequencing.    -4/9/2021 [facility] medical oncology follow-up visit: I reviewed the above molecular data with the patient.  The ZAP70 and CD38 and trisomy 12 positivity put him at an intermediate to high risk of progression I will watch him fairly closely.  I will repeat his CBC and CMP and physical exam in 2 months.  Thresholds for therapy as outlined above.  Ultimately what we choose to treat at that point depends on the COVID-19 pandemic status as much as his disease markers but he has not p53 mutated so we have a broader range of options.    -6/10/2021 [facility] hematology follow-up visit: 6/4/2021 data reveals white count 43,850 with hemoglobin 12.8 platelets 147,000.  Absolute neutrophil count normal 4820.  No bulky adenopathy.  We will repeat labs again in 2 months.  Indications/thresholds for therapy for CLL as indicated above have not been reached.      Total time of care today inclusive of time spent prior to visit reviewing interval data and during visit interpreting this to the patient and setting up follow-up took 15 minutes of patient care time.  Levi Saunders MD    06/10/2021

## 2021-08-11 ENCOUNTER — OFFICE VISIT (OUTPATIENT)
Dept: INTERNAL MEDICINE | Facility: CLINIC | Age: 61
End: 2021-08-11

## 2021-08-11 ENCOUNTER — LAB (OUTPATIENT)
Dept: LAB | Facility: HOSPITAL | Age: 61
End: 2021-08-11

## 2021-08-11 VITALS
SYSTOLIC BLOOD PRESSURE: 132 MMHG | DIASTOLIC BLOOD PRESSURE: 80 MMHG | HEIGHT: 71 IN | WEIGHT: 226.6 LBS | HEART RATE: 60 BPM | BODY MASS INDEX: 31.72 KG/M2 | TEMPERATURE: 98 F

## 2021-08-11 DIAGNOSIS — I10 ESSENTIAL HYPERTENSION: Primary | Chronic | ICD-10-CM

## 2021-08-11 DIAGNOSIS — Z23 NEED FOR SHINGLES VACCINE: ICD-10-CM

## 2021-08-11 DIAGNOSIS — I10 ESSENTIAL HYPERTENSION: Chronic | ICD-10-CM

## 2021-08-11 DIAGNOSIS — E66.9 OBESITY (BMI 30.0-34.9): ICD-10-CM

## 2021-08-11 DIAGNOSIS — B35.1 FUNGAL TOENAIL INFECTION: ICD-10-CM

## 2021-08-11 DIAGNOSIS — C91.10 CLL (CHRONIC LYMPHOCYTIC LEUKEMIA) (HCC): Chronic | ICD-10-CM

## 2021-08-11 DIAGNOSIS — R73.03 PREDIABETES: ICD-10-CM

## 2021-08-11 DIAGNOSIS — I34.0 NONRHEUMATIC MITRAL VALVE REGURGITATION: Chronic | ICD-10-CM

## 2021-08-11 LAB
ALBUMIN SERPL-MCNC: 4.2 G/DL (ref 3.5–5.2)
ALBUMIN/GLOB SERPL: 1.8 G/DL
ALP SERPL-CCNC: 44 U/L (ref 39–117)
ALT SERPL W P-5'-P-CCNC: 15 U/L (ref 1–41)
ANION GAP SERPL CALCULATED.3IONS-SCNC: 7.9 MMOL/L (ref 5–15)
AST SERPL-CCNC: 21 U/L (ref 1–40)
BILIRUB SERPL-MCNC: 0.3 MG/DL (ref 0–1.2)
BUN SERPL-MCNC: 18 MG/DL (ref 8–23)
BUN/CREAT SERPL: 16.8 (ref 7–25)
CALCIUM SPEC-SCNC: 9.3 MG/DL (ref 8.6–10.5)
CHLORIDE SERPL-SCNC: 102 MMOL/L (ref 98–107)
CO2 SERPL-SCNC: 29.1 MMOL/L (ref 22–29)
CREAT SERPL-MCNC: 1.07 MG/DL (ref 0.76–1.27)
GFR SERPL CREATININE-BSD FRML MDRD: 70 ML/MIN/1.73
GLOBULIN UR ELPH-MCNC: 2.4 GM/DL
GLUCOSE SERPL-MCNC: 82 MG/DL (ref 65–99)
HBA1C MFR BLD: 5.94 % (ref 4.8–5.6)
POTASSIUM SERPL-SCNC: 4.3 MMOL/L (ref 3.5–5.2)
PROT SERPL-MCNC: 6.6 G/DL (ref 6–8.5)
SODIUM SERPL-SCNC: 139 MMOL/L (ref 136–145)

## 2021-08-11 PROCEDURE — 90471 IMMUNIZATION ADMIN: CPT | Performed by: STUDENT IN AN ORGANIZED HEALTH CARE EDUCATION/TRAINING PROGRAM

## 2021-08-11 PROCEDURE — 80053 COMPREHEN METABOLIC PANEL: CPT

## 2021-08-11 PROCEDURE — 99214 OFFICE O/P EST MOD 30 MIN: CPT | Performed by: STUDENT IN AN ORGANIZED HEALTH CARE EDUCATION/TRAINING PROGRAM

## 2021-08-11 PROCEDURE — 90750 HZV VACC RECOMBINANT IM: CPT | Performed by: STUDENT IN AN ORGANIZED HEALTH CARE EDUCATION/TRAINING PROGRAM

## 2021-08-11 PROCEDURE — 83036 HEMOGLOBIN GLYCOSYLATED A1C: CPT

## 2021-08-11 NOTE — PROGRESS NOTES
"Chief Complaint  Sigifredo Walsh is a 60 y.o. male presenting for Hypertension (4 mo. f/u).     Patient has a past medical history of prediabetes, diverticulosis, hypertension, obesity, osteoarthritis of the knee, KARRIE on CPAP and recent diagnosis of CLL and mitral valve regurgitation.    History of Present Illness  Patient is here for follow-up of his hypertension.    He is overall doing well, [email or web address]    Patient exercises 1 day/week, walks about 3 miles.  Still working on weight loss.    Patient has follow-up with Dr. Saunders for his CLL stage 0, observed without treatment.    Patient has been treated for toenail fungus with terbinafine, liver enzymes have been normal.  He did take it for 3 months and then paused for 3 weeks and restarted it because he felt there was still some changes left.    Patient was evaluated by cardiology earlier this year for systolic murmur, shown to be nonrheumatic mitral valve regurgitation.  Patient is not reporting any chest pain or shortness of air.    The following portions of the patient's history were reviewed and updated as appropriate: allergies, current medications, past family history, past medical history, past social history, past surgical history and problem list.    Objective  /80 (BP Location: Left arm, Patient Position: Sitting, Cuff Size: Adult)   Pulse 60   Temp 98 °F (36.7 °C) (Temporal)   Ht 180.3 cm (70.98\")   Wt 103 kg (226 lb 9.6 oz)   BMI 31.62 kg/m²     Physical Exam  Vitals reviewed.   Constitutional:       Appearance: Normal appearance.   HENT:      Head: Normocephalic and atraumatic.      Right Ear: Tympanic membrane, ear canal and external ear normal. There is no impacted cerumen.      Left Ear: Tympanic membrane, ear canal and external ear normal. There is no impacted cerumen.      Nose: No congestion.   Eyes:      Extraocular Movements: Extraocular movements intact.      Conjunctiva/sclera: Conjunctivae " normal.   Cardiovascular:      Rate and Rhythm: Normal rate and regular rhythm.      Heart sounds: Murmur heard.        Comments: Systolic murmur grade 3-4/6 with maximum over apex.  Pulmonary:      Effort: Pulmonary effort is normal.      Breath sounds: Normal breath sounds.   Abdominal:      General: Bowel sounds are normal. There is no distension.      Palpations: Abdomen is soft. There is no mass.      Tenderness: There is no abdominal tenderness.   Musculoskeletal:      Cervical back: Neck supple.      Right lower leg: No edema.      Left lower leg: No edema.   Feet:      Comments: Clearing of the proximal toenails, but still residue on the distal half consistent with fungus  Skin:     General: Skin is warm and dry.   Neurological:      Mental Status: He is alert and oriented to person, place, and time. Mental status is at baseline.   Psychiatric:         Behavior: Behavior normal.         Thought Content: Thought content normal.         Assessment/Plan   1. Essential hypertension  BP Readings from Last 3 Encounters:   08/11/21 132/80   06/10/21 119/73   04/09/21 123/81   Blood pressure currently at goal.  Continue on lisinopril 20 mg and chlorthalidone 25 mg  - Comprehensive Metabolic Panel; Future    2. Nonrheumatic mitral valve regurgitation  Stable, no chest pain or shortness of air.  Was evaluated by cardiology.    3. CLL (chronic lymphocytic leukemia) (CMS/[facility])  Observe without treatment, follows with Dr. Saunders.  Filled out claims form for critical illness for patient, copy to be scanned into chart.    4. Prediabetes  Hemoglobin A1C   Date Value Ref Range Status   03/08/2021 6.49 (H) 4.80 - 5.60 % Final   Will recheck level today.  - Hemoglobin A1c; Future    5. Obesity (BMI 30.0-34.9)  Patient's Body mass index is 31.62 kg/m². indicating that he is obese (BMI >30). Obesity-related health conditions include the following: obstructive sleep apnea and hypertension. Obesity is unchanged. BMI is is above  average; BMI management plan is completed. We discussed increasing exercise.. Counseled on recommendations for moderate intensity exercise 30 minutes a day 5 days/week.    6. Need for shingles vaccine  Dose 2/2 administered today.  Counseled on treatment options for one-sided painful rash if he would develop at any point, ideally he would present as soon as possible within 2 days.  - Shingrix Vaccine     7. Fungal toenail infection  Seems to be clearing up.  Continue on terbinafine for up to a total of 6 months.  We will repeat CMP, but liver enzymes have been normal.    Total time spent on chart review, charting and face-to-face with patient 38 minutes.    Return in about 6 months (around 2/11/2022) for Recheck.    Future Appointments       Provider Department Center    8/11/2021 9:10 AM [facility]    9/10/2021 11:15 AM Levi Saunders MD [facility] HEMATOLOGY AND ONCOLOGY [facility]    2/11/2022 9:15 AM Jasper Zhao MD [facility] INTERNAL MEDICINE [facility]          Jasper Zhao MD  Family Medicine  08/11/2021    Note: Speech recognition transcription software may have been used to create portions of this document.  An attempt at proofreading has been made but errors in transcription could still be present.

## 2021-09-10 ENCOUNTER — OFFICE VISIT (OUTPATIENT)
Dept: ONCOLOGY | Facility: CLINIC | Age: 61
End: 2021-09-10

## 2021-09-10 ENCOUNTER — LAB (OUTPATIENT)
Dept: LAB | Facility: HOSPITAL | Age: 61
End: 2021-09-10

## 2021-09-10 VITALS
HEART RATE: 66 BPM | TEMPERATURE: 96.6 F | HEIGHT: 71 IN | WEIGHT: 225 LBS | BODY MASS INDEX: 31.5 KG/M2 | DIASTOLIC BLOOD PRESSURE: 70 MMHG | SYSTOLIC BLOOD PRESSURE: 118 MMHG | OXYGEN SATURATION: 98 % | RESPIRATION RATE: 18 BRPM

## 2021-09-10 DIAGNOSIS — C91.10 CLL (CHRONIC LYMPHOCYTIC LEUKEMIA) (HCC): ICD-10-CM

## 2021-09-10 DIAGNOSIS — C91.10 CLL (CHRONIC LYMPHOCYTIC LEUKEMIA) (HCC): Primary | Chronic | ICD-10-CM

## 2021-09-10 LAB
ALBUMIN SERPL-MCNC: 4.4 G/DL (ref 3.5–5.2)
ALBUMIN/GLOB SERPL: 1.7 G/DL
ALP SERPL-CCNC: 47 U/L (ref 39–117)
ALT SERPL W P-5'-P-CCNC: 15 U/L (ref 1–41)
ANION GAP SERPL CALCULATED.3IONS-SCNC: 12 MMOL/L (ref 5–15)
AST SERPL-CCNC: 14 U/L (ref 1–40)
BILIRUB SERPL-MCNC: 0.4 MG/DL (ref 0–1.2)
BUN SERPL-MCNC: 23 MG/DL (ref 8–23)
BUN/CREAT SERPL: 20.5 (ref 7–25)
CALCIUM SPEC-SCNC: 9.7 MG/DL (ref 8.6–10.5)
CHLORIDE SERPL-SCNC: 100 MMOL/L (ref 98–107)
CO2 SERPL-SCNC: 26 MMOL/L (ref 22–29)
CREAT SERPL-MCNC: 1.12 MG/DL (ref 0.76–1.27)
ERYTHROCYTE [DISTWIDTH] IN BLOOD BY AUTOMATED COUNT: 13.1 % (ref 12.3–15.4)
GFR SERPL CREATININE-BSD FRML MDRD: 66 ML/MIN/1.73
GLOBULIN UR ELPH-MCNC: 2.6 GM/DL
GLUCOSE SERPL-MCNC: 117 MG/DL (ref 65–99)
HCT VFR BLD AUTO: 41.8 % (ref 37.5–51)
HGB BLD-MCNC: 13.9 G/DL (ref 13–17.7)
LYMPHOCYTES # BLD AUTO: 40.1 10*3/MM3 (ref 0.7–3.1)
LYMPHOCYTES NFR BLD AUTO: 76 % (ref 19.6–45.3)
MCH RBC QN AUTO: 32 PG (ref 26.6–33)
MCHC RBC AUTO-ENTMCNC: 33.3 G/DL (ref 31.5–35.7)
MCV RBC AUTO: 96.2 FL (ref 79–97)
MONOCYTES # BLD AUTO: 3.6 10*3/MM3 (ref 0.1–0.9)
MONOCYTES NFR BLD AUTO: 6.9 % (ref 5–12)
NEUTROPHILS NFR BLD AUTO: 17.1 % (ref 42.7–76)
NEUTROPHILS NFR BLD AUTO: 9 10*3/MM3 (ref 1.7–7)
PLATELET # BLD AUTO: 196 10*3/MM3 (ref 140–450)
PMV BLD AUTO: 6.9 FL (ref 6–12)
POTASSIUM SERPL-SCNC: 4.4 MMOL/L (ref 3.5–5.2)
PROT SERPL-MCNC: 7 G/DL (ref 6–8.5)
RBC # BLD AUTO: 4.35 10*6/MM3 (ref 4.14–5.8)
SODIUM SERPL-SCNC: 138 MMOL/L (ref 136–145)
WBC # BLD AUTO: 52.7 10*3/MM3 (ref 3.4–10.8)

## 2021-09-10 PROCEDURE — 99214 OFFICE O/P EST MOD 30 MIN: CPT | Performed by: INTERNAL MEDICINE

## 2021-09-10 PROCEDURE — 80053 COMPREHEN METABOLIC PANEL: CPT

## 2021-09-10 PROCEDURE — 36415 COLL VENOUS BLD VENIPUNCTURE: CPT

## 2021-09-10 PROCEDURE — 85025 COMPLETE CBC W/AUTO DIFF WBC: CPT

## 2021-09-10 RX ORDER — MULTIPLE VITAMINS W/ MINERALS TAB 9MG-400MCG
1 TAB ORAL DAILY
COMMUNITY

## 2021-09-10 NOTE — PROGRESS NOTES
CHIEF COMPLAINT: Follow-up asymptomatic CLL    Problem List:  Oncology/Hematology History Overview Note   1.  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  Tinnitus  3.  Fungal nail infection  4.  Osteoarthritis  5.  Sleep apnea on CPAP  6.  History of right inguinal herniorrhaphy  7.  Diverticulosis  8.  Hypertension    Hematology history timeline:  -3/8/2021 white count 39,560 with hemoglobin 14.1, platelets 138,000, 81% lymphocytes.  Peripheral smear shows smudge cells with atypical lymphocytes with enlarged nuclei with inconspicuous nucleoli.  An adequate volume for flow cytometry on initial blood draw.  C-reactive protein less than 0.3.  PSA normal 3.69 glucose 119 otherwise unremarkable CMP.      -3/19/2021 [facility] hematology oncology initial consultation: We will get peripheral blood flow cytometry flow cytometric hematolymphoid neoplasia assessment including ZAP70/CD38, cytogenetics, B-cell rearrangement, immunoglobulin variable heavy chain and p53 mutational analysis sequencing will be done, as well as snip MicroArray for CLL.  Absolute neutrophil count is normal at 4350 with absolute lymphocyte count 32,040.  Without frequent infections I will not check quantitative immunoglobulins.  CT scans are not necessary for diagnosis, surveillance, routine monitoring of treatment response, or progression.  CT scans may be warranted if symptoms of bulky disease or the assessment of risk for tumor lysis syndrome prior to initiation of venetoclax might be considered.  We will check lactate dehydrogenase, beta-2 microglobulin and uric acid.  Lymphocytosis itself is not an indication for treatment.  NCCN guideline indications for treatment include:  Fatigue severe  Night sweats  Unexplained weight loss  Fever without infection  Threatened endorgan function due to bulk  Spleen greater than 6 cm below costal margin  Lymph nodes greater  than 10 cm  Progressive nonhemolytic anemia hemoglobin less than 10 (hemolytic anemia treated with steroids)  Progressive nonimmune mediated destruction thrombocytopenia platelets less than 100,000 (ITP)  Steroid refractory cytopenias    If these thresholds are reached, the molecular mutational analysis I am doing will guide the specific therapy I recommend.  Presently he does not have any of those clinical thresholds.  He does have swollen MCP and PIP joints with brothers who have psoriatic arthritis and I will check his sedimentation rate, C-reactive protein, rheumatoid factor, and extractable nuclear antigens as well and ultimately may need rheumatological referral.  Rheumatologic diseases can cause some lymphocytosis including monoclonal lymphocytic process but this would be higher lymphocyte count than I would expect from such processes.    -3/19/2021 data:  White count 36,900, hemoglobin 14.2, platelets 154,000, absolute neutrophil count 7900, absolute lymphocyte count 26,500.  CMP glucose 112, bicarb 32, otherwise unremarkable with creatinine 1.19 and normal liver enzymes.  Magnesium 2.1  Hemolytic panel: MARIS negative.  .  Uric acid normal 6.8.  ELLIE negative.  Rheumatoid factor negative.  proBNP normal 6.1  Sedimentation rate less than 1 with C-reactive protein 0.38.  Beta-2 microglobulin 3.5 upper limit 2.2  Flow cytometry: Clonal CD5 positive B-cell population 70% of analyzed cells consistent with CLL/SLL  ZAP70 positive/CD 38+: Double positive considered unfavorable and tends to correlate with immunoglobulin variable heavy chain unmutated status.  Immunoglobulin variable heavy chain somatic hyper mutation analysis did not yield interpretable results.  Cancer cytogenetic analysis showed 47, XY, +12 in all 20 metaphases cells.  CLL FISH panel positive for trisomy 12 which is detected in 20% of B-cell CLL which is associated with an intermediate-poor prognosis  B-cell immunoglobulin heavy and kappa  light chain gene rearrangements detected corroborating of B-cell neoplasm.  T p53 mutation analysis negative by DNA sequencing.    -4/9/2021 [facility] medical oncology follow-up visit: I reviewed the above molecular data with the patient.  The ZAP70 and CD38 and trisomy 12 positivity put him at an intermediate to high risk of progression I will watch him fairly closely.  I will repeat his CBC and CMP and physical exam in 2 months.  Thresholds for therapy as outlined above.  Ultimately what we choose to treat at that point depends on the COVID-19 pandemic status as much as his disease markers but he has not p53 mutated so we have a broader range of options.    -6/10/2021 [facility] hematology follow-up visit: 6/4/2021 data reveals white count 43,850 with hemoglobin 12.8 platelets 147,000.  Absolute neutrophil count normal 4820.  No bulky adenopathy.  We will repeat labs again in 2 months.  Indications/thresholds for therapy for CLL as indicated above have not been reached.    -9/10/2021 [facility] oncology follow-up visit: White count today is 52,700.  Hemoglobin is 13.9 with platelets 196,000 and no bulky adenopathy, fevers, chills, unexplained weight loss, effusions, bed drenching night sweats.  Hence has not reached NCCN guideline indications for treatment as outlined above.  We will see him back in 3 months with monthly CBC in the interim.  He has not had doubling of his lymphocyte count in less than 6 months either.  Recommended Covid vaccination with booster.     CLL (chronic lymphocytic leukemia) (CMS/[facility])   3/19/2021 Initial Diagnosis    CLL (chronic lymphocytic leukemia) (CMS/[facility])     4/9/2021 Cancer Staged    Staging form: Chronic Lymphocytic Leukemia / Small Lymphocytic Lymphoma, AJCC 8th Edition  - Clinical: Modified Mina Stage 0 (Modified Mina risk: Low, Lymphocytosis: Present, Adenopathy: Absent, Organomegaly: Absent, Anemia: Absent, Thrombocytopenia: Absent) - Signed by Levi Saunders MD on 4/9/2021    "      HISTORY OF PRESENT ILLNESS:  The patient is a 60 y.o. male, here for follow up on management of asymptomatic CLL.  No new complaints.  No frequent infections.    Past Medical History:   Diagnosis Date   • Cataract     Bilateral   • Diverticulosis    • Essential hypertension 03/2021    3/21 started Lisinopril   • Lymphocytosis 3/8/2021    3/2021. WBC 39.56. Abs lymphocyte 38. Plt 138. Hgb 14.1. Referral to hematology. CLL?   • Nonrheumatic mitral valve regurgitation 3/3/2021    Systolic murmur 4/6 noted 3/2021. TTE EF 56 - 60%. Mild to moderate mitral valve regurgitation, eccentric-anteriorly directed   • Osteoarthritis of knee    • Prediabetes 3/8/2021    3/2021 A1c 6.49   • Presbycusis of both ears 4/9/2021   • Pseudopolyp of sigmoid colon without complication (CMS/HCC) 4/9/2021   • Sensorineural hearing loss (SNHL) of both ears 3/17/2021    3/2021 ENT. Also tinnitus. Recommended hearing aids   • Sleep apnea     On CPAP consistently   • Tubular adenoma of colon 3/3/2021    4/2021: Tubular adenoma of sigmoid. No high grade.     Past Surgical History:   Procedure Laterality Date   • COLONOSCOPY      4/8/21   • FINGER GANGLION CYST EXCISION Right     3rd finger in the 90s   • INGUINAL HERNIA REPAIR Right        No Known Allergies    Family History and Social History reviewed and changed as necessary    REVIEW OF SYSTEM:   No frequent infections.  No other somatic complaints    PHYSICAL EXAM:  No palpable cervical axillary or inguinal adenopathy.  No hepatosplenomegaly.    Vitals:    09/10/21 1109   BP: 118/70   Pulse: 66   Resp: 18   Temp: 96.6 °F (35.9 °C)   SpO2: 98%   Weight: 102 kg (225 lb)   Height: 180.3 cm (71\")     Vitals:    09/10/21 1109   PainSc: 0-No pain          ECOG score: 0           Vitals reviewed.        Lab Results   Component Value Date    HGB 13.9 09/10/2021    HCT 41.8 09/10/2021    MCV 96.2 09/10/2021     09/10/2021    WBC 52.70 (H) 09/10/2021    NEUTROABS 9.00 (H) 09/10/2021    " LYMPHSABS 40.10 (H) 09/10/2021    MONOSABS 3.60 (H) 09/10/2021    EOSABS 0.00 06/04/2021    BASOSABS 0.00 06/04/2021       Lab Results   Component Value Date    GLUCOSE 82 08/11/2021    BUN 18 08/11/2021    CREATININE 1.07 08/11/2021     08/11/2021    K 4.3 08/11/2021     08/11/2021    CO2 29.1 (H) 08/11/2021    CALCIUM 9.3 08/11/2021    PROTEINTOT 6.6 08/11/2021    ALBUMIN 4.20 08/11/2021    BILITOT 0.3 08/11/2021    ALKPHOS 44 08/11/2021    AST 21 08/11/2021    ALT 15 08/11/2021             ASSESSMENT & PLAN:  1.  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  Tinnitus  3.  Fungal nail infection  4.  Osteoarthritis  5.  Sleep apnea on CPAP  6.  History of right inguinal herniorrhaphy  7.  Diverticulosis  8.  Hypertension    Hematology history timeline:  -3/8/2021 white count 39,560 with hemoglobin 14.1, platelets 138,000, 81% lymphocytes.  Peripheral smear shows smudge cells with atypical lymphocytes with enlarged nuclei with inconspicuous nucleoli.  An adequate volume for flow cytometry on initial blood draw.  C-reactive protein less than 0.3.  PSA normal 3.69 glucose 119 otherwise unremarkable CMP.      -3/19/2021 [facility] hematology oncology initial consultation: We will get peripheral blood flow cytometry flow cytometric hematolymphoid neoplasia assessment including ZAP70/CD38, cytogenetics, B-cell rearrangement, immunoglobulin variable heavy chain and p53 mutational analysis sequencing will be done, as well as snip MicroArray for CLL.  Absolute neutrophil count is normal at 4350 with absolute lymphocyte count 32,040.  Without frequent infections I will not check quantitative immunoglobulins.  CT scans are not necessary for diagnosis, surveillance, routine monitoring of treatment response, or progression.  CT scans may be warranted if symptoms of bulky disease or the assessment of risk for tumor lysis syndrome  prior to initiation of venetoclax might be considered.  We will check lactate dehydrogenase, beta-2 microglobulin and uric acid.  Lymphocytosis itself is not an indication for treatment.  NCCN guideline indications for treatment include:  Fatigue severe  Night sweats  Unexplained weight loss  Fever without infection  Threatened endorgan function due to bulk  Spleen greater than 6 cm below costal margin  Lymph nodes greater than 10 cm  Progressive nonhemolytic anemia hemoglobin less than 10 (hemolytic anemia treated with steroids)  Progressive nonimmune mediated destruction thrombocytopenia platelets less than 100,000 (ITP)  Steroid refractory cytopenias    If these thresholds are reached, the molecular mutational analysis I am doing will guide the specific therapy I recommend.  Presently he does not have any of those clinical thresholds.  He does have swollen MCP and PIP joints with brothers who have psoriatic arthritis and I will check his sedimentation rate, C-reactive protein, rheumatoid factor, and extractable nuclear antigens as well and ultimately may need rheumatological referral.  Rheumatologic diseases can cause some lymphocytosis including monoclonal lymphocytic process but this would be higher lymphocyte count than I would expect from such processes.    -3/19/2021 data:  White count 36,900, hemoglobin 14.2, platelets 154,000, absolute neutrophil count 7900, absolute lymphocyte count 26,500.  CMP glucose 112, bicarb 32, otherwise unremarkable with creatinine 1.19 and normal liver enzymes.  Magnesium 2.1  Hemolytic panel: MARIS negative.  .  Uric acid normal 6.8.  ELLIE negative.  Rheumatoid factor negative.  proBNP normal 6.1  Sedimentation rate less than 1 with C-reactive protein 0.38.  Beta-2 microglobulin 3.5 upper limit 2.2  Flow cytometry: Clonal CD5 positive B-cell population 70% of analyzed cells consistent with CLL/SLL  ZAP70 positive/CD 38+: Double positive considered unfavorable and tends to  correlate with immunoglobulin variable heavy chain unmutated status.  Immunoglobulin variable heavy chain somatic hyper mutation analysis did not yield interpretable results.  Cancer cytogenetic analysis showed 47, XY, +12 in all 20 metaphases cells.  CLL FISH panel positive for trisomy 12 which is detected in 20% of B-cell CLL which is associated with an intermediate-poor prognosis  B-cell immunoglobulin heavy and kappa light chain gene rearrangements detected corroborating of B-cell neoplasm.  T p53 mutation analysis negative by DNA sequencing.    -4/9/2021 [facility] medical oncology follow-up visit: I reviewed the above molecular data with the patient.  The ZAP70 and CD38 and trisomy 12 positivity put him at an intermediate to high risk of progression I will watch him fairly closely.  I will repeat his CBC and CMP and physical exam in 2 months.  Thresholds for therapy as outlined above.  Ultimately what we choose to treat at that point depends on the COVID-19 pandemic status as much as his disease markers but he has not p53 mutated so we have a broader range of options.    -6/10/2021 [facility] hematology follow-up visit: 6/4/2021 data reveals white count 43,850 with hemoglobin 12.8 platelets 147,000.  Absolute neutrophil count normal 4820.  No bulky adenopathy.  We will repeat labs again in 2 months.  Indications/thresholds for therapy for CLL as indicated above have not been reached.    -9/10/2021 [facility] medical oncology follow-up visit: White count today is 52,700.  Hemoglobin is 13.9 with platelets 196,000 and no bulky adenopathy, fevers, chills, unexplained weight loss, effusions, bed drenching night sweats.  Hence has not reached NCCN guideline indications for treatment as outlined above.  We will see him back in 3 months with monthly CBC in the interim.  He has not had doubling of his lymphocyte count in less than 6 months either.  Recommended Covid vaccination with booster.    Total time of care inclusive of  time spent today prior to arrival reviewing interval data and my nurse practitioners interval notes and during visit translating this information and plan as outlined above to him and after visit arranging for this plan took 30 minutes of patient care time throughout the day today.  Levi Saunders MD    09/10/2021

## 2021-09-22 ENCOUNTER — TELEPHONE (OUTPATIENT)
Dept: ONCOLOGY | Facility: CLINIC | Age: 61
End: 2021-09-22

## 2021-09-22 NOTE — TELEPHONE ENCOUNTER
Caller: JOSUE     Relationship to patient: [facility]    Best call back number: [phone] [place] [id number]        JOSUE STATED THAT THEY HAVE CALLED, LEFT SEVERAL MESSAGES, AND FAXED TO OUR [facility] DEPT SINCE MARCH AND HAVEN'T BEEN SUCCESSFUL.   THEY ARE NEEDING ANY OFFICE NOTES/ PATH/ OR REFERRALS FROM 03-01-21 FAXED [phone]    PLEASE CALL AND ADVISE   THANK YOU

## 2021-10-07 ENCOUNTER — TELEPHONE (OUTPATIENT)
Dept: INTERNAL MEDICINE | Facility: CLINIC | Age: 61
End: 2021-10-07

## 2021-10-07 NOTE — TELEPHONE ENCOUNTER
Called patient informed  him we received a physicians result form from [employer] and asked what it was for he stated that he works for [employer] and they do a yearly Biometric screening but due to covid they have to do it virtually this year so they need to know his weight , height , etc that is listed under the healthcare provider. I informed him you are out until Monday and you will address it then he said it is ok   Form is in folder

## 2021-10-14 ENCOUNTER — LAB (OUTPATIENT)
Dept: LAB | Facility: HOSPITAL | Age: 61
End: 2021-10-14

## 2021-10-14 DIAGNOSIS — C91.10 CLL (CHRONIC LYMPHOCYTIC LEUKEMIA) (HCC): ICD-10-CM

## 2021-10-14 LAB
BASOPHILS # BLD MANUAL: 0 10*3/MM3 (ref 0–0.2)
BASOPHILS NFR BLD AUTO: 0 % (ref 0–1.5)
DEPRECATED RDW RBC AUTO: 46.3 FL (ref 37–54)
EOSINOPHIL # BLD MANUAL: 0.47 10*3/MM3 (ref 0–0.4)
EOSINOPHIL NFR BLD MANUAL: 1 % (ref 0.3–6.2)
ERYTHROCYTE [DISTWIDTH] IN BLOOD BY AUTOMATED COUNT: 12.9 % (ref 12.3–15.4)
HCT VFR BLD AUTO: 38.3 % (ref 37.5–51)
HGB BLD-MCNC: 12.8 G/DL (ref 13–17.7)
LYMPHOCYTES # BLD MANUAL: 37.36 10*3/MM3 (ref 0.7–3.1)
LYMPHOCYTES NFR BLD MANUAL: 6 % (ref 5–12)
LYMPHOCYTES NFR BLD MANUAL: 78 % (ref 19.6–45.3)
MCH RBC QN AUTO: 32.5 PG (ref 26.6–33)
MCHC RBC AUTO-ENTMCNC: 33.4 G/DL (ref 31.5–35.7)
MCV RBC AUTO: 97.2 FL (ref 79–97)
MONOCYTES # BLD AUTO: 2.8 10*3/MM3 (ref 0.1–0.9)
NEUTROPHILS # BLD AUTO: 6.07 10*3/MM3 (ref 1.7–7)
NEUTROPHILS NFR BLD MANUAL: 13 % (ref 42.7–76)
PLAT MORPH BLD: NORMAL
PLATELET # BLD AUTO: 138 10*3/MM3 (ref 140–450)
PMV BLD AUTO: 10.3 FL (ref 6–12)
RBC # BLD AUTO: 3.94 10*6/MM3 (ref 4.14–5.8)
RBC MORPH BLD: NORMAL
VARIANT LYMPHS NFR BLD MANUAL: 2 % (ref 0–5)
WBC # BLD AUTO: 46.7 10*3/MM3 (ref 3.4–10.8)
WBC MORPH BLD: NORMAL

## 2021-10-14 PROCEDURE — 85007 BL SMEAR W/DIFF WBC COUNT: CPT

## 2021-10-14 PROCEDURE — 85025 COMPLETE CBC W/AUTO DIFF WBC: CPT

## 2021-10-14 PROCEDURE — 36415 COLL VENOUS BLD VENIPUNCTURE: CPT

## 2021-11-08 DIAGNOSIS — I10 ESSENTIAL HYPERTENSION: ICD-10-CM

## 2021-11-08 RX ORDER — LISINOPRIL 20 MG/1
TABLET ORAL
Qty: 30 TABLET | Refills: 0 | Status: SHIPPED | OUTPATIENT
Start: 2021-11-08 | End: 2021-12-20

## 2021-11-08 RX ORDER — CHLORTHALIDONE 25 MG/1
TABLET ORAL
Qty: 30 TABLET | Refills: 0 | Status: SHIPPED | OUTPATIENT
Start: 2021-11-08 | End: 2021-12-20

## 2021-11-08 NOTE — TELEPHONE ENCOUNTER
Rx Refill Note  Requested Prescriptions     Pending Prescriptions Disp Refills   • lisinopril (PRINIVIL,ZESTRIL) 20 MG tablet [Pharmacy Med Name: Lisinopril 20 MG Oral Tablet] 30 tablet 0     Sig: Take 1 tablet by mouth once daily   • chlorthalidone (HYGROTON) 25 MG tablet [Pharmacy Med Name: Chlorthalidone 25 MG Oral Tablet] 30 tablet 0     Sig: Take 1 tablet by mouth once daily      Last office visit with prescribing clinician: 8/11/2021      Next office visit with prescribing clinician: 2/11/2022            Jessica Juan RN  11/08/21, 17:01 EST

## 2021-11-15 ENCOUNTER — LAB (OUTPATIENT)
Dept: LAB | Facility: HOSPITAL | Age: 61
End: 2021-11-15

## 2021-11-15 DIAGNOSIS — C91.10 CLL (CHRONIC LYMPHOCYTIC LEUKEMIA) (HCC): ICD-10-CM

## 2021-11-15 LAB
BASOPHILS # BLD MANUAL: 0 10*3/MM3 (ref 0–0.2)
BASOPHILS NFR BLD AUTO: 0 % (ref 0–1.5)
DEPRECATED RDW RBC AUTO: 46.7 FL (ref 37–54)
EOSINOPHIL # BLD MANUAL: 0.55 10*3/MM3 (ref 0–0.4)
EOSINOPHIL NFR BLD MANUAL: 1 % (ref 0.3–6.2)
ERYTHROCYTE [DISTWIDTH] IN BLOOD BY AUTOMATED COUNT: 13.1 % (ref 12.3–15.4)
HCT VFR BLD AUTO: 40 % (ref 37.5–51)
HGB BLD-MCNC: 13.4 G/DL (ref 13–17.7)
LYMPHOCYTES # BLD MANUAL: 47.01 10*3/MM3 (ref 0.7–3.1)
LYMPHOCYTES NFR BLD MANUAL: 1 % (ref 5–12)
LYMPHOCYTES NFR BLD MANUAL: 81 % (ref 19.6–45.3)
MCH RBC QN AUTO: 32.4 PG (ref 26.6–33)
MCHC RBC AUTO-ENTMCNC: 33.5 G/DL (ref 31.5–35.7)
MCV RBC AUTO: 96.9 FL (ref 79–97)
MONOCYTES # BLD AUTO: 0.55 10*3/MM3 (ref 0.1–0.9)
NEUTROPHILS # BLD AUTO: 6.56 10*3/MM3 (ref 1.7–7)
NEUTROPHILS NFR BLD MANUAL: 12 % (ref 42.7–76)
PLAT MORPH BLD: NORMAL
PLATELET # BLD AUTO: 165 10*3/MM3 (ref 140–450)
PMV BLD AUTO: 10.7 FL (ref 6–12)
RBC # BLD AUTO: 4.13 10*6/MM3 (ref 4.14–5.8)
RBC MORPH BLD: NORMAL
VARIANT LYMPHS NFR BLD MANUAL: 5 % (ref 0–5)
WBC # BLD AUTO: 54.66 10*3/MM3 (ref 3.4–10.8)
WBC MORPH BLD: NORMAL

## 2021-11-15 PROCEDURE — 85025 COMPLETE CBC W/AUTO DIFF WBC: CPT

## 2021-11-15 PROCEDURE — 36415 COLL VENOUS BLD VENIPUNCTURE: CPT

## 2021-11-15 PROCEDURE — 85007 BL SMEAR W/DIFF WBC COUNT: CPT

## 2021-11-23 ENCOUNTER — TELEPHONE (OUTPATIENT)
Dept: INTERNAL MEDICINE | Facility: CLINIC | Age: 61
End: 2021-11-23

## 2021-11-23 NOTE — TELEPHONE ENCOUNTER
Orders faxed successfully to [facility] at [phone] and sent up front to scan into chart

## 2021-12-15 ENCOUNTER — LAB (OUTPATIENT)
Dept: LAB | Facility: HOSPITAL | Age: 61
End: 2021-12-15

## 2021-12-15 ENCOUNTER — TELEPHONE (OUTPATIENT)
Dept: ONCOLOGY | Facility: CLINIC | Age: 61
End: 2021-12-15

## 2021-12-15 DIAGNOSIS — C91.10 CLL (CHRONIC LYMPHOCYTIC LEUKEMIA) (HCC): ICD-10-CM

## 2021-12-15 LAB
BASOPHILS # BLD MANUAL: 0 10*3/MM3 (ref 0–0.2)
BASOPHILS NFR BLD MANUAL: 0 % (ref 0–1.5)
DEPRECATED RDW RBC AUTO: 44.2 FL (ref 37–54)
EOSINOPHIL # BLD MANUAL: 0.59 10*3/MM3 (ref 0–0.4)
EOSINOPHIL NFR BLD MANUAL: 1 % (ref 0.3–6.2)
ERYTHROCYTE [DISTWIDTH] IN BLOOD BY AUTOMATED COUNT: 12.8 % (ref 12.3–15.4)
HCT VFR BLD AUTO: 39.1 % (ref 37.5–51)
HGB BLD-MCNC: 13.2 G/DL (ref 13–17.7)
LYMPHOCYTES # BLD MANUAL: 51.42 10*3/MM3 (ref 0.7–3.1)
LYMPHOCYTES NFR BLD MANUAL: 3 % (ref 5–12)
MCH RBC QN AUTO: 32 PG (ref 26.6–33)
MCHC RBC AUTO-ENTMCNC: 33.8 G/DL (ref 31.5–35.7)
MCV RBC AUTO: 94.7 FL (ref 79–97)
MONOCYTES # BLD: 1.77 10*3/MM3 (ref 0.1–0.9)
NEUTROPHILS # BLD AUTO: 4.14 10*3/MM3 (ref 1.7–7)
NEUTROPHILS NFR BLD MANUAL: 6 % (ref 42.7–76)
NEUTS BAND NFR BLD MANUAL: 1 % (ref 0–5)
NRBC SPEC MANUAL: 0 /100 WBC (ref 0–0.2)
PLAT MORPH BLD: NORMAL
PLATELET # BLD AUTO: 142 10*3/MM3 (ref 140–450)
PMV BLD AUTO: 10.3 FL (ref 6–12)
PROLYMPHOCYTES NFR BLD MANUAL: 2 % (ref 0–0)
PROMYELOCYTES NFR BLD MANUAL: 0 % (ref 0–0)
RBC # BLD AUTO: 4.13 10*6/MM3 (ref 4.14–5.8)
RBC MORPH BLD: NORMAL
VARIANT LYMPHS NFR BLD MANUAL: 3 % (ref 0–5)
VARIANT LYMPHS NFR BLD MANUAL: 84 % (ref 19.6–45.3)
WBC MORPH BLD: NORMAL
WBC NRBC COR # BLD: 59.1 10*3/MM3 (ref 3.4–10.8)

## 2021-12-15 PROCEDURE — 85007 BL SMEAR W/DIFF WBC COUNT: CPT

## 2021-12-15 PROCEDURE — 85025 COMPLETE CBC W/AUTO DIFF WBC: CPT

## 2021-12-15 PROCEDURE — 36415 COLL VENOUS BLD VENIPUNCTURE: CPT

## 2021-12-15 NOTE — TELEPHONE ENCOUNTER
Name of Physician notified: Huma Somers, APRN    Time Physician Notified:  [id number]      []  Orders received    []  Protocol/Standing orders followed    [x]  No new orders

## 2021-12-15 NOTE — TELEPHONE ENCOUNTER
----- Message from Cinthia Dupont RN sent at 12/15/2021 12:31 PM EST -----  Regarding: Critical Lab      Critical Test Results      MD: Nicky    Date: 12/15     Critical test result:WBC 59.10    Time results received:12:32

## 2021-12-20 ENCOUNTER — OFFICE VISIT (OUTPATIENT)
Dept: ONCOLOGY | Facility: CLINIC | Age: 61
End: 2021-12-20

## 2021-12-20 VITALS
HEART RATE: 65 BPM | OXYGEN SATURATION: 96 % | SYSTOLIC BLOOD PRESSURE: 136 MMHG | HEIGHT: 71 IN | BODY MASS INDEX: 32.2 KG/M2 | WEIGHT: 230 LBS | DIASTOLIC BLOOD PRESSURE: 80 MMHG | TEMPERATURE: 98 F | RESPIRATION RATE: 20 BRPM

## 2021-12-20 DIAGNOSIS — I10 ESSENTIAL HYPERTENSION: ICD-10-CM

## 2021-12-20 DIAGNOSIS — C91.10 CLL (CHRONIC LYMPHOCYTIC LEUKEMIA) (HCC): Primary | Chronic | ICD-10-CM

## 2021-12-20 PROCEDURE — 99213 OFFICE O/P EST LOW 20 MIN: CPT | Performed by: NURSE PRACTITIONER

## 2021-12-20 RX ORDER — LISINOPRIL 20 MG/1
TABLET ORAL
Qty: 30 TABLET | Refills: 0 | Status: SHIPPED | OUTPATIENT
Start: 2021-12-20 | End: 2022-02-11 | Stop reason: SDUPTHER

## 2021-12-20 RX ORDER — CHLORTHALIDONE 25 MG/1
TABLET ORAL
Qty: 30 TABLET | Refills: 0 | Status: SHIPPED | OUTPATIENT
Start: 2021-12-20 | End: 2022-01-27

## 2021-12-20 NOTE — PROGRESS NOTES
CHIEF COMPLAINT: Follow-up asymptomatic CLL    Problem List:  Oncology/Hematology History Overview Note   1.  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  Tinnitus  3.  Fungal nail infection  4.  Osteoarthritis  5.  Sleep apnea on CPAP  6.  History of right inguinal herniorrhaphy  7.  Diverticulosis  8.  Hypertension    Hematology history timeline:  -3/8/2021 white count 39,560 with hemoglobin 14.1, platelets 138,000, 81% lymphocytes.  Peripheral smear shows smudge cells with atypical lymphocytes with enlarged nuclei with inconspicuous nucleoli.  An adequate volume for flow cytometry on initial blood draw.  C-reactive protein less than 0.3.  PSA normal 3.69 glucose 119 otherwise unremarkable CMP.      -3/19/2021 [facility] hematology oncology initial consultation: We will get peripheral blood flow cytometry flow cytometric hematolymphoid neoplasia assessment including ZAP70/CD38, cytogenetics, B-cell rearrangement, immunoglobulin variable heavy chain and p53 mutational analysis sequencing will be done, as well as snip MicroArray for CLL.  Absolute neutrophil count is normal at 4350 with absolute lymphocyte count 32,040.  Without frequent infections I will not check quantitative immunoglobulins.  CT scans are not necessary for diagnosis, surveillance, routine monitoring of treatment response, or progression.  CT scans may be warranted if symptoms of bulky disease or the assessment of risk for tumor lysis syndrome prior to initiation of venetoclax might be considered.  We will check lactate dehydrogenase, beta-2 microglobulin and uric acid.  Lymphocytosis itself is not an indication for treatment.  NCCN guideline indications for treatment include:  Fatigue severe  Night sweats  Unexplained weight loss  Fever without infection  Threatened endorgan function due to bulk  Spleen greater than 6 cm below costal margin  Lymph nodes greater  than 10 cm  Progressive nonhemolytic anemia hemoglobin less than 10 (hemolytic anemia treated with steroids)  Progressive nonimmune mediated destruction thrombocytopenia platelets less than 100,000 (ITP)  Steroid refractory cytopenias    If these thresholds are reached, the molecular mutational analysis I am doing will guide the specific therapy I recommend.  Presently he does not have any of those clinical thresholds.  He does have swollen MCP and PIP joints with brothers who have psoriatic arthritis and I will check his sedimentation rate, C-reactive protein, rheumatoid factor, and extractable nuclear antigens as well and ultimately may need rheumatological referral.  Rheumatologic diseases can cause some lymphocytosis including monoclonal lymphocytic process but this would be higher lymphocyte count than I would expect from such processes.    -3/19/2021 data:  White count 36,900, hemoglobin 14.2, platelets 154,000, absolute neutrophil count 7900, absolute lymphocyte count 26,500.  CMP glucose 112, bicarb 32, otherwise unremarkable with creatinine 1.19 and normal liver enzymes.  Magnesium 2.1  Hemolytic panel: MAIRS negative.  .  Uric acid normal 6.8.  ELLIE negative.  Rheumatoid factor negative.  proBNP normal 6.1  Sedimentation rate less than 1 with C-reactive protein 0.38.  Beta-2 microglobulin 3.5 upper limit 2.2  Flow cytometry: Clonal CD5 positive B-cell population 70% of analyzed cells consistent with CLL/SLL  ZAP70 positive/CD 38+: Double positive considered unfavorable and tends to correlate with immunoglobulin variable heavy chain unmutated status.  Immunoglobulin variable heavy chain somatic hyper mutation analysis did not yield interpretable results.  Cancer cytogenetic analysis showed 47, XY, +12 in all 20 metaphases cells.  CLL FISH panel positive for trisomy 12 which is detected in 20% of B-cell CLL which is associated with an intermediate-poor prognosis  B-cell immunoglobulin heavy and kappa  light chain gene rearrangements detected corroborating of B-cell neoplasm.  T p53 mutation analysis negative by DNA sequencing.    -4/9/2021 [facility] medical oncology follow-up visit: I reviewed the above molecular data with the patient.  The ZAP70 and CD38 and trisomy 12 positivity put him at an intermediate to high risk of progression I will watch him fairly closely.  I will repeat his CBC and CMP and physical exam in 2 months.  Thresholds for therapy as outlined above.  Ultimately what we choose to treat at that point depends on the COVID-19 pandemic status as much as his disease markers but he has not p53 mutated so we have a broader range of options.    -6/10/2021 [facility] hematology follow-up visit: 6/4/2021 data reveals white count 43,850 with hemoglobin 12.8 platelets 147,000.  Absolute neutrophil count normal 4820.  No bulky adenopathy.  We will repeat labs again in 2 months.  Indications/thresholds for therapy for CLL as indicated above have not been reached.    -9/10/2021 [facility] oncology follow-up visit: White count today is 52,700.  Hemoglobin is 13.9 with platelets 196,000 and no bulky adenopathy, fevers, chills, unexplained weight loss, effusions, bed drenching night sweats.  Hence has not reached NCCN guideline indications for treatment as outlined above.  We will see him back in 3 months with monthly CBC in the interim.  He has not had doubling of his lymphocyte count in less than 6 months either.  Recommended Covid vaccination with booster.     CLL (chronic lymphocytic leukemia) ([facility])   3/19/2021 Initial Diagnosis    CLL (chronic lymphocytic leukemia) (CMS/[facility])     4/9/2021 Cancer Staged    Staging form: Chronic Lymphocytic Leukemia / Small Lymphocytic Lymphoma, AJCC 8th Edition  - Clinical: Modified Mina Stage 0 (Modified Mina risk: Low, Lymphocytosis: Present, Adenopathy: Absent, Organomegaly: Absent, Anemia: Absent, Thrombocytopenia: Absent) - Signed by Levi Saunders MD on 4/9/2021    "      HISTORY OF PRESENT ILLNESS:  The patient is a 61 y.o. male, here for follow up on management of asymptomatic CLL.  No new complaints.  No frequent infections.    Past Medical History:   Diagnosis Date   • Cataract     Bilateral   • Diverticulosis    • Essential hypertension 03/2021    3/21 started Lisinopril   • Lymphocytosis 3/8/2021    3/2021. WBC 39.56. Abs lymphocyte 38. Plt 138. Hgb 14.1. Referral to hematology. CLL?   • Nonrheumatic mitral valve regurgitation 3/3/2021    Systolic murmur 4/6 noted 3/2021. TTE EF 56 - 60%. Mild to moderate mitral valve regurgitation, eccentric-anteriorly directed   • Osteoarthritis of knee    • Prediabetes 3/8/2021    3/2021 A1c 6.49   • Presbycusis of both ears 4/9/2021   • Pseudopolyp of sigmoid colon without complication (HCC) 4/9/2021   • Sensorineural hearing loss (SNHL) of both ears 3/17/2021    3/2021 ENT. Also tinnitus. Recommended hearing aids   • Sleep apnea     On CPAP consistently   • Tubular adenoma of colon 3/3/2021    4/2021: Tubular adenoma of sigmoid. No high grade.     Past Surgical History:   Procedure Laterality Date   • COLONOSCOPY      4/8/21   • FINGER GANGLION CYST EXCISION Right     3rd finger in the 90s   • INGUINAL HERNIA REPAIR Right        No Known Allergies    Family History and Social History reviewed and changed as necessary    REVIEW OF SYSTEM:   No frequent infections.  No other somatic complaints    PHYSICAL EXAM:  No palpable cervical axillary or inguinal adenopathy.  No hepatosplenomegaly.    Vitals:    12/20/21 1014   BP: 136/80   Pulse: 65   Resp: 20   Temp: 98 °F (36.7 °C)   SpO2: 96%   Weight: 104 kg (230 lb)   Height: 180.3 cm (71\")     Vitals:    12/20/21 1014   PainSc: 0-No pain          ECOG score: 0           Vitals reviewed.  Labs reviewed.      Lab Results   Component Value Date    HGB 13.2 12/15/2021    HCT 39.1 12/15/2021    MCV 94.7 12/15/2021     12/15/2021    WBC 59.10 (C) 12/15/2021    NEUTROABS 4.14 12/15/2021 "    LYMPHSABS 40.10 (H) 09/10/2021    MONOSABS 3.60 (H) 09/10/2021    EOSABS 0.59 (H) 12/15/2021    BASOSABS 0.00 12/15/2021         ASSESSMENT & PLAN:  1.  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  Tinnitus  3.  Fungal nail infection  4.  Osteoarthritis  5.  Sleep apnea on CPAP  6.  History of right inguinal herniorrhaphy  7.  Diverticulosis  8.  Hypertension    Hematology history timeline:  -3/8/2021 white count 39,560 with hemoglobin 14.1, platelets 138,000, 81% lymphocytes.  Peripheral smear shows smudge cells with atypical lymphocytes with enlarged nuclei with inconspicuous nucleoli.  An adequate volume for flow cytometry on initial blood draw.  C-reactive protein less than 0.3.  PSA normal 3.69 glucose 119 otherwise unremarkable CMP.      -3/19/2021 [facility] hematology oncology initial consultation: We will get peripheral blood flow cytometry flow cytometric hematolymphoid neoplasia assessment including ZAP70/CD38, cytogenetics, B-cell rearrangement, immunoglobulin variable heavy chain and p53 mutational analysis sequencing will be done, as well as snip MicroArray for CLL.  Absolute neutrophil count is normal at 4350 with absolute lymphocyte count 32,040.  Without frequent infections I will not check quantitative immunoglobulins.  CT scans are not necessary for diagnosis, surveillance, routine monitoring of treatment response, or progression.  CT scans may be warranted if symptoms of bulky disease or the assessment of risk for tumor lysis syndrome prior to initiation of venetoclax might be considered.  We will check lactate dehydrogenase, beta-2 microglobulin and uric acid.  Lymphocytosis itself is not an indication for treatment.  NCCN guideline indications for treatment include:  Fatigue severe  Night sweats  Unexplained weight loss  Fever without infection  Threatened endorgan function due to bulk  Spleen greater than  6 cm below costal margin  Lymph nodes greater than 10 cm  Progressive nonhemolytic anemia hemoglobin less than 10 (hemolytic anemia treated with steroids)  Progressive nonimmune mediated destruction thrombocytopenia platelets less than 100,000 (ITP)  Steroid refractory cytopenias    If these thresholds are reached, the molecular mutational analysis I am doing will guide the specific therapy I recommend.  Presently he does not have any of those clinical thresholds.  He does have swollen MCP and PIP joints with brothers who have psoriatic arthritis and I will check his sedimentation rate, C-reactive protein, rheumatoid factor, and extractable nuclear antigens as well and ultimately may need rheumatological referral.  Rheumatologic diseases can cause some lymphocytosis including monoclonal lymphocytic process but this would be higher lymphocyte count than I would expect from such processes.    -3/19/2021 data:  White count 36,900, hemoglobin 14.2, platelets 154,000, absolute neutrophil count 7900, absolute lymphocyte count 26,500.  CMP glucose 112, bicarb 32, otherwise unremarkable with creatinine 1.19 and normal liver enzymes.  Magnesium 2.1  Hemolytic panel: MARIS negative.  .  Uric acid normal 6.8.  ELLIE negative.  Rheumatoid factor negative.  proBNP normal 6.1  Sedimentation rate less than 1 with C-reactive protein 0.38.  Beta-2 microglobulin 3.5 upper limit 2.2  Flow cytometry: Clonal CD5 positive B-cell population 70% of analyzed cells consistent with CLL/SLL  ZAP70 positive/CD 38+: Double positive considered unfavorable and tends to correlate with immunoglobulin variable heavy chain unmutated status.  Immunoglobulin variable heavy chain somatic hyper mutation analysis did not yield interpretable results.  Cancer cytogenetic analysis showed 47, XY, +12 in all 20 metaphases cells.  CLL FISH panel positive for trisomy 12 which is detected in 20% of B-cell CLL which is associated with an intermediate-poor  prognosis  B-cell immunoglobulin heavy and kappa light chain gene rearrangements detected corroborating of B-cell neoplasm.  T p53 mutation analysis negative by DNA sequencing.    -4/9/2021 [facility] [place] medical oncology follow-up visit: I reviewed the above molecular data with the patient.  The ZAP70 and CD38 and trisomy 12 positivity put him at an intermediate to high risk of progression I will watch him fairly closely.  I will repeat his CBC and CMP and physical exam in 2 months.  Thresholds for therapy as outlined above.  Ultimately what we choose to treat at that point depends on the COVID-19 pandemic status as much as his disease markers but he has not p53 mutated so we have a broader range of options.    -6/10/2021 [facility] [place] hematology follow-up visit: 6/4/2021 data reveals white count 43,850 with hemoglobin 12.8 platelets 147,000.  Absolute neutrophil count normal 4820.  No bulky adenopathy.  We will repeat labs again in 2 months.  Indications/thresholds for therapy for CLL as indicated above have not been reached.    -9/10/2021 [facility] [place] medical oncology follow-up visit: White count today is 52,700.  Hemoglobin is 13.9 with platelets 196,000 and no bulky adenopathy, fevers, chills, unexplained weight loss, effusions, bed drenching night sweats.  Hence has not reached NCCN guideline indications for treatment as outlined above.  We will see him back in 3 months with monthly CBC in the interim.  He has not had doubling of his lymphocyte count in less than 6 months either.  Recommended Covid vaccination with booster.    -12/20/2021 [facility] [place] Oncology clinic follow-up:  Continues to do well on surveillance, white count stable currently at 59,100, absolute lymphocytes 51.42, no anemia or thrombocytopenia.  He has no lymphadenopathy or other symptoms suggestive of disease progression.  We will continue with monthly CBC.  We will see him back in 3 months for follow-up.  If counts remain stable at that time we may be able to  extend the time frame of checking his CBC out a little bit.  Indications for treatment per NCCN guidelines are listed above note dated 3/19/2021.    Return to clinic in 3 months for follow-up.    I spent 20 minutes caring for Sigifredo on this date of service. This time includes time spent by me in the following activities: preparing for the visit, reviewing tests, performing a medically appropriate examination and/or evaluation, ordering medications, tests, or procedures and documenting information in the medical record.     Huma Somers, APRN    12/20/2021

## 2022-01-20 ENCOUNTER — LAB (OUTPATIENT)
Dept: LAB | Facility: HOSPITAL | Age: 62
End: 2022-01-20

## 2022-01-20 DIAGNOSIS — C91.10 CLL (CHRONIC LYMPHOCYTIC LEUKEMIA): ICD-10-CM

## 2022-01-20 LAB
BASOPHILS # BLD MANUAL: 0 10*3/MM3 (ref 0–0.2)
BASOPHILS NFR BLD MANUAL: 0 % (ref 0–1.5)
DEPRECATED RDW RBC AUTO: 44.3 FL (ref 37–54)
EOSINOPHIL # BLD MANUAL: 0 10*3/MM3 (ref 0–0.4)
EOSINOPHIL NFR BLD MANUAL: 0 % (ref 0.3–6.2)
ERYTHROCYTE [DISTWIDTH] IN BLOOD BY AUTOMATED COUNT: 13 % (ref 12.3–15.4)
HCT VFR BLD AUTO: 38.2 % (ref 37.5–51)
HGB BLD-MCNC: 13.2 G/DL (ref 13–17.7)
LYMPHOCYTES # BLD MANUAL: 51.72 10*3/MM3 (ref 0.7–3.1)
LYMPHOCYTES NFR BLD MANUAL: 2 % (ref 5–12)
MCH RBC QN AUTO: 32.4 PG (ref 26.6–33)
MCHC RBC AUTO-ENTMCNC: 34.6 G/DL (ref 31.5–35.7)
MCV RBC AUTO: 93.9 FL (ref 79–97)
MONOCYTES # BLD: 1.23 10*3/MM3 (ref 0.1–0.9)
NEUTROPHILS # BLD AUTO: 6.16 10*3/MM3 (ref 1.7–7)
NEUTROPHILS NFR BLD MANUAL: 10 % (ref 42.7–76)
PLAT MORPH BLD: NORMAL
PLATELET # BLD AUTO: 181 10*3/MM3 (ref 140–450)
PMV BLD AUTO: 10.5 FL (ref 6–12)
PROLYMPHOCYTES NFR BLD MANUAL: 4 % (ref 0–0)
RBC # BLD AUTO: 4.07 10*6/MM3 (ref 4.14–5.8)
RBC MORPH BLD: NORMAL
VARIANT LYMPHS NFR BLD MANUAL: 10 % (ref 0–5)
VARIANT LYMPHS NFR BLD MANUAL: 74 % (ref 19.6–45.3)
WBC MORPH BLD: NORMAL
WBC NRBC COR # BLD: 61.57 10*3/MM3 (ref 3.4–10.8)

## 2022-01-20 PROCEDURE — 36415 COLL VENOUS BLD VENIPUNCTURE: CPT

## 2022-01-20 PROCEDURE — 85007 BL SMEAR W/DIFF WBC COUNT: CPT

## 2022-01-20 PROCEDURE — 85025 COMPLETE CBC W/AUTO DIFF WBC: CPT

## 2022-01-27 DIAGNOSIS — I10 ESSENTIAL HYPERTENSION: ICD-10-CM

## 2022-01-27 RX ORDER — CHLORTHALIDONE 25 MG/1
TABLET ORAL
Qty: 30 TABLET | Refills: 0 | Status: SHIPPED | OUTPATIENT
Start: 2022-01-27 | End: 2022-02-11 | Stop reason: SDUPTHER

## 2022-02-11 ENCOUNTER — LAB (OUTPATIENT)
Dept: LAB | Facility: HOSPITAL | Age: 62
End: 2022-02-11

## 2022-02-11 ENCOUNTER — OFFICE VISIT (OUTPATIENT)
Dept: INTERNAL MEDICINE | Facility: CLINIC | Age: 62
End: 2022-02-11

## 2022-02-11 VITALS
HEIGHT: 71 IN | DIASTOLIC BLOOD PRESSURE: 70 MMHG | SYSTOLIC BLOOD PRESSURE: 100 MMHG | HEART RATE: 66 BPM | OXYGEN SATURATION: 99 % | WEIGHT: 228.4 LBS | TEMPERATURE: 98 F | BODY MASS INDEX: 31.98 KG/M2

## 2022-02-11 DIAGNOSIS — I10 ESSENTIAL HYPERTENSION: Primary | Chronic | ICD-10-CM

## 2022-02-11 DIAGNOSIS — B35.1 FUNGAL TOENAIL INFECTION: ICD-10-CM

## 2022-02-11 DIAGNOSIS — M25.473 ANKLE SWELLING, UNSPECIFIED LATERALITY: ICD-10-CM

## 2022-02-11 DIAGNOSIS — R73.03 PREDIABETES: Chronic | ICD-10-CM

## 2022-02-11 DIAGNOSIS — I10 ESSENTIAL HYPERTENSION: Chronic | ICD-10-CM

## 2022-02-11 DIAGNOSIS — C91.10 CLL (CHRONIC LYMPHOCYTIC LEUKEMIA): Chronic | ICD-10-CM

## 2022-02-11 DIAGNOSIS — I10 ESSENTIAL HYPERTENSION: ICD-10-CM

## 2022-02-11 PROCEDURE — 99214 OFFICE O/P EST MOD 30 MIN: CPT | Performed by: STUDENT IN AN ORGANIZED HEALTH CARE EDUCATION/TRAINING PROGRAM

## 2022-02-11 PROCEDURE — 83036 HEMOGLOBIN GLYCOSYLATED A1C: CPT

## 2022-02-11 PROCEDURE — 80053 COMPREHEN METABOLIC PANEL: CPT

## 2022-02-11 RX ORDER — IBUPROFEN 200 MG
400 TABLET ORAL AS NEEDED
COMMUNITY
End: 2022-02-14 | Stop reason: SINTOL

## 2022-02-11 RX ORDER — CHLORTHALIDONE 25 MG/1
25 TABLET ORAL DAILY
Qty: 90 TABLET | Refills: 1 | Status: SHIPPED | OUTPATIENT
Start: 2022-02-11 | End: 2022-12-20 | Stop reason: SDUPTHER

## 2022-02-11 RX ORDER — TERBINAFINE HYDROCHLORIDE 250 MG/1
250 TABLET ORAL DAILY
Qty: 90 TABLET | Refills: 1 | Status: SHIPPED | OUTPATIENT
Start: 2022-02-11 | End: 2022-05-11 | Stop reason: HOSPADM

## 2022-02-11 RX ORDER — LISINOPRIL 20 MG/1
20 TABLET ORAL DAILY
Qty: 90 TABLET | Refills: 1 | Status: SHIPPED | OUTPATIENT
Start: 2022-02-11 | End: 2022-02-14 | Stop reason: SDUPTHER

## 2022-02-11 NOTE — PROGRESS NOTES
"Chief Complaint  Sigifredo Walsh is a 61 y.o. male presenting for Hypertension (f/u) and discuss medication (Chlothalidone).     Patient has a past medical history of prediabetes, diverticulosis, hypertension, obesity, osteoarthritis of the knee, KARRIE on CPAP and CLL (2021, f/u Dr. Saunders, [facility]) and mitral valve regurgitation.    History of Present Illness  Patient is here for follow-up.    Patient is overall doing well.  He continues to follow-up with Dr. Saunders for his CLL, his white count has been slowly increasing.  He has not yet been on medications for his CLL.    He restarted the terbinafine due to recurrence of fungus, now it appears to be improving.    He has noticed occasional swelling of his ankles, and he has tried his wife's hydrochlorothiazide 12.5 mg a couple of times, which has helped a lot.    The following portions of the patient's history were reviewed and updated as appropriate: allergies, current medications, past family history, past medical history, past social history, past surgical history and problem list.    Objective  /70 (BP Location: Left arm, Patient Position: Sitting, Cuff Size: Adult)   Pulse 66   Temp 98 °F (36.7 °C) (Temporal)   Ht 180.3 cm (70.98\")   Wt 104 kg (228 lb 6.4 oz)   SpO2 99%   BMI 31.87 kg/m²     Physical Exam  Vitals reviewed.   Constitutional:       Appearance: Normal appearance.   HENT:      Head: Normocephalic and atraumatic.      Nose: No congestion.   Eyes:      Extraocular Movements: Extraocular movements intact.      Conjunctiva/sclera: Conjunctivae normal.   Cardiovascular:      Rate and Rhythm: Normal rate and regular rhythm.      Heart sounds: Murmur heard.       Pulmonary:      Effort: Pulmonary effort is normal.      Breath sounds: Normal breath sounds.   Abdominal:      General: There is no distension.      Palpations: Abdomen is soft. There is no mass.      Tenderness: There is no abdominal tenderness.   Musculoskeletal:      Cervical " back: Neck supple.      Right lower leg: No edema.      Left lower leg: No edema.   Skin:     General: Skin is warm and dry.   Neurological:      Mental Status: He is alert and oriented to person, place, and time. Mental status is at baseline.   Psychiatric:         Behavior: Behavior normal.         Thought Content: Thought content normal.         Assessment/Plan   1. Essential hypertension  BP Readings from Last 3 Encounters:   02/11/22 100/70   12/20/21 136/80   09/10/21 118/70   Stable and at goal.  No orthostatic symptoms.  If he would start experiencing dizziness I would reduce his blood pressure medication.  For his episodic lower extremity swelling I recommend he does not take hydrochlorothiazide from his wife since he is already on chlorthalidone.  If it does become a problem I recommend he comes back so we can review his medications.  No significant edema today.  - Comprehensive Metabolic Panel; Future    2. Prediabetes  Hemoglobin A1C   Date Value Ref Range Status   08/11/2021 5.94 (H) 4.80 - 5.60 % Final   03/08/2021 6.49 (H) 4.80 - 5.60 % Final   We will recheck A1c today.  Overall stable.  - Hemoglobin A1c; Future    3. CLL (chronic lymphocytic leukemia) (HCC)  Slowly progressing.  Patient also mentions occasional mild bruising, his platelets are normal.  This may be related to his CLL, he can ask Dr. Saunders when he sees him.    4. Ankle swelling, unspecified laterality  No swelling today.      Return in about 3 months (around 5/11/2022) for Annual physical.    Future Appointments       Provider Department Center    3/21/2022 10:00 AM Levi Saunders MD [facility] HEMATOLOGY & ONCOLOGY [facility]    5/11/2022 8:15 AM Jasper Zhao MD [facility] INTERNAL MEDICINE [facility]            Jasper Zhao MD  Family Medicine  02/11/2022

## 2022-02-11 NOTE — TELEPHONE ENCOUNTER
PT IS WANTING TO KNOW IF WE CAN UP HIS QTY AMOUNT/ REFILL AMOUNT  ON HIS MEDICAINE SO THE PHARMACY DONT  HAVE TO KEEP CALLING TO GET THEM FILLED   no

## 2022-02-12 LAB
ALBUMIN SERPL-MCNC: 4.2 G/DL (ref 3.5–5.2)
ALBUMIN/GLOB SERPL: 1.8 G/DL
ALP SERPL-CCNC: 44 U/L (ref 39–117)
ALT SERPL W P-5'-P-CCNC: 12 U/L (ref 1–41)
ANION GAP SERPL CALCULATED.3IONS-SCNC: 7.1 MMOL/L (ref 5–15)
AST SERPL-CCNC: 9 U/L (ref 1–40)
BILIRUB SERPL-MCNC: 0.4 MG/DL (ref 0–1.2)
BUN SERPL-MCNC: 16 MG/DL (ref 8–23)
BUN/CREAT SERPL: 9.4 (ref 7–25)
CALCIUM SPEC-SCNC: 9.7 MG/DL (ref 8.6–10.5)
CHLORIDE SERPL-SCNC: 101 MMOL/L (ref 98–107)
CO2 SERPL-SCNC: 30.9 MMOL/L (ref 22–29)
CREAT SERPL-MCNC: 1.71 MG/DL (ref 0.76–1.27)
GFR SERPL CREATININE-BSD FRML MDRD: 41 ML/MIN/1.73
GLOBULIN UR ELPH-MCNC: 2.4 GM/DL
GLUCOSE SERPL-MCNC: 109 MG/DL (ref 65–99)
HBA1C MFR BLD: 6.3 % (ref 4.8–5.6)
POTASSIUM SERPL-SCNC: 4.6 MMOL/L (ref 3.5–5.2)
PROT SERPL-MCNC: 6.6 G/DL (ref 6–8.5)
SODIUM SERPL-SCNC: 139 MMOL/L (ref 136–145)

## 2022-02-14 DIAGNOSIS — I10 ESSENTIAL HYPERTENSION: ICD-10-CM

## 2022-02-14 DIAGNOSIS — R79.89 ELEVATED SERUM CREATININE: Primary | ICD-10-CM

## 2022-02-14 RX ORDER — LISINOPRIL 10 MG/1
10 TABLET ORAL DAILY
Qty: 90 TABLET | Refills: 1 | Status: SHIPPED | OUTPATIENT
Start: 2022-02-14 | End: 2022-09-02

## 2022-02-14 NOTE — PROGRESS NOTES
Patient noted with increased creatinine.  Will decrease lisinopril from 20 to 10 mg.  Also stop ibuprofen/NSAIDs.  Check UA and microscopy.  I have also recommended ultrasound to rule out hydronephrosis.  He does have a history of CLL.    1. Essential hypertension  - lisinopril (PRINIVIL,ZESTRIL) 10 MG tablet; Take 1 tablet by mouth Daily.  Dispense: 90 tablet; Refill: 1    2. Elevated serum creatinine  - Basic Metabolic Panel; Future  - Urinalysis With Microscopic - Urine, Clean Catch; Future    Jasper Zhao MD

## 2022-02-16 ENCOUNTER — TELEPHONE (OUTPATIENT)
Dept: ONCOLOGY | Facility: CLINIC | Age: 62
End: 2022-02-16

## 2022-02-16 ENCOUNTER — LAB (OUTPATIENT)
Dept: LAB | Facility: HOSPITAL | Age: 62
End: 2022-02-16

## 2022-02-16 DIAGNOSIS — C91.10 CLL (CHRONIC LYMPHOCYTIC LEUKEMIA): ICD-10-CM

## 2022-02-16 DIAGNOSIS — R79.89 ELEVATED SERUM CREATININE: ICD-10-CM

## 2022-02-16 LAB
ANION GAP SERPL CALCULATED.3IONS-SCNC: 5.5 MMOL/L (ref 5–15)
BACTERIA UR QL AUTO: NORMAL /HPF
BASOPHILS # BLD MANUAL: 0.65 10*3/MM3 (ref 0–0.2)
BASOPHILS NFR BLD MANUAL: 1 % (ref 0–1.5)
BILIRUB UR QL STRIP: NEGATIVE
BUN SERPL-MCNC: 22 MG/DL (ref 8–23)
BUN/CREAT SERPL: 19.8 (ref 7–25)
CALCIUM SPEC-SCNC: 9.8 MG/DL (ref 8.6–10.5)
CHLORIDE SERPL-SCNC: 99 MMOL/L (ref 98–107)
CLARITY UR: CLEAR
CO2 SERPL-SCNC: 30.5 MMOL/L (ref 22–29)
COLOR UR: YELLOW
CREAT SERPL-MCNC: 1.11 MG/DL (ref 0.76–1.27)
DEPRECATED RDW RBC AUTO: 45.8 FL (ref 37–54)
EOSINOPHIL # BLD MANUAL: 0.65 10*3/MM3 (ref 0–0.4)
EOSINOPHIL NFR BLD MANUAL: 1 % (ref 0.3–6.2)
ERYTHROCYTE [DISTWIDTH] IN BLOOD BY AUTOMATED COUNT: 13.2 % (ref 12.3–15.4)
GFR SERPL CREATININE-BSD FRML MDRD: 67 ML/MIN/1.73
GLUCOSE SERPL-MCNC: 98 MG/DL (ref 65–99)
GLUCOSE UR STRIP-MCNC: NEGATIVE MG/DL
HCT VFR BLD AUTO: 38.2 % (ref 37.5–51)
HGB BLD-MCNC: 12.9 G/DL (ref 13–17.7)
HGB UR QL STRIP.AUTO: NEGATIVE
HYALINE CASTS UR QL AUTO: NORMAL /LPF
KETONES UR QL STRIP: NEGATIVE
LEUKOCYTE ESTERASE UR QL STRIP.AUTO: NEGATIVE
LYMPHOCYTES # BLD MANUAL: 55.49 10*3/MM3 (ref 0.7–3.1)
LYMPHOCYTES NFR BLD MANUAL: 4 % (ref 5–12)
MCH RBC QN AUTO: 32.1 PG (ref 26.6–33)
MCHC RBC AUTO-ENTMCNC: 33.8 G/DL (ref 31.5–35.7)
MCV RBC AUTO: 95 FL (ref 79–97)
MONOCYTES # BLD: 2.61 10*3/MM3 (ref 0.1–0.9)
NEUTROPHILS # BLD AUTO: 5.88 10*3/MM3 (ref 1.7–7)
NEUTROPHILS NFR BLD MANUAL: 9 % (ref 42.7–76)
NITRITE UR QL STRIP: NEGATIVE
NRBC SPEC MANUAL: 0 /100 WBC (ref 0–0.2)
PH UR STRIP.AUTO: 6.5 [PH] (ref 5–8)
PLAT MORPH BLD: NORMAL
PLATELET # BLD AUTO: 148 10*3/MM3 (ref 140–450)
PMV BLD AUTO: 10.6 FL (ref 6–12)
POTASSIUM SERPL-SCNC: 5.1 MMOL/L (ref 3.5–5.2)
PROT UR QL STRIP: NEGATIVE
RBC # BLD AUTO: 4.02 10*6/MM3 (ref 4.14–5.8)
RBC # UR STRIP: NORMAL /HPF
RBC MORPH BLD: NORMAL
REF LAB TEST METHOD: NORMAL
SODIUM SERPL-SCNC: 135 MMOL/L (ref 136–145)
SP GR UR STRIP: 1.01 (ref 1–1.03)
SQUAMOUS #/AREA URNS HPF: NORMAL /HPF
UROBILINOGEN UR QL STRIP: NORMAL
VARIANT LYMPHS NFR BLD MANUAL: 3 % (ref 0–5)
VARIANT LYMPHS NFR BLD MANUAL: 82 % (ref 19.6–45.3)
WBC # UR STRIP: NORMAL /HPF
WBC MORPH BLD: NORMAL
WBC NRBC COR # BLD: 65.28 10*3/MM3 (ref 3.4–10.8)

## 2022-02-16 PROCEDURE — 85025 COMPLETE CBC W/AUTO DIFF WBC: CPT

## 2022-02-16 PROCEDURE — 36415 COLL VENOUS BLD VENIPUNCTURE: CPT

## 2022-02-16 PROCEDURE — 81001 URINALYSIS AUTO W/SCOPE: CPT

## 2022-02-16 PROCEDURE — 80048 BASIC METABOLIC PNL TOTAL CA: CPT

## 2022-02-16 PROCEDURE — 85007 BL SMEAR W/DIFF WBC COUNT: CPT

## 2022-02-16 NOTE — TELEPHONE ENCOUNTER
Name of Physician notified: Huma Somers, APRN    Time Physician Notified: [id number]      []  Orders received    []  Protocol/Standing orders followed    [x]  No new orders

## 2022-02-16 NOTE — TELEPHONE ENCOUNTER
----- Message from Jamia Escoto RN sent at 2/16/2022  1:16 PM EST -----      Critical Test Results      MD: Lizbet/Nicky    Date: 2-16-22     Critical test result:  WBC 65.28  ANC 5.76    Time results received:1:17

## 2022-03-18 ENCOUNTER — LAB (OUTPATIENT)
Dept: LAB | Facility: HOSPITAL | Age: 62
End: 2022-03-18

## 2022-03-18 ENCOUNTER — TELEPHONE (OUTPATIENT)
Dept: ONCOLOGY | Facility: CLINIC | Age: 62
End: 2022-03-18

## 2022-03-18 ENCOUNTER — DOCUMENTATION (OUTPATIENT)
Dept: ONCOLOGY | Facility: CLINIC | Age: 62
End: 2022-03-18

## 2022-03-18 DIAGNOSIS — C91.10 CLL (CHRONIC LYMPHOCYTIC LEUKEMIA): ICD-10-CM

## 2022-03-18 PROCEDURE — 36415 COLL VENOUS BLD VENIPUNCTURE: CPT

## 2022-03-18 PROCEDURE — 85025 COMPLETE CBC W/AUTO DIFF WBC: CPT

## 2022-03-18 PROCEDURE — 85060 BLOOD SMEAR INTERPRETATION: CPT

## 2022-03-18 PROCEDURE — 85007 BL SMEAR W/DIFF WBC COUNT: CPT

## 2022-03-18 NOTE — TELEPHONE ENCOUNTER
----- Message from Maddy Agudelo RN sent at 3/18/2022  3:56 PM EDT -----      Critical Test Results      MD: Lizbet/Nicky    Date: 3/18/22     Critical test result:  WBC 53.93    Time results received:  3:57pm

## 2022-03-18 NOTE — TELEPHONE ENCOUNTER
Name of Physician notified: ROSA White    Time Physician Notified: 4:08 PM      []  Orders received    []  Protocol/Standing orders followed    [x]  No new orders

## 2022-03-19 LAB
BASOPHILS # BLD MANUAL: 0 10*3/MM3 (ref 0–0.2)
BASOPHILS NFR BLD MANUAL: 0 % (ref 0–1.5)
CYTOLOGIST CVX/VAG CYTO: NORMAL
DEPRECATED RDW RBC AUTO: 47.6 FL (ref 37–54)
EOSINOPHIL # BLD MANUAL: 0 10*3/MM3 (ref 0–0.4)
EOSINOPHIL NFR BLD MANUAL: 0 % (ref 0.3–6.2)
ERYTHROCYTE [DISTWIDTH] IN BLOOD BY AUTOMATED COUNT: 13.3 % (ref 12.3–15.4)
HCT VFR BLD AUTO: 38.4 % (ref 37.5–51)
HGB BLD-MCNC: 12.4 G/DL (ref 13–17.7)
LYMPHOCYTES # BLD MANUAL: 50.15 10*3/MM3 (ref 0.7–3.1)
LYMPHOCYTES NFR BLD MANUAL: 2 % (ref 5–12)
MCH RBC QN AUTO: 31.6 PG (ref 26.6–33)
MCHC RBC AUTO-ENTMCNC: 32.3 G/DL (ref 31.5–35.7)
MCV RBC AUTO: 98 FL (ref 79–97)
MONOCYTES # BLD: 1.08 10*3/MM3 (ref 0.1–0.9)
NEUTROPHILS # BLD AUTO: 2.7 10*3/MM3 (ref 1.7–7)
NEUTROPHILS NFR BLD MANUAL: 5 % (ref 42.7–76)
NRBC SPEC MANUAL: 0 /100 WBC (ref 0–0.2)
PATH INTERP BLD-IMP: NORMAL
PLAT MORPH BLD: NORMAL
PLATELET # BLD AUTO: 166 10*3/MM3 (ref 140–450)
PMV BLD AUTO: 10.1 FL (ref 6–12)
RBC # BLD AUTO: 3.92 10*6/MM3 (ref 4.14–5.8)
RBC MORPH BLD: NORMAL
VARIANT LYMPHS NFR BLD MANUAL: 93 % (ref 19.6–45.3)
WBC MORPH BLD: NORMAL
WBC NRBC COR # BLD: 53.93 10*3/MM3 (ref 3.4–10.8)

## 2022-03-21 ENCOUNTER — LAB (OUTPATIENT)
Dept: LAB | Facility: HOSPITAL | Age: 62
End: 2022-03-21

## 2022-03-21 ENCOUNTER — OFFICE VISIT (OUTPATIENT)
Dept: INTERNAL MEDICINE | Facility: CLINIC | Age: 62
End: 2022-03-21

## 2022-03-21 ENCOUNTER — OFFICE VISIT (OUTPATIENT)
Dept: ONCOLOGY | Facility: CLINIC | Age: 62
End: 2022-03-21

## 2022-03-21 VITALS
SYSTOLIC BLOOD PRESSURE: 130 MMHG | WEIGHT: 226.4 LBS | HEART RATE: 63 BPM | TEMPERATURE: 97.8 F | DIASTOLIC BLOOD PRESSURE: 88 MMHG | HEIGHT: 71 IN | BODY MASS INDEX: 31.69 KG/M2 | OXYGEN SATURATION: 98 %

## 2022-03-21 VITALS
WEIGHT: 227 LBS | OXYGEN SATURATION: 98 % | RESPIRATION RATE: 18 BRPM | TEMPERATURE: 97.8 F | BODY MASS INDEX: 31.78 KG/M2 | HEART RATE: 63 BPM | HEIGHT: 71 IN | DIASTOLIC BLOOD PRESSURE: 88 MMHG | SYSTOLIC BLOOD PRESSURE: 130 MMHG

## 2022-03-21 DIAGNOSIS — R73.03 PREDIABETES: Chronic | ICD-10-CM

## 2022-03-21 DIAGNOSIS — I10 ESSENTIAL HYPERTENSION: Chronic | ICD-10-CM

## 2022-03-21 DIAGNOSIS — C91.10 CLL (CHRONIC LYMPHOCYTIC LEUKEMIA): Primary | Chronic | ICD-10-CM

## 2022-03-21 DIAGNOSIS — R79.89 ELEVATED SERUM CREATININE: Primary | ICD-10-CM

## 2022-03-21 PROCEDURE — 99214 OFFICE O/P EST MOD 30 MIN: CPT | Performed by: INTERNAL MEDICINE

## 2022-03-21 PROCEDURE — 99214 OFFICE O/P EST MOD 30 MIN: CPT | Performed by: STUDENT IN AN ORGANIZED HEALTH CARE EDUCATION/TRAINING PROGRAM

## 2022-03-21 NOTE — PROGRESS NOTES
CHIEF COMPLAINT: Follow-up CLL on observation    Problem List:  Oncology/Hematology History Overview Note   1.  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  Tinnitus  3.  Fungal nail infection  4.  Osteoarthritis  5.  Sleep apnea on CPAP  6.  History of right inguinal herniorrhaphy  7.  Diverticulosis  8.  Hypertension    Hematology history timeline:  -3/8/2021 white count 39,560 with hemoglobin 14.1, platelets 138,000, 81% lymphocytes.  Peripheral smear shows smudge cells with atypical lymphocytes with enlarged nuclei with inconspicuous nucleoli.  An adequate volume for flow cytometry on initial blood draw.  C-reactive protein less than 0.3.  PSA normal 3.69 glucose 119 otherwise unremarkable CMP.      -3/19/2021 [facility] hematology oncology initial consultation: We will get peripheral blood flow cytometry flow cytometric hematolymphoid neoplasia assessment including ZAP70/CD38, cytogenetics, B-cell rearrangement, immunoglobulin variable heavy chain and p53 mutational analysis sequencing will be done, as well as snip MicroArray for CLL.  Absolute neutrophil count is normal at 4350 with absolute lymphocyte count 32,040.  Without frequent infections I will not check quantitative immunoglobulins.  CT scans are not necessary for diagnosis, surveillance, routine monitoring of treatment response, or progression.  CT scans may be warranted if symptoms of bulky disease or the assessment of risk for tumor lysis syndrome prior to initiation of venetoclax might be considered.  We will check lactate dehydrogenase, beta-2 microglobulin and uric acid.  Lymphocytosis itself is not an indication for treatment.  NCCN guideline indications for treatment include:  Fatigue severe  Night sweats  Unexplained weight loss  Fever without infection  Threatened endorgan function due to bulk  Spleen greater than 6 cm below costal margin  Lymph nodes  greater than 10 cm  Progressive nonhemolytic anemia hemoglobin less than 10 (hemolytic anemia treated with steroids)  Progressive nonimmune mediated destruction thrombocytopenia platelets less than 100,000 (ITP)  Steroid refractory cytopenias    If these thresholds are reached, the molecular mutational analysis I am doing will guide the specific therapy I recommend.  Presently he does not have any of those clinical thresholds.  He does have swollen MCP and PIP joints with brothers who have psoriatic arthritis and I will check his sedimentation rate, C-reactive protein, rheumatoid factor, and extractable nuclear antigens as well and ultimately may need rheumatological referral.  Rheumatologic diseases can cause some lymphocytosis including monoclonal lymphocytic process but this would be higher lymphocyte count than I would expect from such processes.    -3/19/2021 data:  White count 36,900, hemoglobin 14.2, platelets 154,000, absolute neutrophil count 7900, absolute lymphocyte count 26,500.  CMP glucose 112, bicarb 32, otherwise unremarkable with creatinine 1.19 and normal liver enzymes.  Magnesium 2.1  Hemolytic panel: MARIS negative.  .  Uric acid normal 6.8.  ELLIE negative.  Rheumatoid factor negative.  proBNP normal 6.1  Sedimentation rate less than 1 with C-reactive protein 0.38.  Beta-2 microglobulin 3.5 upper limit 2.2  Flow cytometry: Clonal CD5 positive B-cell population 70% of analyzed cells consistent with CLL/SLL  ZAP70 positive/CD 38+: Double positive considered unfavorable and tends to correlate with immunoglobulin variable heavy chain unmutated status.  Immunoglobulin variable heavy chain somatic hyper mutation analysis did not yield interpretable results.  Cancer cytogenetic analysis showed 47, XY, +12 in all 20 metaphases cells.  CLL FISH panel positive for trisomy 12 which is detected in 20% of B-cell CLL which is associated with an intermediate-poor prognosis  B-cell immunoglobulin heavy and  kappa light chain gene rearrangements detected corroborating of B-cell neoplasm.  T p53 mutation analysis negative by DNA sequencing.    -4/9/2021 [facility] medical oncology follow-up visit: I reviewed the above molecular data with the patient.  The ZAP70 and CD38 and trisomy 12 positivity put him at an intermediate to high risk of progression I will watch him fairly closely.  I will repeat his CBC and CMP and physical exam in 2 months.  Thresholds for therapy as outlined above.  Ultimately what we choose to treat at that point depends on the COVID-19 pandemic status as much as his disease markers but he has not p53 mutated so we have a broader range of options.    -6/10/2021 [facility] hematology follow-up visit: 6/4/2021 data reveals white count 43,850 with hemoglobin 12.8 platelets 147,000.  Absolute neutrophil count normal 4820.  No bulky adenopathy.  We will repeat labs again in 2 months.  Indications/thresholds for therapy for CLL as indicated above have not been reached.    -9/10/2021 [facility] medical oncology follow-up visit: White count today is 52,700.  Hemoglobin is 13.9 with platelets 196,000 and no bulky adenopathy, fevers, chills, unexplained weight loss, effusions, bed drenching night sweats.  Hence has not reached NCCN guideline indications for treatment as outlined above.  We will see him back in 3 months with monthly CBC in the interim.  He has not had doubling of his lymphocyte count in less than 6 months either.  Recommended Covid vaccination with booster.    -12/20/2021 [facility] Oncology clinic follow-up:  Continues to do well on surveillance, white count stable currently at 59,100, absolute lymphocytes 51.42, no anemia or thrombocytopenia.  He has no lymphadenopathy or other symptoms suggestive of disease progression.  We will continue with monthly CBC.  We will see him back in 3 months for follow-up.  If counts remain stable at that time we may be able to extend the time frame of checking his CBC out  a little bit.  Indications for treatment per NCCN guidelines are listed above note dated 3/19/2021.    -3/21/2022 [facility] medical oncology follow-up visit: Clinically he is doing well with no new somatic complaints.  His white count is actually lower over the last 3 months now 53,930 with a hemoglobin of 12.4 and platelets 166,000 with absolute neutrophil count quite adequate at 2700 and no frequent infections.  We will check his CBC in 3 months and again just prior to return in 6 months given stability of counts over time with no significant rise in white count and no triggers for need of treatment.     CLL (chronic lymphocytic leukemia) (Formerly [facility])   3/19/2021 Initial Diagnosis    CLL (chronic lymphocytic leukemia) (CMS/HCC)     4/9/2021 Cancer Staged    Staging form: Chronic Lymphocytic Leukemia / Small Lymphocytic Lymphoma, AJCC 8th Edition  - Clinical: Modified Mina Stage 0 (Modified Mina risk: Low, Lymphocytosis: Present, Adenopathy: Absent, Organomegaly: Absent, Anemia: Absent, Thrombocytopenia: Absent) - Signed by Levi Saunders MD on 4/9/2021         HISTORY OF PRESENT ILLNESS:  The patient is a 61 y.o. male, here for follow up on management of CLL on watchful waiting    Past Medical History:   Diagnosis Date   • Cataract     Bilateral   • Diverticulosis    • Essential hypertension 03/2021    3/21 started Lisinopril   • Lymphocytosis 03/08/2021    3/2021. WBC 39.56. Abs lymphocyte 38. Plt 138. Hgb 14.1. Referral to hematology. CLL?   • Nonrheumatic mitral valve regurgitation 03/03/2021    Systolic murmur 4/6 noted 3/2021. TTE EF 56 - 60%. Mild to moderate mitral valve regurgitation, eccentric-anteriorly directed   • Osteoarthritis of knee    • Prediabetes 03/08/2021    3/2021 A1c 6.49   • Presbycusis of both ears 04/09/2021   • Pseudopolyp of sigmoid colon without complication (Formerly [facility]) 04/09/2021   • Right retinal detachment     2/2022   • Sensorineural hearing loss (SNHL) of both ears 03/17/2021    3/2021 ENT. Also  "tinnitus. Recommended hearing aids   • Sleep apnea     On CPAP consistently   • Tubular adenoma of colon 03/03/2021 4/2021: Tubular adenoma of sigmoid. No high grade.   • Vitreous detachment of left eye     2/15/2022.     Past Surgical History:   Procedure Laterality Date   • COLONOSCOPY      4/8/21   • FINGER GANGLION CYST EXCISION Right     3rd finger in the 90s   • INGUINAL HERNIA REPAIR Right    • RETINAL DETACHMENT SURGERY      right eye  on 3/18/22       No Known Allergies    Family History and Social History reviewed and changed as necessary    REVIEW OF SYSTEM:   No frequent infections.  No bed drenching night sweats.  No somatic complaints.    PHYSICAL EXAM:  No palpable cervical axillary or inguinal adenopathy    Vitals:    03/21/22 1054   BP: 130/88   Pulse: 63   Resp: 18   Temp: 97.8 °F (36.6 °C)   SpO2: 98%   Weight: 103 kg (227 lb)   Height: 180.3 cm (71\")     Vitals:    03/21/22 1054   PainSc: 0-No pain  Comment: No new pain          ECOG score: 0           Vitals reviewed.    ECOG: (0) Fully Active - Able to Carry On All Pre-disease Performance Without Restriction    Lab Results   Component Value Date    HGB 12.4 (L) 03/18/2022    HCT 38.4 03/18/2022    MCV 98.0 (H) 03/18/2022     03/18/2022    WBC 53.93 (C) 03/18/2022    NEUTROABS 2.70 03/18/2022    LYMPHSABS 40.10 (H) 09/10/2021    MONOSABS 3.60 (H) 09/10/2021    EOSABS 0.00 03/18/2022    BASOSABS 0.00 03/18/2022       Lab Results   Component Value Date    GLUCOSE 98 02/16/2022    BUN 22 02/16/2022    CREATININE 1.11 02/16/2022     (L) 02/16/2022    K 5.1 02/16/2022    CL 99 02/16/2022    CO2 30.5 (H) 02/16/2022    CALCIUM 9.8 02/16/2022    PROTEINTOT 6.6 02/11/2022    ALBUMIN 4.20 02/11/2022    BILITOT 0.4 02/11/2022    ALKPHOS 44 02/11/2022    AST 9 02/11/2022    ALT 12 02/11/2022             ASSESSMENT & PLAN:  1.  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  Tinnitus  3.  Fungal nail infection  4.  Osteoarthritis  5.  Sleep apnea on CPAP  6.  History of right inguinal herniorrhaphy  7.  Diverticulosis  8.  Hypertension    Hematology history timeline:  -3/8/2021 white count 39,560 with hemoglobin 14.1, platelets 138,000, 81% lymphocytes.  Peripheral smear shows smudge cells with atypical lymphocytes with enlarged nuclei with inconspicuous nucleoli.  An adequate volume for flow cytometry on initial blood draw.  C-reactive protein less than 0.3.  PSA normal 3.69 glucose 119 otherwise unremarkable CMP.      -3/19/2021 [facility] hematology oncology initial consultation: We will get peripheral blood flow cytometry flow cytometric hematolymphoid neoplasia assessment including ZAP70/CD38, cytogenetics, B-cell rearrangement, immunoglobulin variable heavy chain and p53 mutational analysis sequencing will be done, as well as snip MicroArray for CLL.  Absolute neutrophil count is normal at 4350 with absolute lymphocyte count 32,040.  Without frequent infections I will not check quantitative immunoglobulins.  CT scans are not necessary for diagnosis, surveillance, routine monitoring of treatment response, or progression.  CT scans may be warranted if symptoms of bulky disease or the assessment of risk for tumor lysis syndrome prior to initiation of venetoclax might be considered.  We will check lactate dehydrogenase, beta-2 microglobulin and uric acid.  Lymphocytosis itself is not an indication for treatment.  NCCN guideline indications for treatment include:  Fatigue severe  Night sweats  Unexplained weight loss  Fever without infection  Threatened endorgan function due to bulk  Spleen greater than 6 cm below costal margin  Lymph nodes greater than 10 cm  Progressive nonhemolytic anemia hemoglobin less than 10 (hemolytic anemia treated with steroids)  Progressive nonimmune mediated destruction thrombocytopenia platelets less than 100,000 (ITP)  Steroid  refractory cytopenias    If these thresholds are reached, the molecular mutational analysis I am doing will guide the specific therapy I recommend.  Presently he does not have any of those clinical thresholds.  He does have swollen MCP and PIP joints with brothers who have psoriatic arthritis and I will check his sedimentation rate, C-reactive protein, rheumatoid factor, and extractable nuclear antigens as well and ultimately may need rheumatological referral.  Rheumatologic diseases can cause some lymphocytosis including monoclonal lymphocytic process but this would be higher lymphocyte count than I would expect from such processes.    -3/19/2021 data:  White count 36,900, hemoglobin 14.2, platelets 154,000, absolute neutrophil count 7900, absolute lymphocyte count 26,500.  CMP glucose 112, bicarb 32, otherwise unremarkable with creatinine 1.19 and normal liver enzymes.  Magnesium 2.1  Hemolytic panel: MARIS negative.  .  Uric acid normal 6.8.  ELLIE negative.  Rheumatoid factor negative.  proBNP normal 6.1  Sedimentation rate less than 1 with C-reactive protein 0.38.  Beta-2 microglobulin 3.5 upper limit 2.2  Flow cytometry: Clonal CD5 positive B-cell population 70% of analyzed cells consistent with CLL/SLL  ZAP70 positive/CD 38+: Double positive considered unfavorable and tends to correlate with immunoglobulin variable heavy chain unmutated status.  Immunoglobulin variable heavy chain somatic hyper mutation analysis did not yield interpretable results.  Cancer cytogenetic analysis showed 47, XY, +12 in all 20 metaphases cells.  CLL FISH panel positive for trisomy 12 which is detected in 20% of B-cell CLL which is associated with an intermediate-poor prognosis  B-cell immunoglobulin heavy and kappa light chain gene rearrangements detected corroborating of B-cell neoplasm.  T p53 mutation analysis negative by DNA sequencing.    -4/9/2021 [facility] [place] medical oncology follow-up visit: I reviewed the above molecular  data with the patient.  The ZAP70 and CD38 and trisomy 12 positivity put him at an intermediate to high risk of progression I will watch him fairly closely.  I will repeat his CBC and CMP and physical exam in 2 months.  Thresholds for therapy as outlined above.  Ultimately what we choose to treat at that point depends on the COVID-19 pandemic status as much as his disease markers but he has not p53 mutated so we have a broader range of options.    -6/10/2021 [facility] hematology follow-up visit: 6/4/2021 data reveals white count 43,850 with hemoglobin 12.8 platelets 147,000.  Absolute neutrophil count normal 4820.  No bulky adenopathy.  We will repeat labs again in 2 months.  Indications/thresholds for therapy for CLL as indicated above have not been reached.    -9/10/2021 [facility] medical oncology follow-up visit: White count today is 52,700.  Hemoglobin is 13.9 with platelets 196,000 and no bulky adenopathy, fevers, chills, unexplained weight loss, effusions, bed drenching night sweats.  Hence has not reached NCCN guideline indications for treatment as outlined above.  We will see him back in 3 months with monthly CBC in the interim.  He has not had doubling of his lymphocyte count in less than 6 months either.  Recommended Covid vaccination with booster.    -12/20/2021 [facility] Oncology clinic follow-up:  Continues to do well on surveillance, white count stable currently at 59,100, absolute lymphocytes 51.42, no anemia or thrombocytopenia.  He has no lymphadenopathy or other symptoms suggestive of disease progression.  We will continue with monthly CBC.  We will see him back in 3 months for follow-up.  If counts remain stable at that time we may be able to extend the time frame of checking his CBC out a little bit.  Indications for treatment per NCCN guidelines are listed above note dated 3/19/2021.    1.  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  Tinnitus  3.  Fungal nail infection  4.  Osteoarthritis  5.  Sleep apnea on CPAP  6.  History of right inguinal herniorrhaphy  7.  Diverticulosis  8.  Hypertension    Hematology history timeline:  -3/8/2021 white count 39,560 with hemoglobin 14.1, platelets 138,000, 81% lymphocytes.  Peripheral smear shows smudge cells with atypical lymphocytes with enlarged nuclei with inconspicuous nucleoli.  An adequate volume for flow cytometry on initial blood draw.  C-reactive protein less than 0.3.  PSA normal 3.69 glucose 119 otherwise unremarkable CMP.      -3/19/2021 [facility] hematology oncology initial consultation: We will get peripheral blood flow cytometry flow cytometric hematolymphoid neoplasia assessment including ZAP70/CD38, cytogenetics, B-cell rearrangement, immunoglobulin variable heavy chain and p53 mutational analysis sequencing will be done, as well as snip MicroArray for CLL.  Absolute neutrophil count is normal at 4350 with absolute lymphocyte count 32,040.  Without frequent infections I will not check quantitative immunoglobulins.  CT scans are not necessary for diagnosis, surveillance, routine monitoring of treatment response, or progression.  CT scans may be warranted if symptoms of bulky disease or the assessment of risk for tumor lysis syndrome prior to initiation of venetoclax might be considered.  We will check lactate dehydrogenase, beta-2 microglobulin and uric acid.  Lymphocytosis itself is not an indication for treatment.  NCCN guideline indications for treatment include:  Fatigue severe  Night sweats  Unexplained weight loss  Fever without infection  Threatened endorgan function due to bulk  Spleen greater than 6 cm below costal margin  Lymph nodes greater than 10 cm  Progressive nonhemolytic anemia hemoglobin less than 10 (hemolytic anemia treated with steroids)  Progressive nonimmune mediated destruction thrombocytopenia platelets less than 100,000 (ITP)  Steroid  refractory cytopenias    If these thresholds are reached, the molecular mutational analysis I am doing will guide the specific therapy I recommend.  Presently he does not have any of those clinical thresholds.  He does have swollen MCP and PIP joints with brothers who have psoriatic arthritis and I will check his sedimentation rate, C-reactive protein, rheumatoid factor, and extractable nuclear antigens as well and ultimately may need rheumatological referral.  Rheumatologic diseases can cause some lymphocytosis including monoclonal lymphocytic process but this would be higher lymphocyte count than I would expect from such processes.    -3/19/2021 data:  White count 36,900, hemoglobin 14.2, platelets 154,000, absolute neutrophil count 7900, absolute lymphocyte count 26,500.  CMP glucose 112, bicarb 32, otherwise unremarkable with creatinine 1.19 and normal liver enzymes.  Magnesium 2.1  Hemolytic panel: MARIS negative.  .  Uric acid normal 6.8.  ELLIE negative.  Rheumatoid factor negative.  proBNP normal 6.1  Sedimentation rate less than 1 with C-reactive protein 0.38.  Beta-2 microglobulin 3.5 upper limit 2.2  Flow cytometry: Clonal CD5 positive B-cell population 70% of analyzed cells consistent with CLL/SLL  ZAP70 positive/CD 38+: Double positive considered unfavorable and tends to correlate with immunoglobulin variable heavy chain unmutated status.  Immunoglobulin variable heavy chain somatic hyper mutation analysis did not yield interpretable results.  Cancer cytogenetic analysis showed 47, XY, +12 in all 20 metaphases cells.  CLL FISH panel positive for trisomy 12 which is detected in 20% of B-cell CLL which is associated with an intermediate-poor prognosis  B-cell immunoglobulin heavy and kappa light chain gene rearrangements detected corroborating of B-cell neoplasm.  T p53 mutation analysis negative by DNA sequencing.    -4/9/2021 [facility] medical oncology follow-up visit: I reviewed the above molecular  data with the patient.  The ZAP70 and CD38 and trisomy 12 positivity put him at an intermediate to high risk of progression I will watch him fairly closely.  I will repeat his CBC and CMP and physical exam in 2 months.  Thresholds for therapy as outlined above.  Ultimately what we choose to treat at that point depends on the COVID-19 pandemic status as much as his disease markers but he has not p53 mutated so we have a broader range of options.    -6/10/2021 [facility] hematology follow-up visit: 6/4/2021 data reveals white count 43,850 with hemoglobin 12.8 platelets 147,000.  Absolute neutrophil count normal 4820.  No bulky adenopathy.  We will repeat labs again in 2 months.  Indications/thresholds for therapy for CLL as indicated above have not been reached.    -9/10/2021 [facility] medical oncology follow-up visit: White count today is 52,700.  Hemoglobin is 13.9 with platelets 196,000 and no bulky adenopathy, fevers, chills, unexplained weight loss, effusions, bed drenching night sweats.  Hence has not reached NCCN guideline indications for treatment as outlined above.  We will see him back in 3 months with monthly CBC in the interim.  He has not had doubling of his lymphocyte count in less than 6 months either.  Recommended Covid vaccination with booster.    -12/20/2021 [facility] Oncology clinic follow-up:  Continues to do well on surveillance, white count stable currently at 59,100, absolute lymphocytes 51.42, no anemia or thrombocytopenia.  He has no lymphadenopathy or other symptoms suggestive of disease progression.  We will continue with monthly CBC.  We will see him back in 3 months for follow-up.  If counts remain stable at that time we may be able to extend the time frame of checking his CBC out a little bit.  Indications for treatment per NCCN guidelines are listed above note dated 3/19/2021.      Total time of care today inclusive of time spent today prior to patient's arrival reviewing interval data as  outlined above and during visit putting forth plan as outlined above and after visit instituting this plan took 30 minutes of care throughout the day today.  Levi Saunders MD    03/21/2022

## 2022-03-21 NOTE — PROGRESS NOTES
"Chief Complaint  Sigifredo Walsh is a 61 y.o. male presenting for kidney labs (F/u).     Patient has a past medical history of prediabetes, diverticulosis, hypertension, obesity, osteoarthritis of the knee, KARRIE on CPAP and CLL (2021, f/u Dr. Saunders, heme-onc [facility]) and mitral valve regurgitation.    History of Present Illness  Patient is here for follow-up of her kidney labs.    Patient had unexpectedly elevated creatinine to 1.71 on 2/11/2022.  We did repeat 5 days later and it was back to normal.  He did not make any changes during those 5 days.  Did not notice any hematuria, no history of kidney stones.    However he was found to have a left vitreous detachment on 2/12/2022, and on the follow-up visit he was found to have right retinal detachment.    Patient does have follow-up visit this morning with heme-onc Dr. Saunders    The following portions of the patient's history were reviewed and updated as appropriate: allergies, current medications, past family history, past medical history, past social history, past surgical history and problem list.    Objective  /88 (BP Location: Left arm, Patient Position: Sitting, Cuff Size: Adult)   Pulse 63   Temp 97.8 °F (36.6 °C) (Temporal)   Ht 180.3 cm (70.98\")   Wt 103 kg (226 lb 6.4 oz)   SpO2 98%   BMI 31.59 kg/m²     Physical Exam  Vitals reviewed.   Constitutional:       Appearance: Normal appearance.   HENT:      Head: Normocephalic and atraumatic.      Nose: No congestion.   Eyes:      Extraocular Movements: Extraocular movements intact.      Conjunctiva/sclera: Conjunctivae normal.   Cardiovascular:      Rate and Rhythm: Normal rate and regular rhythm.      Heart sounds: Murmur heard.      Comments: Systolic murmur grade 3-4/6.  Pulmonary:      Effort: Pulmonary effort is normal.      Breath sounds: Normal breath sounds.   Musculoskeletal:      Cervical back: Neck supple.   Skin:     General: Skin is warm and dry.   Neurological:      Mental Status: He is " alert and oriented to person, place, and time. Mental status is at baseline.   Psychiatric:         Behavior: Behavior normal.         Thought Content: Thought content normal.         Assessment/Plan   1. Elevated serum creatinine  I initially was concerned he might be post renal obstruction with the sudden rise in creatinine, however the fact that the blood test was back to baseline just 5 days after makes me wonder if this was some form of error.  Be on the safe side I recommend a repeat of the BMP with his next blood draw today in the month.  - Basic Metabolic Panel; Future    2. Prediabetes  Hemoglobin A1C   Date Value Ref Range Status   02/11/2022 6.30 (H) 4.80 - 5.60 % Final   08/11/2021 5.94 (H) 4.80 - 5.60 % Final   03/08/2021 6.49 (H) 4.80 - 5.60 % Final   Patient is close to the diabetic line.  I have recommended follow-up in May to see if he continues to trend up.    3. Essential hypertension  BP Readings from Last 3 Encounters:   03/21/22 130/88   02/11/22 100/70   12/20/21 136/80   Blood pressure is currently at goal and fairly well controlled.  Continue on lisinopril 10 mg and chlorthalidone 25 mg.      Return for Next scheduled follow up.    Future Appointments       Provider Department Center    3/21/2022 11:15 AM Levi Saunders MD [facility] HEMATOLOGY & ONCOLOGY [facility]    5/11/2022 8:15 AM Jasper Zhao MD [facility] INTERNAL MEDICINE [facility]          Jasper Zhao MD  Family Medicine  03/21/2022

## 2022-03-24 DIAGNOSIS — I10 ESSENTIAL HYPERTENSION: ICD-10-CM

## 2022-03-24 RX ORDER — CHLORTHALIDONE 25 MG/1
TABLET ORAL
Qty: 30 TABLET | Refills: 0 | OUTPATIENT
Start: 2022-03-24

## 2022-03-24 NOTE — TELEPHONE ENCOUNTER
Rx Refill Note  Requested Prescriptions     Pending Prescriptions Disp Refills   • chlorthalidone (HYGROTON) 25 MG tablet [Pharmacy Med Name: Chlorthalidone 25 MG Oral Tablet] 30 tablet 0     Sig: Take 1 tablet by mouth once daily      Last office visit with prescribing clinician: 3/21/2022      Next office visit with prescribing clinician: 5/11/2022                Cinthia Ash LPN  03/24/22, 08:42 EDT

## 2022-04-21 ENCOUNTER — LAB (OUTPATIENT)
Dept: LAB | Facility: HOSPITAL | Age: 62
End: 2022-04-21

## 2022-04-21 DIAGNOSIS — R79.89 ELEVATED SERUM CREATININE: ICD-10-CM

## 2022-04-21 LAB
ANION GAP SERPL CALCULATED.3IONS-SCNC: 8.7 MMOL/L (ref 5–15)
BUN SERPL-MCNC: 15 MG/DL (ref 8–23)
BUN/CREAT SERPL: 14 (ref 7–25)
CALCIUM SPEC-SCNC: 9 MG/DL (ref 8.6–10.5)
CHLORIDE SERPL-SCNC: 106 MMOL/L (ref 98–107)
CO2 SERPL-SCNC: 25.3 MMOL/L (ref 22–29)
CREAT SERPL-MCNC: 1.07 MG/DL (ref 0.76–1.27)
EGFRCR SERPLBLD CKD-EPI 2021: 78 ML/MIN/1.73
GLUCOSE SERPL-MCNC: 101 MG/DL (ref 65–99)
POTASSIUM SERPL-SCNC: 4.5 MMOL/L (ref 3.5–5.2)
SODIUM SERPL-SCNC: 140 MMOL/L (ref 136–145)

## 2022-04-21 PROCEDURE — 80048 BASIC METABOLIC PNL TOTAL CA: CPT

## 2022-05-11 ENCOUNTER — OFFICE VISIT (OUTPATIENT)
Dept: INTERNAL MEDICINE | Facility: CLINIC | Age: 62
End: 2022-05-11

## 2022-05-11 VITALS
HEART RATE: 72 BPM | WEIGHT: 226.8 LBS | SYSTOLIC BLOOD PRESSURE: 110 MMHG | HEIGHT: 71 IN | BODY MASS INDEX: 31.75 KG/M2 | TEMPERATURE: 97.8 F | DIASTOLIC BLOOD PRESSURE: 74 MMHG

## 2022-05-11 DIAGNOSIS — Z13.220 LIPID SCREENING: ICD-10-CM

## 2022-05-11 DIAGNOSIS — C91.10 CLL (CHRONIC LYMPHOCYTIC LEUKEMIA): Chronic | ICD-10-CM

## 2022-05-11 DIAGNOSIS — Z23 NEED FOR PNEUMOCOCCAL VACCINE: ICD-10-CM

## 2022-05-11 DIAGNOSIS — Z00.00 WELL ADULT EXAM: Primary | ICD-10-CM

## 2022-05-11 DIAGNOSIS — Z13.21 ENCOUNTER FOR VITAMIN DEFICIENCY SCREENING: ICD-10-CM

## 2022-05-11 DIAGNOSIS — E66.9 OBESITY (BMI 30.0-34.9): ICD-10-CM

## 2022-05-11 DIAGNOSIS — I10 ESSENTIAL HYPERTENSION: Chronic | ICD-10-CM

## 2022-05-11 DIAGNOSIS — R73.03 PREDIABETES: Chronic | ICD-10-CM

## 2022-05-11 LAB
EXPIRATION DATE: NORMAL
HBA1C MFR BLD: 6.1 %
Lab: NORMAL

## 2022-05-11 PROCEDURE — 99396 PREV VISIT EST AGE 40-64: CPT | Performed by: STUDENT IN AN ORGANIZED HEALTH CARE EDUCATION/TRAINING PROGRAM

## 2022-05-11 PROCEDURE — 83036 HEMOGLOBIN GLYCOSYLATED A1C: CPT | Performed by: STUDENT IN AN ORGANIZED HEALTH CARE EDUCATION/TRAINING PROGRAM

## 2022-05-11 PROCEDURE — 90471 IMMUNIZATION ADMIN: CPT | Performed by: STUDENT IN AN ORGANIZED HEALTH CARE EDUCATION/TRAINING PROGRAM

## 2022-05-11 PROCEDURE — 90677 PCV20 VACCINE IM: CPT | Performed by: STUDENT IN AN ORGANIZED HEALTH CARE EDUCATION/TRAINING PROGRAM

## 2022-05-11 NOTE — PROGRESS NOTES
"Chief Complaint  Sigifredo Walsh is a 61 y.o. male presenting for Annual Exam (Fasting/).     Patient has a past medical history of prediabetes, diverticulosis, hypertension, obesity, osteoarthritis of the knee, KARRIE on CPAP and CLL (2021, f/u Dr. Saundres, heme-onc [facility]) and mitral valve regurgitation.    History of Present Illness  Patient is here for annual exam.    He is overall doing well, he is finally physically back at his workplace, he has been working from home during the pandemic.    He is following with [facility]o-onc at [facility] for his CLL, his next appointment is in September.  So far observed without chemo.    The following portions of the patient's history were reviewed and updated as appropriate: allergies, current medications, past family history, past medical history, past social history, past surgical history and problem list.    Objective  /74 (BP Location: Left arm, Patient Position: Sitting, Cuff Size: Adult)   Pulse 72   Temp 97.8 °F (36.6 °C) (Temporal)   Ht 179.1 cm (70.5\")   Wt 103 kg (226 lb 12.8 oz)   BMI 32.08 kg/m²     Physical Exam  Vitals reviewed.   Constitutional:       Appearance: Normal appearance.   HENT:      Head: Normocephalic and atraumatic.      Nose: No congestion.   Eyes:      Extraocular Movements: Extraocular movements intact.      Conjunctiva/sclera: Conjunctivae normal.   Cardiovascular:      Rate and Rhythm: Normal rate and regular rhythm.      Heart sounds: Murmur heard.      Comments: Gr 4-6  Pulmonary:      Effort: Pulmonary effort is normal.      Breath sounds: Normal breath sounds.   Abdominal:      General: There is no distension.      Palpations: Abdomen is soft. There is no mass.      Tenderness: There is no abdominal tenderness.   Musculoskeletal:      Cervical back: Neck supple.      Right lower leg: Edema present.      Left lower leg: Edema present.      Comments: Mild bilateral leg edema   Lymphadenopathy:      Cervical: No cervical adenopathy.   Skin:     " General: Skin is warm and dry.   Neurological:      Mental Status: He is alert and oriented to person, place, and time. Mental status is at baseline.   Psychiatric:         Behavior: Behavior normal.         Thought Content: Thought content normal.         Assessment/Plan   1. Well adult exam    2. Essential hypertension  BP Readings from Last 3 Encounters:   05/11/22 110/74   03/21/22 130/88   03/21/22 130/88   Blood pressure: Currently well controlled.  Continue on lisinopril 10 mg and chlorthalidone 25 mg.    3. Prediabetes  Hemoglobin A1C   Date Value Ref Range Status   05/11/2022 6.1 % Final   02/11/2022 6.30 (H) 4.80 - 5.60 % Final   08/11/2021 5.94 (H) 4.80 - 5.60 % Final   03/08/2021 6.49 (H) 4.80 - 5.60 % Final   Still in the prediabetic range.  A1c has improved.  We have discussed treatment with metformin for prediabetic patients, patient wants to think about it, for now focus on lifestyle changes.  - POC Glycosylated Hemoglobin (Hb A1C)    4. CLL (chronic lymphocytic leukemia) (HCC)  Overall fairly stable and doing well.    5. Encounter for vitamin deficiency screening  Patient was on vitamin B12 supplement for short while, he is not aware that his levels were checked in the past.  We will check today.  - Vitamin B12; Future    6. Lipid screening  Patient is fasting for lipids today  - Lipid Panel; Future    7. Need for pneumococcal vaccine  Vaccination completed by today's dose.  Discussed importance of having pneumococcus vaccines completed prior to potential chemotherapy in the future.  - Pneumococcal Conjugate Vaccine 20-Valent All    8. Obesity (BMI 30.0-34.9)  BMI is >= 30 and <= 34.9 (Class 1 obesity). The following options were offered after discussion: exercise counseling/recommendations and nutrition counseling/recommendations    Return in about 6 months (around 11/11/2022) for Recheck - A1c.    Future Appointments       Provider Department Center    9/30/2022 1:00 PM Hmua Somers APRN  [facility] HEMATOLOGY & ONCOLOGY [facility]    11/11/2022 9:00 AM Jasper Zhao MD [facility] INTERNAL MEDICINE [facility]          Jasper Zhao MD  Family Medicine  05/11/2022

## 2022-09-02 DIAGNOSIS — I10 ESSENTIAL HYPERTENSION: ICD-10-CM

## 2022-09-02 RX ORDER — LISINOPRIL 10 MG/1
TABLET ORAL
Qty: 90 TABLET | Refills: 0 | Status: SHIPPED | OUTPATIENT
Start: 2022-09-02 | End: 2023-01-04

## 2022-09-22 ENCOUNTER — LAB (OUTPATIENT)
Dept: LAB | Facility: HOSPITAL | Age: 62
End: 2022-09-22

## 2022-09-22 ENCOUNTER — TELEPHONE (OUTPATIENT)
Dept: ONCOLOGY | Facility: CLINIC | Age: 62
End: 2022-09-22

## 2022-09-22 DIAGNOSIS — Z13.21 ENCOUNTER FOR VITAMIN DEFICIENCY SCREENING: ICD-10-CM

## 2022-09-22 DIAGNOSIS — C91.10 CLL (CHRONIC LYMPHOCYTIC LEUKEMIA): ICD-10-CM

## 2022-09-22 DIAGNOSIS — Z13.220 LIPID SCREENING: ICD-10-CM

## 2022-09-22 LAB
CHOLEST SERPL-MCNC: 101 MG/DL (ref 0–200)
DEPRECATED RDW RBC AUTO: 47.9 FL (ref 37–54)
ERYTHROCYTE [DISTWIDTH] IN BLOOD BY AUTOMATED COUNT: 13.5 % (ref 12.3–15.4)
HCT VFR BLD AUTO: 37.2 % (ref 37.5–51)
HDLC SERPL-MCNC: 27 MG/DL (ref 40–60)
HGB BLD-MCNC: 12.5 G/DL (ref 13–17.7)
LDLC SERPL CALC-MCNC: 55 MG/DL (ref 0–100)
LDLC/HDLC SERPL: 2.04 {RATIO}
MCH RBC QN AUTO: 32.5 PG (ref 26.6–33)
MCHC RBC AUTO-ENTMCNC: 33.6 G/DL (ref 31.5–35.7)
MCV RBC AUTO: 96.6 FL (ref 79–97)
PLATELET # BLD AUTO: 149 10*3/MM3 (ref 140–450)
PMV BLD AUTO: 10.2 FL (ref 6–12)
RBC # BLD AUTO: 3.85 10*6/MM3 (ref 4.14–5.8)
TRIGL SERPL-MCNC: 95 MG/DL (ref 0–150)
VIT B12 BLD-MCNC: 468 PG/ML (ref 211–946)
VLDLC SERPL-MCNC: 19 MG/DL (ref 5–40)
WBC NRBC COR # BLD: 62.59 10*3/MM3 (ref 3.4–10.8)

## 2022-09-22 PROCEDURE — 80061 LIPID PANEL: CPT

## 2022-09-22 PROCEDURE — 85025 COMPLETE CBC W/AUTO DIFF WBC: CPT

## 2022-09-22 PROCEDURE — 82607 VITAMIN B-12: CPT

## 2022-09-22 PROCEDURE — 85007 BL SMEAR W/DIFF WBC COUNT: CPT

## 2022-09-22 PROCEDURE — 85060 BLOOD SMEAR INTERPRETATION: CPT

## 2022-09-22 PROCEDURE — 36415 COLL VENOUS BLD VENIPUNCTURE: CPT

## 2022-09-22 NOTE — TELEPHONE ENCOUNTER
Critical Test Results      MD: Levi Saunders MD    Date: 9/22/22     Critical test result:WBC 62.59    Time results received:12:29

## 2022-09-22 NOTE — TELEPHONE ENCOUNTER
Name of Physician notified: Levi Saunders MD     Time Physician Notified: [id number]       []  Orders received    []  Protocol/Standing orders followed    [x]  No new orders

## 2022-09-23 LAB
BASOPHILS # BLD MANUAL: 0 10*3/MM3 (ref 0–0.2)
BASOPHILS NFR BLD MANUAL: 0 % (ref 0–1.5)
CYTOLOGIST CVX/VAG CYTO: NORMAL
EOSINOPHIL # BLD MANUAL: 0 10*3/MM3 (ref 0–0.4)
EOSINOPHIL NFR BLD MANUAL: 0 % (ref 0.3–6.2)
LYMPHOCYTES # BLD MANUAL: 56.33 10*3/MM3 (ref 0.7–3.1)
LYMPHOCYTES NFR BLD MANUAL: 3 % (ref 5–12)
MONOCYTES # BLD: 1.88 10*3/MM3 (ref 0.1–0.9)
NEUTROPHILS # BLD AUTO: 1.88 10*3/MM3 (ref 1.7–7)
NEUTROPHILS NFR BLD MANUAL: 3 % (ref 42.7–76)
NRBC SPEC MANUAL: 0 /100 WBC (ref 0–0.2)
PATH INTERP BLD-IMP: NORMAL
PLAT MORPH BLD: NORMAL
PROLYMPHOCYTES NFR BLD MANUAL: 4 % (ref 0–0)
RBC MORPH BLD: NORMAL
SMUDGE CELLS BLD QL SMEAR: ABNORMAL
VARIANT LYMPHS NFR BLD MANUAL: 1 % (ref 0–5)
VARIANT LYMPHS NFR BLD MANUAL: 89 % (ref 19.6–45.3)

## 2022-09-30 ENCOUNTER — OFFICE VISIT (OUTPATIENT)
Dept: ONCOLOGY | Facility: CLINIC | Age: 62
End: 2022-09-30

## 2022-09-30 VITALS
BODY MASS INDEX: 32.76 KG/M2 | DIASTOLIC BLOOD PRESSURE: 74 MMHG | SYSTOLIC BLOOD PRESSURE: 141 MMHG | HEIGHT: 71 IN | TEMPERATURE: 97.6 F | HEART RATE: 80 BPM | OXYGEN SATURATION: 97 % | RESPIRATION RATE: 18 BRPM | WEIGHT: 234 LBS

## 2022-09-30 DIAGNOSIS — C91.10 CLL (CHRONIC LYMPHOCYTIC LEUKEMIA): Primary | Chronic | ICD-10-CM

## 2022-09-30 PROCEDURE — 99213 OFFICE O/P EST LOW 20 MIN: CPT | Performed by: NURSE PRACTITIONER

## 2022-09-30 NOTE — PROGRESS NOTES
CHIEF COMPLAINT: Follow-up CLL on observation    Problem List:  Oncology/Hematology History Overview Note   1.  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  Tinnitus  3.  Fungal nail infection  4.  Osteoarthritis  5.  Sleep apnea on CPAP  6.  History of right inguinal herniorrhaphy  7.  Diverticulosis  8.  Hypertension    Hematology history timeline:  -3/8/2021 white count 39,560 with hemoglobin 14.1, platelets 138,000, 81% lymphocytes.  Peripheral smear shows smudge cells with atypical lymphocytes with enlarged nuclei with inconspicuous nucleoli.  An adequate volume for flow cytometry on initial blood draw.  C-reactive protein less than 0.3.  PSA normal 3.69 glucose 119 otherwise unremarkable CMP.      -3/19/2021 [facility] hematology oncology initial consultation: We will get peripheral blood flow cytometry flow cytometric hematolymphoid neoplasia assessment including ZAP70/CD38, cytogenetics, B-cell rearrangement, immunoglobulin variable heavy chain and p53 mutational analysis sequencing will be done, as well as snip MicroArray for CLL.  Absolute neutrophil count is normal at 4350 with absolute lymphocyte count 32,040.  Without frequent infections I will not check quantitative immunoglobulins.  CT scans are not necessary for diagnosis, surveillance, routine monitoring of treatment response, or progression.  CT scans may be warranted if symptoms of bulky disease or the assessment of risk for tumor lysis syndrome prior to initiation of venetoclax might be considered.  We will check lactate dehydrogenase, beta-2 microglobulin and uric acid.  Lymphocytosis itself is not an indication for treatment.  NCCN guideline indications for treatment include:  Fatigue severe  Night sweats  Unexplained weight loss  Fever without infection  Threatened endorgan function due to bulk  Spleen greater than 6 cm below costal margin  Lymph nodes  greater than 10 cm  Progressive nonhemolytic anemia hemoglobin less than 10 (hemolytic anemia treated with steroids)  Progressive nonimmune mediated destruction thrombocytopenia platelets less than 100,000 (ITP)  Steroid refractory cytopenias    If these thresholds are reached, the molecular mutational analysis I am doing will guide the specific therapy I recommend.  Presently he does not have any of those clinical thresholds.  He does have swollen MCP and PIP joints with brothers who have psoriatic arthritis and I will check his sedimentation rate, C-reactive protein, rheumatoid factor, and extractable nuclear antigens as well and ultimately may need rheumatological referral.  Rheumatologic diseases can cause some lymphocytosis including monoclonal lymphocytic process but this would be higher lymphocyte count than I would expect from such processes.    -3/19/2021 data:  White count 36,900, hemoglobin 14.2, platelets 154,000, absolute neutrophil count 7900, absolute lymphocyte count 26,500.  CMP glucose 112, bicarb 32, otherwise unremarkable with creatinine 1.19 and normal liver enzymes.  Magnesium 2.1  Hemolytic panel: MARIS negative.  .  Uric acid normal 6.8.  ELLIE negative.  Rheumatoid factor negative.  proBNP normal 6.1  Sedimentation rate less than 1 with C-reactive protein 0.38.  Beta-2 microglobulin 3.5 upper limit 2.2  Flow cytometry: Clonal CD5 positive B-cell population 70% of analyzed cells consistent with CLL/SLL  ZAP70 positive/CD 38+: Double positive considered unfavorable and tends to correlate with immunoglobulin variable heavy chain unmutated status.  Immunoglobulin variable heavy chain somatic hyper mutation analysis did not yield interpretable results.  Cancer cytogenetic analysis showed 47, XY, +12 in all 20 metaphases cells.  CLL FISH panel positive for trisomy 12 which is detected in 20% of B-cell CLL which is associated with an intermediate-poor prognosis  B-cell immunoglobulin heavy and  kappa light chain gene rearrangements detected corroborating of B-cell neoplasm.  T p53 mutation analysis negative by DNA sequencing.    -4/9/2021 [facility] medical oncology follow-up visit: I reviewed the above molecular data with the patient.  The ZAP70 and CD38 and trisomy 12 positivity put him at an intermediate to high risk of progression I will watch him fairly closely.  I will repeat his CBC and CMP and physical exam in 2 months.  Thresholds for therapy as outlined above.  Ultimately what we choose to treat at that point depends on the COVID-19 pandemic status as much as his disease markers but he has not p53 mutated so we have a broader range of options.    -6/10/2021 [facility] hematology follow-up visit: 6/4/2021 data reveals white count 43,850 with hemoglobin 12.8 platelets 147,000.  Absolute neutrophil count normal 4820.  No bulky adenopathy.  We will repeat labs again in 2 months.  Indications/thresholds for therapy for CLL as indicated above have not been reached.    -9/10/2021 [facility] medical oncology follow-up visit: White count today is 52,700.  Hemoglobin is 13.9 with platelets 196,000 and no bulky adenopathy, fevers, chills, unexplained weight loss, effusions, bed drenching night sweats.  Hence has not reached NCCN guideline indications for treatment as outlined above.  We will see him back in 3 months with monthly CBC in the interim.  He has not had doubling of his lymphocyte count in less than 6 months either.  Recommended Covid vaccination with booster.    -12/20/2021 [facility] Oncology clinic follow-up:  Continues to do well on surveillance, white count stable currently at 59,100, absolute lymphocytes 51.42, no anemia or thrombocytopenia.  He has no lymphadenopathy or other symptoms suggestive of disease progression.  We will continue with monthly CBC.  We will see him back in 3 months for follow-up.  If counts remain stable at that time we may be able to extend the time frame of checking his CBC out  a little bit.  Indications for treatment per NCCN guidelines are listed above note dated 3/19/2021.    -3/21/2022 [facility] medical oncology follow-up visit: Clinically he is doing well with no new somatic complaints.  His white count is actually lower over the last 3 months now 53,930 with a hemoglobin of 12.4 and platelets 166,000 with absolute neutrophil count quite adequate at 2700 and no frequent infections.  We will check his CBC in 3 months and again just prior to return in 6 months given stability of counts over time with no significant rise in white count and no triggers for need of treatment.     CLL (chronic lymphocytic leukemia) ([facility])   3/19/2021 Initial Diagnosis    CLL (chronic lymphocytic leukemia) (CMS/HCC)     4/9/2021 Cancer Staged    Staging form: Chronic Lymphocytic Leukemia / Small Lymphocytic Lymphoma, AJCC 8th Edition  - Clinical: Modified Mina Stage 0 (Modified Mina risk: Low, Lymphocytosis: Present, Adenopathy: Absent, Organomegaly: Absent, Anemia: Absent, Thrombocytopenia: Absent) - Signed by Levi Saunders MD on 4/9/2021         HISTORY OF PRESENT ILLNESS:  The patient is a 62 y.o. male, here for follow up on management of CLL on watchful waiting.  Mr. Walsh has been doing well since we saw him last with no new concerns.  He denies any change in his health, no frequent illnesses, no hospitalizations.  His counts have been stable.  His appetite is normal, weight is stable.  No change in his bowel or bladder habits.  Denies any early satiety, no abdominal pain.  No lymphadenopathy that he is aware of.  No fevers, chills or bed drenching night sweats.    Past Medical History:   Diagnosis Date   • Cataract     Bilateral   • Diverticulosis    • Essential hypertension 03/2021    3/21 started Lisinopril   • Lymphocytosis 03/08/2021    3/2021. WBC 39.56. Abs lymphocyte 38. Plt 138. Hgb 14.1. Referral to hematology. CLL?   • Nonrheumatic mitral valve regurgitation 03/03/2021    Systolic murmur 4/6  "noted 3/2021. TTE EF 56 - 60%. Mild to moderate mitral valve regurgitation, eccentric-anteriorly directed   • Osteoarthritis of knee    • Prediabetes 03/08/2021    3/2021 A1c 6.49   • Presbycusis of both ears 04/09/2021   • Pseudopolyp of sigmoid colon without complication (HCC) 04/09/2021   • Right retinal detachment     2/2022   • Sensorineural hearing loss (SNHL) of both ears 03/17/2021    3/2021 ENT. Also tinnitus. Recommended hearing aids   • Sleep apnea     On CPAP consistently   • Tubular adenoma of colon 03/03/2021 4/2021: Tubular adenoma of sigmoid. No high grade.   • Vitreous detachment of left eye     2/15/2022.     Past Surgical History:   Procedure Laterality Date   • COLONOSCOPY      4/8/21   • FINGER GANGLION CYST EXCISION Right     3rd finger in the 90s   • INGUINAL HERNIA REPAIR Right    • RETINAL DETACHMENT SURGERY      right eye  on 3/18/22       No Known Allergies    Family History and Social History reviewed and changed as necessary    REVIEW OF SYSTEM:   No new somatic complaints.    PHYSICAL EXAM:  No palpable cervical, supraclavicular axillary or inguinal adenopathy  Abdomen is soft, nondistended, nontender, no organomegaly.    Vitals:    09/30/22 1257   BP: 141/74   Pulse: 80   Resp: 18   Temp: 97.6 °F (36.4 °C)   SpO2: 97%   Weight: 106 kg (234 lb)   Height: 180.3 cm (71\")     Vitals:    09/30/22 1257   PainSc: 0-No pain          ECOG score: 0           Vitals reviewed.  Labs reviewed    ECOG: (0) Fully Active - Able to Carry On All Pre-disease Performance Without Restriction    Lab Results   Component Value Date    HGB 12.5 (L) 09/22/2022    HCT 37.2 (L) 09/22/2022    MCV 96.6 09/22/2022     09/22/2022    WBC 62.59 (C) 09/22/2022    NEUTROABS 1.88 09/22/2022    LYMPHSABS 40.10 (H) 09/10/2021    MONOSABS 3.60 (H) 09/10/2021    EOSABS 0.00 09/22/2022    BASOSABS 0.00 09/22/2022       Lab Results   Component Value Date    GLUCOSE 101 (H) 04/21/2022    BUN 15 04/21/2022    CREATININE " 1.07 04/21/2022     04/21/2022    K 4.5 04/21/2022     04/21/2022    CO2 25.3 04/21/2022    CALCIUM 9.0 04/21/2022    PROTEINTOT 6.6 02/11/2022    ALBUMIN 4.20 02/11/2022    BILITOT 0.4 02/11/2022    ALKPHOS 44 02/11/2022    AST 9 02/11/2022    ALT 12 02/11/2022             ASSESSMENT & PLAN:  1.  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  Tinnitus  3.  Fungal nail infection  4.  Osteoarthritis  5.  Sleep apnea on CPAP  6.  History of right inguinal herniorrhaphy  7.  Diverticulosis  8.  Hypertension    Hematology history timeline:  -3/8/2021 white count 39,560 with hemoglobin 14.1, platelets 138,000, 81% lymphocytes.  Peripheral smear shows smudge cells with atypical lymphocytes with enlarged nuclei with inconspicuous nucleoli.  An adequate volume for flow cytometry on initial blood draw.  C-reactive protein less than 0.3.  PSA normal 3.69 glucose 119 otherwise unremarkable CMP.      -3/19/2021 [facility] hematology oncology initial consultation: We will get peripheral blood flow cytometry flow cytometric hematolymphoid neoplasia assessment including ZAP70/CD38, cytogenetics, B-cell rearrangement, immunoglobulin variable heavy chain and p53 mutational analysis sequencing will be done, as well as snip MicroArray for CLL.  Absolute neutrophil count is normal at 4350 with absolute lymphocyte count 32,040.  Without frequent infections I will not check quantitative immunoglobulins.  CT scans are not necessary for diagnosis, surveillance, routine monitoring of treatment response, or progression.  CT scans may be warranted if symptoms of bulky disease or the assessment of risk for tumor lysis syndrome prior to initiation of venetoclax might be considered.  We will check lactate dehydrogenase, beta-2 microglobulin and uric acid.  Lymphocytosis itself is not an indication for treatment.  NCCN guideline indications for  treatment include:  Fatigue severe  Night sweats  Unexplained weight loss  Fever without infection  Threatened endorgan function due to bulk  Spleen greater than 6 cm below costal margin  Lymph nodes greater than 10 cm  Progressive nonhemolytic anemia hemoglobin less than 10 (hemolytic anemia treated with steroids)  Progressive nonimmune mediated destruction thrombocytopenia platelets less than 100,000 (ITP)  Steroid refractory cytopenias    If these thresholds are reached, the molecular mutational analysis I am doing will guide the specific therapy I recommend.  Presently he does not have any of those clinical thresholds.  He does have swollen MCP and PIP joints with brothers who have psoriatic arthritis and I will check his sedimentation rate, C-reactive protein, rheumatoid factor, and extractable nuclear antigens as well and ultimately may need rheumatological referral.  Rheumatologic diseases can cause some lymphocytosis including monoclonal lymphocytic process but this would be higher lymphocyte count than I would expect from such processes.    -3/19/2021 data:  White count 36,900, hemoglobin 14.2, platelets 154,000, absolute neutrophil count 7900, absolute lymphocyte count 26,500.  CMP glucose 112, bicarb 32, otherwise unremarkable with creatinine 1.19 and normal liver enzymes.  Magnesium 2.1  Hemolytic panel: MARIS negative.  .  Uric acid normal 6.8.  ELLIE negative.  Rheumatoid factor negative.  proBNP normal 6.1  Sedimentation rate less than 1 with C-reactive protein 0.38.  Beta-2 microglobulin 3.5 upper limit 2.2  Flow cytometry: Clonal CD5 positive B-cell population 70% of analyzed cells consistent with CLL/SLL  ZAP70 positive/CD 38+: Double positive considered unfavorable and tends to correlate with immunoglobulin variable heavy chain unmutated status.  Immunoglobulin variable heavy chain somatic hyper mutation analysis did not yield interpretable results.  Cancer cytogenetic analysis showed 47, XY,  +12 in all 20 metaphases cells.  CLL FISH panel positive for trisomy 12 which is detected in 20% of B-cell CLL which is associated with an intermediate-poor prognosis  B-cell immunoglobulin heavy and kappa light chain gene rearrangements detected corroborating of B-cell neoplasm.  T p53 mutation analysis negative by DNA sequencing.    -4/9/2021 [facility] medical oncology follow-up visit: I reviewed the above molecular data with the patient.  The ZAP70 and CD38 and trisomy 12 positivity put him at an intermediate to high risk of progression I will watch him fairly closely.  I will repeat his CBC and CMP and physical exam in 2 months.  Thresholds for therapy as outlined above.  Ultimately what we choose to treat at that point depends on the COVID-19 pandemic status as much as his disease markers but he has not p53 mutated so we have a broader range of options.    -6/10/2021 [facility] hematology follow-up visit: 6/4/2021 data reveals white count 43,850 with hemoglobin 12.8 platelets 147,000.  Absolute neutrophil count normal 4820.  No bulky adenopathy.  We will repeat labs again in 2 months.  Indications/thresholds for therapy for CLL as indicated above have not been reached.    -9/10/2021 [facility] medical oncology follow-up visit: White count today is 52,700.  Hemoglobin is 13.9 with platelets 196,000 and no bulky adenopathy, fevers, chills, unexplained weight loss, effusions, bed drenching night sweats.  Hence has not reached NCCN guideline indications for treatment as outlined above.  We will see him back in 3 months with monthly CBC in the interim.  He has not had doubling of his lymphocyte count in less than 6 months either.  Recommended Covid vaccination with booster.    -12/20/2021 [facility] Oncology clinic follow-up:  Continues to do well on surveillance, white count stable currently at 59,100, absolute lymphocytes 51.42, no anemia or thrombocytopenia.  He has no lymphadenopathy or other symptoms suggestive of disease  progression.  We will continue with monthly CBC.  We will see him back in 3 months for follow-up.  If counts remain stable at that time we may be able to extend the time frame of checking his CBC out a little bit.  Indications for treatment per NCCN guidelines are listed above note dated 3/19/2021.    1.  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  Tinnitus  3.  Fungal nail infection  4.  Osteoarthritis  5.  Sleep apnea on CPAP  6.  History of right inguinal herniorrhaphy  7.  Diverticulosis  8.  Hypertension    Hematology history timeline:  -3/8/2021 white count 39,560 with hemoglobin 14.1, platelets 138,000, 81% lymphocytes.  Peripheral smear shows smudge cells with atypical lymphocytes with enlarged nuclei with inconspicuous nucleoli.  An adequate volume for flow cytometry on initial blood draw.  C-reactive protein less than 0.3.  PSA normal 3.69 glucose 119 otherwise unremarkable CMP.      -3/19/2021 [facility] hematology oncology initial consultation: We will get peripheral blood flow cytometry flow cytometric hematolymphoid neoplasia assessment including ZAP70/CD38, cytogenetics, B-cell rearrangement, immunoglobulin variable heavy chain and p53 mutational analysis sequencing will be done, as well as snip MicroArray for CLL.  Absolute neutrophil count is normal at 4350 with absolute lymphocyte count 32,040.  Without frequent infections I will not check quantitative immunoglobulins.  CT scans are not necessary for diagnosis, surveillance, routine monitoring of treatment response, or progression.  CT scans may be warranted if symptoms of bulky disease or the assessment of risk for tumor lysis syndrome prior to initiation of venetoclax might be considered.  We will check lactate dehydrogenase, beta-2 microglobulin and uric acid.  Lymphocytosis itself is not an indication for treatment.  NCCN guideline indications for treatment  include:  Fatigue severe  Night sweats  Unexplained weight loss  Fever without infection  Threatened endorgan function due to bulk  Spleen greater than 6 cm below costal margin  Lymph nodes greater than 10 cm  Progressive nonhemolytic anemia hemoglobin less than 10 (hemolytic anemia treated with steroids)  Progressive nonimmune mediated destruction thrombocytopenia platelets less than 100,000 (ITP)  Steroid refractory cytopenias    If these thresholds are reached, the molecular mutational analysis I am doing will guide the specific therapy I recommend.  Presently he does not have any of those clinical thresholds.  He does have swollen MCP and PIP joints with brothers who have psoriatic arthritis and I will check his sedimentation rate, C-reactive protein, rheumatoid factor, and extractable nuclear antigens as well and ultimately may need rheumatological referral.  Rheumatologic diseases can cause some lymphocytosis including monoclonal lymphocytic process but this would be higher lymphocyte count than I would expect from such processes.    -3/19/2021 data:  White count 36,900, hemoglobin 14.2, platelets 154,000, absolute neutrophil count 7900, absolute lymphocyte count 26,500.  CMP glucose 112, bicarb 32, otherwise unremarkable with creatinine 1.19 and normal liver enzymes.  Magnesium 2.1  Hemolytic panel: MARIS negative.  .  Uric acid normal 6.8.  ELLIE negative.  Rheumatoid factor negative.  proBNP normal 6.1  Sedimentation rate less than 1 with C-reactive protein 0.38.  Beta-2 microglobulin 3.5 upper limit 2.2  Flow cytometry: Clonal CD5 positive B-cell population 70% of analyzed cells consistent with CLL/SLL  ZAP70 positive/CD 38+: Double positive considered unfavorable and tends to correlate with immunoglobulin variable heavy chain unmutated status.  Immunoglobulin variable heavy chain somatic hyper mutation analysis did not yield interpretable results.  Cancer cytogenetic analysis showed 47, XY, +12 in all  20 metaphases cells.  CLL FISH panel positive for trisomy 12 which is detected in 20% of B-cell CLL which is associated with an intermediate-poor prognosis  B-cell immunoglobulin heavy and kappa light chain gene rearrangements detected corroborating of B-cell neoplasm.  T p53 mutation analysis negative by DNA sequencing.    -4/9/2021 [facility] medical oncology follow-up visit: I reviewed the above molecular data with the patient.  The ZAP70 and CD38 and trisomy 12 positivity put him at an intermediate to high risk of progression I will watch him fairly closely.  I will repeat his CBC and CMP and physical exam in 2 months.  Thresholds for therapy as outlined above.  Ultimately what we choose to treat at that point depends on the COVID-19 pandemic status as much as his disease markers but he has not p53 mutated so we have a broader range of options.    -6/10/2021 [facility] hematology follow-up visit: 6/4/2021 data reveals white count 43,850 with hemoglobin 12.8 platelets 147,000.  Absolute neutrophil count normal 4820.  No bulky adenopathy.  We will repeat labs again in 2 months.  Indications/thresholds for therapy for CLL as indicated above have not been reached.    -9/10/2021 [facility] medical oncology follow-up visit: White count today is 52,700.  Hemoglobin is 13.9 with platelets 196,000 and no bulky adenopathy, fevers, chills, unexplained weight loss, effusions, bed drenching night sweats.  Hence has not reached NCCN guideline indications for treatment as outlined above.  We will see him back in 3 months with monthly CBC in the interim.  He has not had doubling of his lymphocyte count in less than 6 months either.  Recommended Covid vaccination with booster.    -12/20/2021 [facility] Oncology clinic follow-up:  Continues to do well on surveillance, white count stable currently at 59,100, absolute lymphocytes 51.42, no anemia or thrombocytopenia.  He has no lymphadenopathy or other symptoms suggestive of disease  progression.  We will continue with monthly CBC.  We will see him back in 3 months for follow-up.  If counts remain stable at that time we may be able to extend the time frame of checking his CBC out a little bit.  Indications for treatment per NCCN guidelines are listed above note dated 3/19/2021.    -9/30/2022 [facility] Oncology/Hematology clinic follow-up: Mr. Walsh continues to do well, still no indication for treatment.  CBC is stable with WBC 62,590, mild anemia with hemoglobin of 12.5, hematocrit 37.2%, MCV normal at 96.6, platelet count normal at 149,000.  He has no new worrisome symptoms.  No lymphadenopathy.  We will continue with observation, we will repeat CBC every 3 months and I have ordered that today.  We will see him back in 6 months for follow-up.    I spent 20 minutes caring for Sigifredo on this date of service. This time includes time spent by me in the following activities: preparing for the visit, reviewing tests, ordering medications, tests, or procedures and documenting information in the medical record.     Huma Somers, APRN    09/30/2022

## 2022-11-11 ENCOUNTER — OFFICE VISIT (OUTPATIENT)
Dept: INTERNAL MEDICINE | Facility: CLINIC | Age: 62
End: 2022-11-11

## 2022-11-11 ENCOUNTER — LAB (OUTPATIENT)
Dept: LAB | Facility: HOSPITAL | Age: 62
End: 2022-11-11

## 2022-11-11 VITALS
HEIGHT: 71 IN | BODY MASS INDEX: 33.07 KG/M2 | TEMPERATURE: 98.4 F | DIASTOLIC BLOOD PRESSURE: 70 MMHG | HEART RATE: 60 BPM | WEIGHT: 236.2 LBS | SYSTOLIC BLOOD PRESSURE: 108 MMHG

## 2022-11-11 DIAGNOSIS — E11.9 TYPE 2 DIABETES MELLITUS WITHOUT COMPLICATION, WITHOUT LONG-TERM CURRENT USE OF INSULIN: Primary | Chronic | ICD-10-CM

## 2022-11-11 DIAGNOSIS — Z23 NEED FOR COVID-19 VACCINE: ICD-10-CM

## 2022-11-11 DIAGNOSIS — E66.9 OBESITY (BMI 30.0-34.9): ICD-10-CM

## 2022-11-11 DIAGNOSIS — E11.9 TYPE 2 DIABETES MELLITUS WITHOUT COMPLICATION, WITHOUT LONG-TERM CURRENT USE OF INSULIN: Chronic | ICD-10-CM

## 2022-11-11 DIAGNOSIS — C91.10 CLL (CHRONIC LYMPHOCYTIC LEUKEMIA): Chronic | ICD-10-CM

## 2022-11-11 DIAGNOSIS — I10 ESSENTIAL HYPERTENSION: Chronic | ICD-10-CM

## 2022-11-11 PROBLEM — R73.03 PREDIABETES: Chronic | Status: RESOLVED | Noted: 2021-03-08 | Resolved: 2022-11-11

## 2022-11-11 LAB
ALBUMIN SERPL-MCNC: 4.4 G/DL (ref 3.5–5.2)
ALBUMIN UR-MCNC: <1.2 MG/DL
ALBUMIN/GLOB SERPL: 2 G/DL
ALP SERPL-CCNC: 54 U/L (ref 39–117)
ALT SERPL W P-5'-P-CCNC: 21 U/L (ref 1–41)
ANION GAP SERPL CALCULATED.3IONS-SCNC: 9 MMOL/L (ref 5–15)
AST SERPL-CCNC: 21 U/L (ref 1–40)
BILIRUB SERPL-MCNC: 0.5 MG/DL (ref 0–1.2)
BUN SERPL-MCNC: 14 MG/DL (ref 8–23)
BUN/CREAT SERPL: 9.6 (ref 7–25)
CALCIUM SPEC-SCNC: 9.9 MG/DL (ref 8.6–10.5)
CHLORIDE SERPL-SCNC: 99 MMOL/L (ref 98–107)
CO2 SERPL-SCNC: 30 MMOL/L (ref 22–29)
CREAT SERPL-MCNC: 1.46 MG/DL (ref 0.76–1.27)
EGFRCR SERPLBLD CKD-EPI 2021: 53.4 ML/MIN/1.73
EXPIRATION DATE: NORMAL
GLOBULIN UR ELPH-MCNC: 2.2 GM/DL
GLUCOSE SERPL-MCNC: 119 MG/DL (ref 65–99)
HBA1C MFR BLD: 6.7 %
Lab: NORMAL
POTASSIUM SERPL-SCNC: 4.6 MMOL/L (ref 3.5–5.2)
PROT SERPL-MCNC: 6.6 G/DL (ref 6–8.5)
SODIUM SERPL-SCNC: 138 MMOL/L (ref 136–145)

## 2022-11-11 PROCEDURE — 80053 COMPREHEN METABOLIC PANEL: CPT

## 2022-11-11 PROCEDURE — 0124A COVID-19 (PFIZER) BIVALENT BOOSTER 12+YRS: CPT | Performed by: STUDENT IN AN ORGANIZED HEALTH CARE EDUCATION/TRAINING PROGRAM

## 2022-11-11 PROCEDURE — 83036 HEMOGLOBIN GLYCOSYLATED A1C: CPT | Performed by: STUDENT IN AN ORGANIZED HEALTH CARE EDUCATION/TRAINING PROGRAM

## 2022-11-11 PROCEDURE — 91312 COVID-19 (PFIZER) BIVALENT BOOSTER 12+YRS: CPT | Performed by: STUDENT IN AN ORGANIZED HEALTH CARE EDUCATION/TRAINING PROGRAM

## 2022-11-11 PROCEDURE — 82043 UR ALBUMIN QUANTITATIVE: CPT

## 2022-11-11 PROCEDURE — 99214 OFFICE O/P EST MOD 30 MIN: CPT | Performed by: STUDENT IN AN ORGANIZED HEALTH CARE EDUCATION/TRAINING PROGRAM

## 2022-11-11 NOTE — PROGRESS NOTES
"Chief Complaint  Sigifredo Walsh is a 62 y.o. male presenting for Prediabetes (F/u ).     Patient has a past medical history of T2DM (11/2022), diverticulosis, hypertension, obesity, osteoarthritis of the knee, KARRIE on CPAP and CLL (2021, f/u Dr. Saunders, [facility]-onc [facility]) and mitral valve regurgitation.    History of Present Illness  Patient is here for follow-up.    We did discuss metformin in the past for his prediabetes, but he wanted to hold off.  He has not been able to exercise much recently, but would like to do so.    The following portions of the patient's history were reviewed and updated as appropriate: allergies, current medications, past family history, past medical history, past social history, past surgical history and problem list.    Objective  /70 (BP Location: Left arm, Patient Position: Sitting, Cuff Size: Adult)   Pulse 60   Temp 98.4 °F (36.9 °C) (Temporal)   Ht 180.3 cm (70.98\")   Wt 107 kg (236 lb 3.2 oz)   BMI 32.96 kg/m²     Physical Exam  Vitals reviewed.   Constitutional:       Appearance: Normal appearance.   HENT:      Head: Normocephalic and atraumatic.      Nose: No congestion.   Eyes:      Extraocular Movements: Extraocular movements intact.      Conjunctiva/sclera: Conjunctivae normal.   Cardiovascular:      Rate and Rhythm: Normal rate and regular rhythm.      Pulses:           Dorsalis pedis pulses are 2+ on the right side and 2+ on the left side.        Posterior tibial pulses are 2+ on the right side and 2+ on the left side.      Heart sounds: Murmur heard.   Pulmonary:      Effort: Pulmonary effort is normal.      Breath sounds: Normal breath sounds.   Abdominal:      General: There is no distension.      Palpations: Abdomen is soft. There is no mass.      Tenderness: There is no abdominal tenderness.   Musculoskeletal:      Cervical back: Neck supple.      Right lower leg: Edema present.      Left lower leg: Edema present.      Right foot: Normal range of motion.      Left " foot: Normal range of motion.   Feet:      Right foot:      Protective Sensation: 10 sites tested. 10 sites sensed.      Skin integrity: Dry skin present. No ulcer or skin breakdown.      Toenail Condition: Right toenails are normal.      Left foot:      Protective Sensation: 10 sites tested. 10 sites sensed.      Skin integrity: Dry skin present. No ulcer or skin breakdown.      Toenail Condition: Left toenails are normal.      Comments: Diabetic Foot Exam Performed and Monofilament Test Performed  Some thick skin of both feet, no significant calluses.  Fingers are still mildly interested with fungus, but improving after treatment.  They are cut a little bit short.  Skin:     General: Skin is warm and dry.   Neurological:      Mental Status: He is alert and oriented to person, place, and time. Mental status is at baseline.   Psychiatric:         Behavior: Behavior normal.         Thought Content: Thought content normal.         Assessment/Plan   1. Type 2 diabetes mellitus without complication, without long-term current use of insulin (HCC)  Hemoglobin A1C   Date Value Ref Range Status   11/11/2022 6.7 % Final   05/11/2022 6.1 % Final   02/11/2022 6.30 (H) 4.80 - 5.60 % Final   08/11/2021 5.94 (H) 4.80 - 5.60 % Final   03/08/2021 6.49 (H) 4.80 - 5.60 % Final   Unfortunately patient now has a new diagnosis of type 2 diabetes.  He does have follow-up with ophthalmology and will see them in a couple of weeks.  I have asked that they send me records.  No evidence of polyneuropathy.  We will do urine test today to check for microalbuminuria.  Patient is ready to start metformin.  Discussed side effects including GI/diarrhea.  If any significant side effects I asked that he get back in touch.  Otherwise we will do a follow-up in 3 months.  - POC Glycosylated Hemoglobin (Hb A1C)  - Comprehensive Metabolic Panel; Future  - MicroAlbumin, Urine, Random - Urine, Clean Catch; Future  - metFORMIN (Glucophage) 500 MG tablet;  Take 1 tablet by mouth 2 (Two) Times a Day With Meals.  Dispense: 60 tablet; Refill: 4    2. Essential hypertension  BP Readings from Last 3 Encounters:   11/11/22 108/70   09/30/22 141/74   05/11/22 110/74   Blood pressure is at goal.  Continue on current medication      3. CLL (chronic lymphocytic leukemia) (HCC)  Overall stable, continue follow-up with heme-onc    4. Need for COVID-19 vaccine  Administered today  - COVID-19 Bivalent Booster (Pfizer) 12+yrs    5. Obesity (BMI 30.0-34.9)  BMI is >= 30 and <35. (Class 1 Obesity). The following options were offered after discussion;: exercise counseling/recommendations and nutrition counseling/recommendations    Return in about 3 months (around 2/11/2023) for Recheck.    Future Appointments       Provider Department Center    2/10/2023 11:00 AM Jasper Zhao MD [facility] INTERNAL MEDICINE [facility]    3/31/2023 3:45 PM Levi Saunders MD [facility] HEMATOLOGY & ONCOLOGY [place] [facility]    5/17/2023 9:45 AM Jasper Zhao MD [facility] MEDICINE [facility]            Jasper Zhao MD  Family Medicine  11/11/2022

## 2022-11-18 ENCOUNTER — DOCUMENTATION (OUTPATIENT)
Dept: INTERNAL MEDICINE | Facility: CLINIC | Age: 62
End: 2022-11-18

## 2022-12-20 DIAGNOSIS — I10 ESSENTIAL HYPERTENSION: ICD-10-CM

## 2022-12-20 RX ORDER — CHLORTHALIDONE 25 MG/1
25 TABLET ORAL DAILY
Qty: 90 TABLET | Refills: 1 | Status: SHIPPED | OUTPATIENT
Start: 2022-12-20 | End: 2023-03-03

## 2023-01-04 DIAGNOSIS — I10 ESSENTIAL HYPERTENSION: ICD-10-CM

## 2023-01-04 RX ORDER — LISINOPRIL 10 MG/1
TABLET ORAL
Qty: 90 TABLET | Refills: 0 | Status: SHIPPED | OUTPATIENT
Start: 2023-01-04

## 2023-01-06 ENCOUNTER — PATIENT MESSAGE (OUTPATIENT)
Dept: ONCOLOGY | Facility: CLINIC | Age: 63
End: 2023-01-06
Payer: COMMERCIAL

## 2023-01-06 ENCOUNTER — PATIENT MESSAGE (OUTPATIENT)
Dept: INTERNAL MEDICINE | Facility: CLINIC | Age: 63
End: 2023-01-06
Payer: COMMERCIAL

## 2023-01-06 DIAGNOSIS — H47.49 COMPRESSION OF OPTIC CHIASM: ICD-10-CM

## 2023-01-06 DIAGNOSIS — G93.89 SUPRASELLAR MASS: Primary | ICD-10-CM

## 2023-01-09 DIAGNOSIS — C91.10 CLL (CHRONIC LYMPHOCYTIC LEUKEMIA): Primary | Chronic | ICD-10-CM

## 2023-01-09 DIAGNOSIS — R90.89 ABNORMAL BRAIN MRI: ICD-10-CM

## 2023-01-09 PROBLEM — H47.49 COMPRESSION OF OPTIC CHIASM: Status: ACTIVE | Noted: 2023-01-09

## 2023-01-09 PROBLEM — G93.89 SUPRASELLAR MASS: Status: ACTIVE | Noted: 2023-01-09

## 2023-01-09 NOTE — TELEPHONE ENCOUNTER
From: Sigifredo Walsh  To: Jasper Keyesrea  Sent: 1/6/2023 11:09 PM EST  Subject: Sigifredo Walhs Brain Mri results    Dr Keyon Berry performed two cataract surgeries on me in 12/2022. My left eye after the surgery still had severe vision loss. Dr Megan Smith did a vision field test teaming with Dr Berry and that showed severe loss in the left eye. Their next step was to refer me for a Brain MRI the report is attached. I am being referred to Dr Donnie Christianson Neurosurgeon at . I also have an appt with Dr Amandeep Avelar ENT to address the sinus lesion, sinus issue mentioned on MRI and hearing follow up since my hearing in my left despite aids has been difficult at times. Dr Lynch was sent a copy of this report by Dr Smith to upload in [facility] Medical records. Please review the report and let me know if you want to run any tests or have me make an appt.

## 2023-01-10 NOTE — TELEPHONE ENCOUNTER
Called and spoke with patient and wife. They will complete blood work tomorrow. He sees Dr. Saunders on on the 23rd. Pet scan is scheduled on the 20th. He sees neurosurgery this Friday. ENT is scheduled for tomorrow. They have agreed to the endo referral and they would like [facility] at [place].

## 2023-01-11 ENCOUNTER — LAB (OUTPATIENT)
Dept: LAB | Facility: HOSPITAL | Age: 63
End: 2023-01-11
Payer: COMMERCIAL

## 2023-01-11 ENCOUNTER — TELEPHONE (OUTPATIENT)
Dept: ONCOLOGY | Facility: CLINIC | Age: 63
End: 2023-01-11
Payer: COMMERCIAL

## 2023-01-11 DIAGNOSIS — G93.89 SUPRASELLAR MASS: ICD-10-CM

## 2023-01-11 DIAGNOSIS — C91.10 CLL (CHRONIC LYMPHOCYTIC LEUKEMIA): ICD-10-CM

## 2023-01-11 PROBLEM — R79.89 ELEVATED PROLACTIN LEVEL: Status: ACTIVE | Noted: 2023-01-11

## 2023-01-11 LAB
BASOPHILS # BLD MANUAL: 0.68 10*3/MM3 (ref 0–0.2)
BASOPHILS NFR BLD MANUAL: 1 % (ref 0–1.5)
CORTIS SERPL-MCNC: 8.29 MCG/DL
DEPRECATED RDW RBC AUTO: 46.6 FL (ref 37–54)
EOSINOPHIL # BLD MANUAL: 0 10*3/MM3 (ref 0–0.4)
EOSINOPHIL NFR BLD MANUAL: 0 % (ref 0.3–6.2)
ERYTHROCYTE [DISTWIDTH] IN BLOOD BY AUTOMATED COUNT: 13.5 % (ref 12.3–15.4)
FSH SERPL-ACNC: 5.77 MIU/ML
HCT VFR BLD AUTO: 38.8 % (ref 37.5–51)
HGB BLD-MCNC: 13.4 G/DL (ref 13–17.7)
LH SERPL-ACNC: 5.23 MIU/ML
LYMPHOCYTES # BLD MANUAL: 55.07 10*3/MM3 (ref 0.7–3.1)
LYMPHOCYTES NFR BLD MANUAL: 3 % (ref 5–12)
MCH RBC QN AUTO: 32.8 PG (ref 26.6–33)
MCHC RBC AUTO-ENTMCNC: 34.5 G/DL (ref 31.5–35.7)
MCV RBC AUTO: 94.9 FL (ref 79–97)
MONOCYTES # BLD: 2.04 10*3/MM3 (ref 0.1–0.9)
NEUTROPHILS # BLD AUTO: 8.16 10*3/MM3 (ref 1.7–7)
NEUTROPHILS NFR BLD MANUAL: 12 % (ref 42.7–76)
PLAT MORPH BLD: NORMAL
PLATELET # BLD AUTO: 160 10*3/MM3 (ref 140–450)
PMV BLD AUTO: 10.4 FL (ref 6–12)
PROLACTIN SERPL-MCNC: 41.2 NG/ML (ref 4.04–15.2)
PROLYMPHOCYTES NFR BLD MANUAL: 3 % (ref 0–0)
RBC # BLD AUTO: 4.09 10*6/MM3 (ref 4.14–5.8)
RBC MORPH BLD: NORMAL
SMUDGE CELLS BLD QL SMEAR: ABNORMAL
TSH SERPL DL<=0.05 MIU/L-ACNC: 0.77 UIU/ML (ref 0.27–4.2)
VARIANT LYMPHS NFR BLD MANUAL: 37 % (ref 19.6–45.3)
VARIANT LYMPHS NFR BLD MANUAL: 44 % (ref 0–5)
WBC NRBC COR # BLD: 67.99 10*3/MM3 (ref 3.4–10.8)

## 2023-01-11 PROCEDURE — 84146 ASSAY OF PROLACTIN: CPT

## 2023-01-11 PROCEDURE — 36415 COLL VENOUS BLD VENIPUNCTURE: CPT

## 2023-01-11 PROCEDURE — 84443 ASSAY THYROID STIM HORMONE: CPT

## 2023-01-11 PROCEDURE — 83002 ASSAY OF GONADOTROPIN (LH): CPT

## 2023-01-11 PROCEDURE — 83001 ASSAY OF GONADOTROPIN (FSH): CPT

## 2023-01-11 PROCEDURE — 82533 TOTAL CORTISOL: CPT

## 2023-01-11 PROCEDURE — 85025 COMPLETE CBC W/AUTO DIFF WBC: CPT

## 2023-01-11 PROCEDURE — 85007 BL SMEAR W/DIFF WBC COUNT: CPT

## 2023-01-11 NOTE — TELEPHONE ENCOUNTER
Critical Test Results      MD: Levi Saunders MD    Date: 1/11/23     Critical test result: WBC 67.99    Time results received: 1200          Name of Physician notified: ROSA Alvarez     Time Physician Notified: 1201      []  Orders received    []  Protocol/Standing orders followed    [x]  No new orders

## 2023-01-13 ENCOUNTER — TELEPHONE (OUTPATIENT)
Dept: ENDOCRINOLOGY | Facility: CLINIC | Age: 63
End: 2023-01-13
Payer: COMMERCIAL

## 2023-01-13 ENCOUNTER — TELEPHONE (OUTPATIENT)
Dept: ONCOLOGY | Facility: CLINIC | Age: 63
End: 2023-01-13

## 2023-01-13 NOTE — TELEPHONE ENCOUNTER
Provider: DR CHU  Caller: MICK  Relationship to Patient: WIFE    Reason for Call: MICK IS CALLING STATES THAT MITZI IS HAVING HIS SURGERY ON 1-18 AT  TO REMOVE TUMOR    PLEASE ADVISE

## 2023-01-13 NOTE — TELEPHONE ENCOUNTER
PT'S WIFE CALLED STATING HE IS TO HAVE A PROCEDURE FOLLOWING HIS NEW PT APPT WITH US NEXT WEEK. SHE STATED SHE WOULD LIKE FOR HIM TO BE SEEN QUICKLY SO HE WILL NOT BE LATE TO HIS APPT FOLLOWING US.

## 2023-01-13 NOTE — TELEPHONE ENCOUNTER
Discussed with Dr. Saunders he is okay with seeing patient 2-4 weeks after surgery to discuss path results, he does not need to do PET scan. Called patients wife and she verbalized understanding.

## 2023-01-17 ENCOUNTER — OFFICE VISIT (OUTPATIENT)
Dept: ENDOCRINOLOGY | Facility: CLINIC | Age: 63
End: 2023-01-17
Payer: COMMERCIAL

## 2023-01-17 VITALS
HEART RATE: 73 BPM | HEIGHT: 71 IN | BODY MASS INDEX: 33.46 KG/M2 | WEIGHT: 239 LBS | SYSTOLIC BLOOD PRESSURE: 120 MMHG | OXYGEN SATURATION: 94 % | DIASTOLIC BLOOD PRESSURE: 80 MMHG

## 2023-01-17 DIAGNOSIS — D35.2 PITUITARY MACROADENOMA WITH EXTRASELLAR EXTENSION: Primary | ICD-10-CM

## 2023-01-17 LAB — CORTIS AM PEAK SERPL-MCNC: 5.24 MCG/DL

## 2023-01-17 PROCEDURE — 99205 OFFICE O/P NEW HI 60 MIN: CPT | Performed by: INTERNAL MEDICINE

## 2023-01-17 PROCEDURE — 82024 ASSAY OF ACTH: CPT | Performed by: INTERNAL MEDICINE

## 2023-01-17 PROCEDURE — 36415 COLL VENOUS BLD VENIPUNCTURE: CPT | Performed by: INTERNAL MEDICINE

## 2023-01-17 PROCEDURE — 84439 ASSAY OF FREE THYROXINE: CPT | Performed by: INTERNAL MEDICINE

## 2023-01-17 PROCEDURE — 84305 ASSAY OF SOMATOMEDIN: CPT | Performed by: INTERNAL MEDICINE

## 2023-01-17 PROCEDURE — 82533 TOTAL CORTISOL: CPT | Performed by: INTERNAL MEDICINE

## 2023-01-17 RX ORDER — HYDROCORTISONE 5 MG/1
TABLET ORAL
Qty: 90 TABLET | Refills: 5 | Status: SHIPPED | OUTPATIENT
Start: 2023-01-17 | End: 2023-02-05

## 2023-01-18 LAB
ACTH PLAS-MCNC: 59.8 PG/ML (ref 7.2–63.3)
T4 FREE SERPL-MCNC: 0.79 NG/DL (ref 0.93–1.7)

## 2023-01-20 LAB — IGF-I SERPL-MCNC: 77 NG/ML (ref 64–240)

## 2023-02-05 PROBLEM — R01.1 MURMUR, CARDIAC: Status: ACTIVE | Noted: 2023-01-18

## 2023-02-10 ENCOUNTER — TELEMEDICINE (OUTPATIENT)
Dept: INTERNAL MEDICINE | Facility: CLINIC | Age: 63
End: 2023-02-10
Payer: COMMERCIAL

## 2023-02-10 DIAGNOSIS — Z99.89 OSA ON CPAP: Chronic | ICD-10-CM

## 2023-02-10 DIAGNOSIS — E11.9 TYPE 2 DIABETES MELLITUS WITHOUT COMPLICATION, WITHOUT LONG-TERM CURRENT USE OF INSULIN: Chronic | ICD-10-CM

## 2023-02-10 DIAGNOSIS — D35.2 PITUITARY MACROADENOMA WITH EXTRASELLAR EXTENSION: Primary | ICD-10-CM

## 2023-02-10 DIAGNOSIS — G47.33 OSA ON CPAP: Chronic | ICD-10-CM

## 2023-02-10 PROCEDURE — 99214 OFFICE O/P EST MOD 30 MIN: CPT | Performed by: STUDENT IN AN ORGANIZED HEALTH CARE EDUCATION/TRAINING PROGRAM

## 2023-02-10 NOTE — PROGRESS NOTES
You have chosen to receive care through a telehealth visit.  Do you consent to use a video/audio connection for your medical care today? 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 Saunders, [facility]-onc [facility]) and mitral valve regurgitation.    No nasal swabs, tubes or testing placed in patient's nose.  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      Chief Complaint  Sigifredo Walsh is a 62 y.o. male who presents via video-conference for follow up after pituitary     Patient ID confirmed. Patient located in [place]. Physician located in [place].      History of Present Illness  Patient seen by video with wife present.     He is recovering well after his transsphenoidal pituitary resection on 1/18/23 at . He states his energy level is up to 85% of his baseline and he is back to working full time. He has close f/u with alissa Bell and they have her personal number for emergencies.     He has been holding metformin until today, but instructed to restart again today by endo. He also has been holding diuretic.    He tells me they plan to do repeat MRI at 8 weeks, and blood work planned for the end of the month.      Patient does state mild lightheadedness when standing occasionally, but no falls. They did not check BP with symptoms, but have monitor available.     He has resumed CPAP. He is also well aware of postoperative restrictions with lifting, straining, etc.    The following portions of the patient's history were reviewed and updated as appropriate: allergies, current medications, past family history, past medical history, past social history, past surgical history and problem list.    Review of Systems    Objective  Vital signs  available: No  Well appearing on video. Smiling. Speaking in full sentences. AAO x3.    Assessment/Plan   1. Pituitary macroadenoma with extrasellar extension (HCC)  Recovering well. Continue close follow up with endo and surgeon. I have placed precaution warning in my note, we are also working with IT to have this precaution posted to be easily visible in Epic    2. Type 2 diabetes mellitus without complication, without long-term current use of insulin (HCC)  Hemoglobin A1C   Date Value Ref Range Status   01/19/2023 6.4 (H) <5.7 % Final   11/11/2022 6.7 % Final   05/11/2022 6.1 % Final   02/11/2022 6.30 (H) 4.80 - 5.60 % Final   08/11/2021 5.94 (H) 4.80 - 5.60 % Final   03/08/2021 6.49 (H) 4.80 - 5.60 % Final   Overall well controlled. He will resume metformin as instructed by endo.    3. KARRIE on CPAP  Continue on CPAP.      Return if symptoms worsen or fail to improve, for Next scheduled follow up.    Future Appointments       Provider Department Center    2/24/2023 10:00 AM Levi Saunders MD [facility] HEMATOLOGY & ONCOLOGY [place] [facility]    2/27/2023 8:15 AM Ayde Bell MD [facility] ENDOCRINOLOGY [facility]    3/31/2023 3:45 PM Levi Saunders MD [facility] HEMATOLOGY & ONCOLOGY [place] [facility]    5/17/2023 9:45 AM Jasper Zhao MD [facility] INTERNAL MEDICINE [facility]            Jasper Zhao MD  Family Medicine  02/10/2023

## 2023-02-24 ENCOUNTER — LAB (OUTPATIENT)
Dept: LAB | Facility: HOSPITAL | Age: 63
End: 2023-02-24
Payer: COMMERCIAL

## 2023-02-24 ENCOUNTER — OFFICE VISIT (OUTPATIENT)
Dept: ONCOLOGY | Facility: CLINIC | Age: 63
End: 2023-02-24
Payer: COMMERCIAL

## 2023-02-24 VITALS
SYSTOLIC BLOOD PRESSURE: 119 MMHG | OXYGEN SATURATION: 96 % | HEART RATE: 74 BPM | WEIGHT: 238 LBS | TEMPERATURE: 97.9 F | DIASTOLIC BLOOD PRESSURE: 68 MMHG | HEIGHT: 71 IN | RESPIRATION RATE: 20 BRPM | BODY MASS INDEX: 33.32 KG/M2

## 2023-02-24 DIAGNOSIS — C91.10 CLL (CHRONIC LYMPHOCYTIC LEUKEMIA): ICD-10-CM

## 2023-02-24 DIAGNOSIS — C91.10 CLL (CHRONIC LYMPHOCYTIC LEUKEMIA): Primary | Chronic | ICD-10-CM

## 2023-02-24 LAB
BASOPHILS # BLD AUTO: 0.03 10*3/MM3 (ref 0–0.2)
BASOPHILS NFR BLD AUTO: 0 % (ref 0–1.5)
DEPRECATED RDW RBC AUTO: 48.4 FL (ref 37–54)
EOSINOPHIL # BLD AUTO: 0.2 10*3/MM3 (ref 0–0.4)
EOSINOPHIL NFR BLD AUTO: 0.3 % (ref 0.3–6.2)
ERYTHROCYTE [DISTWIDTH] IN BLOOD BY AUTOMATED COUNT: 13.6 % (ref 12.3–15.4)
HCT VFR BLD AUTO: 34.7 % (ref 37.5–51)
HGB BLD-MCNC: 10.9 G/DL (ref 13–17.7)
IMM GRANULOCYTES # BLD AUTO: 0.09 10*3/MM3 (ref 0–0.05)
IMM GRANULOCYTES NFR BLD AUTO: 0.1 % (ref 0–0.5)
LYMPHOCYTES # BLD AUTO: 54.54 10*3/MM3 (ref 0.7–3.1)
LYMPHOCYTES NFR BLD AUTO: 87.5 % (ref 19.6–45.3)
MCH RBC QN AUTO: 30.7 PG (ref 26.6–33)
MCHC RBC AUTO-ENTMCNC: 31.4 G/DL (ref 31.5–35.7)
MCV RBC AUTO: 97.7 FL (ref 79–97)
MONOCYTES # BLD AUTO: 2.34 10*3/MM3 (ref 0.1–0.9)
MONOCYTES NFR BLD AUTO: 3.8 % (ref 5–12)
NEUTROPHILS NFR BLD AUTO: 5.16 10*3/MM3 (ref 1.7–7)
NEUTROPHILS NFR BLD AUTO: 8.3 % (ref 42.7–76)
PLATELET # BLD AUTO: 159 10*3/MM3 (ref 140–450)
PMV BLD AUTO: 9.5 FL (ref 6–12)
RBC # BLD AUTO: 3.55 10*6/MM3 (ref 4.14–5.8)
WBC NRBC COR # BLD: 62.36 10*3/MM3 (ref 3.4–10.8)

## 2023-02-24 PROCEDURE — 99214 OFFICE O/P EST MOD 30 MIN: CPT | Performed by: INTERNAL MEDICINE

## 2023-02-24 PROCEDURE — 36415 COLL VENOUS BLD VENIPUNCTURE: CPT

## 2023-02-24 PROCEDURE — 85025 COMPLETE CBC W/AUTO DIFF WBC: CPT

## 2023-02-24 RX ORDER — CODEINE SULFATE 30 MG/1
TABLET ORAL
COMMUNITY
Start: 2023-01-20 | End: 2023-03-03

## 2023-02-24 NOTE — PROGRESS NOTES
CHIEF COMPLAINT: No new somatic complaints    Problem List:  Oncology/Hematology History Overview Note   1.  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  Tinnitus  3.  Fungal nail infection  4.  Osteoarthritis  5.  Sleep apnea on CPAP  6.  History of right inguinal herniorrhaphy  7.  Diverticulosis  8.  Hypertension    Hematology history timeline:  -3/8/2021 white count 39,560 with hemoglobin 14.1, platelets 138,000, 81% lymphocytes.  Peripheral smear shows smudge cells with atypical lymphocytes with enlarged nuclei with inconspicuous nucleoli.  An adequate volume for flow cytometry on initial blood draw.  C-reactive protein less than 0.3.  PSA normal 3.69 glucose 119 otherwise unremarkable CMP.      -3/19/2021 [facility] hematology oncology initial consultation: We will get peripheral blood flow cytometry flow cytometric hematolymphoid neoplasia assessment including ZAP70/CD38, cytogenetics, B-cell rearrangement, immunoglobulin variable heavy chain and p53 mutational analysis sequencing will be done, as well as snip MicroArray for CLL.  Absolute neutrophil count is normal at 4350 with absolute lymphocyte count 32,040.  Without frequent infections I will not check quantitative immunoglobulins.  CT scans are not necessary for diagnosis, surveillance, routine monitoring of treatment response, or progression.  CT scans may be warranted if symptoms of bulky disease or the assessment of risk for tumor lysis syndrome prior to initiation of venetoclax might be considered.  We will check lactate dehydrogenase, beta-2 microglobulin and uric acid.  Lymphocytosis itself is not an indication for treatment.  NCCN guideline indications for treatment include:  Fatigue severe  Night sweats  Unexplained weight loss  Fever without infection  Threatened endorgan function due to bulk  Spleen greater than 6 cm below costal margin  Lymph nodes greater  than 10 cm  Progressive nonhemolytic anemia hemoglobin less than 10 (hemolytic anemia treated with steroids)  Progressive nonimmune mediated destruction thrombocytopenia platelets less than 100,000 (ITP)  Steroid refractory cytopenias    If these thresholds are reached, the molecular mutational analysis I am doing will guide the specific therapy I recommend.  Presently he does not have any of those clinical thresholds.  He does have swollen MCP and PIP joints with brothers who have psoriatic arthritis and I will check his sedimentation rate, C-reactive protein, rheumatoid factor, and extractable nuclear antigens as well and ultimately may need rheumatological referral.  Rheumatologic diseases can cause some lymphocytosis including monoclonal lymphocytic process but this would be higher lymphocyte count than I would expect from such processes.    -3/19/2021 data:  White count 36,900, hemoglobin 14.2, platelets 154,000, absolute neutrophil count 7900, absolute lymphocyte count 26,500.  CMP glucose 112, bicarb 32, otherwise unremarkable with creatinine 1.19 and normal liver enzymes.  Magnesium 2.1  Hemolytic panel: MARIS negative.  .  Uric acid normal 6.8.  ELLIE negative.  Rheumatoid factor negative.  proBNP normal 6.1  Sedimentation rate less than 1 with C-reactive protein 0.38.  Beta-2 microglobulin 3.5 upper limit 2.2  Flow cytometry: Clonal CD5 positive B-cell population 70% of analyzed cells consistent with CLL/SLL  ZAP70 positive/CD 38+: Double positive considered unfavorable and tends to correlate with immunoglobulin variable heavy chain unmutated status.  Immunoglobulin variable heavy chain somatic hyper mutation analysis did not yield interpretable results.  Cancer cytogenetic analysis showed 47, XY, +12 in all 20 metaphases cells.  CLL FISH panel positive for trisomy 12 which is detected in 20% of B-cell CLL which is associated with an intermediate-poor prognosis  B-cell immunoglobulin heavy and kappa  light chain gene rearrangements detected corroborating of B-cell neoplasm.  T p53 mutation analysis negative by DNA sequencing.    -4/9/2021 [facility] [place] medical oncology follow-up visit: I reviewed the above molecular data with the patient.  The ZAP70 and CD38 and trisomy 12 positivity put him at an intermediate to high risk of progression I will watch him fairly closely.  I will repeat his CBC and CMP and physical exam in 2 months.  Thresholds for therapy as outlined above.  Ultimately what we choose to treat at that point depends on the COVID-19 pandemic status as much as his disease markers but he has not p53 mutated so we have a broader range of options.    -6/10/2021 [facility] [place] hematology follow-up visit: 6/4/2021 data reveals white count 43,850 with hemoglobin 12.8 platelets 147,000.  Absolute neutrophil count normal 4820.  No bulky adenopathy.  We will repeat labs again in 2 months.  Indications/thresholds for therapy for CLL as indicated above have not been reached.    -9/10/2021 [facility] [place] medical oncology follow-up visit: White count today is 52,700.  Hemoglobin is 13.9 with platelets 196,000 and no bulky adenopathy, fevers, chills, unexplained weight loss, effusions, bed drenching night sweats.  Hence has not reached NCCN guideline indications for treatment as outlined above.  We will see him back in 3 months with monthly CBC in the interim.  He has not had doubling of his lymphocyte count in less than 6 months either.  Recommended Covid vaccination with booster.    -12/20/2021 [facility] [place] Oncology clinic follow-up:  Continues to do well on surveillance, white count stable currently at 59,100, absolute lymphocytes 51.42, no anemia or thrombocytopenia.  He has no lymphadenopathy or other symptoms suggestive of disease progression.  We will continue with monthly CBC.  We will see him back in 3 months for follow-up.  If counts remain stable at that time we may be able to extend the time frame of checking his CBC out a  little bit.  Indications for treatment per NCCN guidelines are listed above note dated 3/19/2021.    -3/21/2022 [facility] medical oncology follow-up visit: Clinically he is doing well with no new somatic complaints.  His white count is actually lower over the last 3 months now 53,930 with a hemoglobin of 12.4 and platelets 166,000 with absolute neutrophil count quite adequate at 2700 and no frequent infections.  We will check his CBC in 3 months and again just prior to return in 6 months given stability of counts over time with no significant rise in white count and no triggers for need of treatment.    -9/30/2022 [facility] Oncology/Hematology clinic follow-up: Mr. Walsh continues to do well, still no indication for treatment.  CBC is stable with WBC 62,590, mild anemia with hemoglobin of 12.5, hematocrit 37.2%, MCV normal at 96.6, platelet count normal at 149,000.  He has no new worrisome symptoms.  No lymphadenopathy.  We will continue with observation, we will repeat CBC every 3 months and I have ordered that today.  We will see him back in 6 months for follow-up.     CLL (chronic lymphocytic leukemia) (HCC)   3/19/2021 Initial Diagnosis    CLL (chronic lymphocytic leukemia) (CMS/HCC)     4/9/2021 Cancer Staged    Staging form: Chronic Lymphocytic Leukemia / Small Lymphocytic Lymphoma, AJCC 8th Edition  - Clinical: Modified Mina Stage 0 (Modified Mina risk: Low, Lymphocytosis: Present, Adenopathy: Absent, Organomegaly: Absent, Anemia: Absent, Thrombocytopenia: Absent) - Signed by Levi Saunders MD on 4/9/2021         HISTORY OF PRESENT ILLNESS:  The patient is a 62 y.o. male, here for follow up on management of CLL without progression.  Status post transsphenoidal removal of pituitary macroadenoma with improvement in visual field cut since last I saw him  Past Medical History:   Diagnosis Date   • Cataract     Bilateral   • Diverticulosis    • Essential hypertension 03/2021    3/21 started Lisinopril   •  "Lymphocytosis 03/08/2021    3/2021. WBC 39.56. Abs lymphocyte 38. Plt 138. Hgb 14.1. Referral to hematology. CLL?   • Nonrheumatic mitral valve regurgitation 03/03/2021    Systolic murmur 4/6 noted 3/2021. TTE EF 56 - 60%. Mild to moderate mitral valve regurgitation, eccentric-anteriorly directed   • Osteoarthritis of knee    • Prediabetes 03/08/2021    3/2021 A1c 6.49   • Presbycusis of both ears 04/09/2021   • Pseudopolyp of sigmoid colon without complication (HCC) 04/09/2021   • Right retinal detachment     2/2022   • Sensorineural hearing loss (SNHL) of both ears 03/17/2021    3/2021 ENT. Also tinnitus. Recommended hearing aids   • Sleep apnea     On CPAP consistently   • Suprasellar mass 1/9/2023    Suprasellar mass on MRI 1/5/2023 - possible macroadenoma. Compression of optic nerve bilat   • Tubular adenoma of colon 03/03/2021 4/2021: Tubular adenoma of sigmoid. No high grade.   • Type 2 diabetes mellitus without complication, without long-term current use of insulin ([facility]) 11/11/2022 11/2022 A1c 6.7   • Vitreous detachment of left eye     2/15/2022.     Past Surgical History:   Procedure Laterality Date   • CATARACT EXTRACTION      december 2022   • COLONOSCOPY      4/8/21   • FINGER GANGLION CYST EXCISION Right     3rd finger in the 90s   • INGUINAL HERNIA REPAIR Right    • RETINAL DETACHMENT SURGERY      right eye  on 3/18/22       No Known Allergies    Family History and Social History reviewed and changed as necessary    REVIEW OF SYSTEM:   No new somatic complaints    PHYSICAL EXAM:  No palpable cervical axillary or inguinal adenopathy    Vitals:    02/24/23 1002   BP: 119/68   Pulse: 74   Resp: 20   Temp: 97.9 °F (36.6 °C)   SpO2: 96%   Weight: 108 kg (238 lb)   Height: 179.1 cm (70.5\")     Vitals:    02/24/23 1002   PainSc: 0-No pain          ECOG score: 0           Vitals reviewed.    ECOG: (0) Fully Active - Able to Carry On All Pre-disease Performance Without Restriction    Lab Results "   Component Value Date    HGB 10.9 (L) 02/24/2023    HCT 34.7 (L) 02/24/2023    MCV 97.7 (H) 02/24/2023     02/24/2023    WBC 62.36 (H) 02/24/2023    NEUTROABS 5.16 02/24/2023    LYMPHSABS 54.54 (H) 02/24/2023    MONOSABS 2.34 (H) 02/24/2023    EOSABS 0.20 02/24/2023    BASOSABS 0.03 02/24/2023       Lab Results   Component Value Date    GLUCOSE 137 (H) 01/18/2023    BUN 14 11/11/2022    CREATININE 1.46 (H) 11/11/2022     01/20/2023    K 4.4 01/18/2023     (H) 01/18/2023    CO2 30.0 (H) 11/11/2022    CALCIUM 9.9 11/11/2022    PROTEINTOT 6.6 11/11/2022    ALBUMIN 4.40 11/11/2022    BILITOT 0.5 11/11/2022    ALKPHOS 54 11/11/2022    AST 21 11/11/2022    ALT 21 11/11/2022             ASSESSMENT & PLAN:  1.  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  Tinnitus  3.  Fungal nail infection  4.  Osteoarthritis  5.  Sleep apnea on CPAP  6.  History of right inguinal herniorrhaphy  7.  Diverticulosis  8.  Hypertension  9.  Status post transsphenoidal hypophysectomy in for nonfunctional pituitary macroadenoma with lateral field cuts improved postop    Hematology history timeline:  -3/8/2021 white count 39,560 with hemoglobin 14.1, platelets 138,000, 81% lymphocytes.  Peripheral smear shows smudge cells with atypical lymphocytes with enlarged nuclei with inconspicuous nucleoli.  An adequate volume for flow cytometry on initial blood draw.  C-reactive protein less than 0.3.  PSA normal 3.69 glucose 119 otherwise unremarkable CMP.      -3/19/2021 [facility] hematology oncology initial consultation: We will get peripheral blood flow cytometry flow cytometric hematolymphoid neoplasia assessment including ZAP70/CD38, cytogenetics, B-cell rearrangement, immunoglobulin variable heavy chain and p53 mutational analysis sequencing will be done, as well as snip MicroArray for CLL.  Absolute neutrophil count is normal at 4350 with  absolute lymphocyte count 32,040.  Without frequent infections I will not check quantitative immunoglobulins.  CT scans are not necessary for diagnosis, surveillance, routine monitoring of treatment response, or progression.  CT scans may be warranted if symptoms of bulky disease or the assessment of risk for tumor lysis syndrome prior to initiation of venetoclax might be considered.  We will check lactate dehydrogenase, beta-2 microglobulin and uric acid.  Lymphocytosis itself is not an indication for treatment.  NCCN guideline indications for treatment include:  Fatigue severe  Night sweats  Unexplained weight loss  Fever without infection  Threatened endorgan function due to bulk  Spleen greater than 6 cm below costal margin  Lymph nodes greater than 10 cm  Progressive nonhemolytic anemia hemoglobin less than 10 (hemolytic anemia treated with steroids)  Progressive nonimmune mediated destruction thrombocytopenia platelets less than 100,000 (ITP)  Steroid refractory cytopenias    If these thresholds are reached, the molecular mutational analysis I am doing will guide the specific therapy I recommend.  Presently he does not have any of those clinical thresholds.  He does have swollen MCP and PIP joints with brothers who have psoriatic arthritis and I will check his sedimentation rate, C-reactive protein, rheumatoid factor, and extractable nuclear antigens as well and ultimately may need rheumatological referral.  Rheumatologic diseases can cause some lymphocytosis including monoclonal lymphocytic process but this would be higher lymphocyte count than I would expect from such processes.    -3/19/2021 data:  White count 36,900, hemoglobin 14.2, platelets 154,000, absolute neutrophil count 7900, absolute lymphocyte count 26,500.  CMP glucose 112, bicarb 32, otherwise unremarkable with creatinine 1.19 and normal liver enzymes.  Magnesium 2.1  Hemolytic panel: MARIS negative.  .  Uric acid normal 6.8.  ELLIE  negative.  Rheumatoid factor negative.  proBNP normal 6.1  Sedimentation rate less than 1 with C-reactive protein 0.38.  Beta-2 microglobulin 3.5 upper limit 2.2  Flow cytometry: Clonal CD5 positive B-cell population 70% of analyzed cells consistent with CLL/SLL  ZAP70 positive/CD 38+: Double positive considered unfavorable and tends to correlate with immunoglobulin variable heavy chain unmutated status.  Immunoglobulin variable heavy chain somatic hyper mutation analysis did not yield interpretable results.  Cancer cytogenetic analysis showed 47, XY, +12 in all 20 metaphases cells.  CLL FISH panel positive for trisomy 12 which is detected in 20% of B-cell CLL which is associated with an intermediate-poor prognosis  B-cell immunoglobulin heavy and kappa light chain gene rearrangements detected corroborating of B-cell neoplasm.  T p53 mutation analysis negative by DNA sequencing.    -4/9/2021 [facility] medical oncology follow-up visit: I reviewed the above molecular data with the patient.  The ZAP70 and CD38 and trisomy 12 positivity put him at an intermediate to high risk of progression I will watch him fairly closely.  I will repeat his CBC and CMP and physical exam in 2 months.  Thresholds for therapy as outlined above.  Ultimately what we choose to treat at that point depends on the COVID-19 pandemic status as much as his disease markers but he has not p53 mutated so we have a broader range of options.    -6/10/2021 [facility] hematology follow-up visit: 6/4/2021 data reveals white count 43,850 with hemoglobin 12.8 platelets 147,000.  Absolute neutrophil count normal 4820.  No bulky adenopathy.  We will repeat labs again in 2 months.  Indications/thresholds for therapy for CLL as indicated above have not been reached.    -9/10/2021 [facility] medical oncology follow-up visit: White count today is 52,700.  Hemoglobin is 13.9 with platelets 196,000 and no bulky adenopathy, fevers, chills, unexplained weight loss,  effusions, bed drenching night sweats.  Hence has not reached NCCN guideline indications for treatment as outlined above.  We will see him back in 3 months with monthly CBC in the interim.  He has not had doubling of his lymphocyte count in less than 6 months either.  Recommended Covid vaccination with booster.    -12/20/2021 [facility] Oncology clinic follow-up:  Continues to do well on surveillance, white count stable currently at 59,100, absolute lymphocytes 51.42, no anemia or thrombocytopenia.  He has no lymphadenopathy or other symptoms suggestive of disease progression.  We will continue with monthly CBC.  We will see him back in 3 months for follow-up.  If counts remain stable at that time we may be able to extend the time frame of checking his CBC out a little bit.  Indications for treatment per NCCN guidelines are listed above note dated 3/19/2021.    -3/21/2022 [facility] medical oncology follow-up visit: Clinically he is doing well with no new somatic complaints.  His white count is actually lower over the last 3 months now 53,930 with a hemoglobin of 12.4 and platelets 166,000 with absolute neutrophil count quite adequate at 2700 and no frequent infections.  We will check his CBC in 3 months and again just prior to return in 6 months given stability of counts over time with no significant rise in white count and no triggers for need of treatment.    -9/30/2022 [facility] Oncology/Hematology clinic follow-up: Mr. Walsh continues to do well, still no indication for treatment.  CBC is stable with WBC 62,590, mild anemia with hemoglobin of 12.5, hematocrit 37.2%, MCV normal at 96.6, platelet count normal at 149,000.  He has no new worrisome symptoms.  No lymphadenopathy.  We will continue with observation, we will repeat CBC every 3 months and I have ordered that today.  We will see him back in 6 months for follow-up.    - 2/21/2023 follow-up Dr.Saini MORRIS ENT.  He had transsphenoidal hypophysis ectomy with right  nasoseptal flap 1/18/2023.  Nasal congestion improved.  No watery drainage.  Back on CPAP.  Slow improvement of smell.  Had large sellar mass.  Found on evaluation by Dr. Bell endocrinology at [facility].  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  Postop MRI showed possible residual near cavernous sinus Per Sammy review of postop.  Improvement in visual field cuts.  Following with Dr. Bell.  Follow-up with Dr. Christianson with cerebrovascular, skull-based, and endovascular neurosurgical group at  mid-March.    -2/24/2023 [facility] medical oncology hematology follow-up:White count today virtually identical to September 2022… White count 62,360.  Hemoglobin slightly lower at 10.9 but with normochromic normocytic indices and platelets normal 159,000 with absolute lymphocyte count 54,540 and normal absolute neutrophil count 5160.  No bulky palpable cervical axillary or inguinal adenopathy.  No frequent infections.  No hint of this progressing CLL.  We will repeat his labs on return in 6 months.    Total time of care today inclusive of time spent today prior to his arrival reviewing multiplicity of notes from neurosurgery, endocrinology, primary care, ENT regarding his macroadenoma and management thereof and follow-up thereof and interval labs and during visit translating all of that and going over signs or symptoms of his CLL or lack thereof and plan for watchful waiting after visit setting follow-up took 35 minutes of patient care time throughout the day today.  Levi Saunders MD    02/24/2023

## 2023-03-03 ENCOUNTER — OFFICE VISIT (OUTPATIENT)
Dept: ENDOCRINOLOGY | Facility: CLINIC | Age: 63
End: 2023-03-03
Payer: COMMERCIAL

## 2023-03-03 VITALS
DIASTOLIC BLOOD PRESSURE: 74 MMHG | SYSTOLIC BLOOD PRESSURE: 118 MMHG | HEART RATE: 62 BPM | OXYGEN SATURATION: 98 % | BODY MASS INDEX: 33.18 KG/M2 | WEIGHT: 237 LBS | HEIGHT: 71 IN

## 2023-03-03 DIAGNOSIS — D35.2 PITUITARY MACROADENOMA WITH EXTRASELLAR EXTENSION: Primary | ICD-10-CM

## 2023-03-03 LAB
ALBUMIN SERPL-MCNC: 3.9 G/DL (ref 3.5–5.2)
ALBUMIN/GLOB SERPL: 1.5 G/DL
ALP SERPL-CCNC: 52 U/L (ref 39–117)
ALT SERPL W P-5'-P-CCNC: 15 U/L (ref 1–41)
ANION GAP SERPL CALCULATED.3IONS-SCNC: 9 MMOL/L (ref 5–15)
AST SERPL-CCNC: 15 U/L (ref 1–40)
BILIRUB SERPL-MCNC: 0.3 MG/DL (ref 0–1.2)
BUN SERPL-MCNC: 16 MG/DL (ref 8–23)
BUN/CREAT SERPL: 15.5 (ref 7–25)
CALCIUM SPEC-SCNC: 9 MG/DL (ref 8.6–10.5)
CHLORIDE SERPL-SCNC: 105 MMOL/L (ref 98–107)
CO2 SERPL-SCNC: 28 MMOL/L (ref 22–29)
CORTIS AM PEAK SERPL-MCNC: 6.69 MCG/DL
CREAT SERPL-MCNC: 1.03 MG/DL (ref 0.76–1.27)
EGFRCR SERPLBLD CKD-EPI 2021: 81.1 ML/MIN/1.73
FSH SERPL-ACNC: 3.24 MIU/ML
GLOBULIN UR ELPH-MCNC: 2.6 GM/DL
GLUCOSE SERPL-MCNC: 108 MG/DL (ref 65–99)
LH SERPL-ACNC: 3.37 MIU/ML
OSMOLALITY UR: 345 MOSM/KG
POTASSIUM SERPL-SCNC: 4.5 MMOL/L (ref 3.5–5.2)
PROLACTIN SERPL-MCNC: 13.4 NG/ML (ref 4.04–15.2)
PROT SERPL-MCNC: 6.5 G/DL (ref 6–8.5)
SODIUM SERPL-SCNC: 142 MMOL/L (ref 136–145)
T4 FREE SERPL-MCNC: 1.13 NG/DL (ref 0.93–1.7)
TSH SERPL DL<=0.05 MIU/L-ACNC: 2.05 UIU/ML (ref 0.27–4.2)

## 2023-03-03 PROCEDURE — 99214 OFFICE O/P EST MOD 30 MIN: CPT | Performed by: INTERNAL MEDICINE

## 2023-03-03 PROCEDURE — 83001 ASSAY OF GONADOTROPIN (FSH): CPT | Performed by: INTERNAL MEDICINE

## 2023-03-03 PROCEDURE — 83935 ASSAY OF URINE OSMOLALITY: CPT | Performed by: INTERNAL MEDICINE

## 2023-03-03 PROCEDURE — 84439 ASSAY OF FREE THYROXINE: CPT | Performed by: INTERNAL MEDICINE

## 2023-03-03 PROCEDURE — 36415 COLL VENOUS BLD VENIPUNCTURE: CPT | Performed by: INTERNAL MEDICINE

## 2023-03-03 PROCEDURE — 83002 ASSAY OF GONADOTROPIN (LH): CPT | Performed by: INTERNAL MEDICINE

## 2023-03-03 PROCEDURE — 82024 ASSAY OF ACTH: CPT | Performed by: INTERNAL MEDICINE

## 2023-03-03 PROCEDURE — 84146 ASSAY OF PROLACTIN: CPT | Performed by: INTERNAL MEDICINE

## 2023-03-03 PROCEDURE — 84443 ASSAY THYROID STIM HORMONE: CPT | Performed by: INTERNAL MEDICINE

## 2023-03-03 PROCEDURE — 82533 TOTAL CORTISOL: CPT | Performed by: INTERNAL MEDICINE

## 2023-03-03 PROCEDURE — 80053 COMPREHEN METABOLIC PANEL: CPT | Performed by: INTERNAL MEDICINE

## 2023-03-03 NOTE — PROGRESS NOTES
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  Last visit 1/17/2023. His history is as follows:    Pt is accompanied by his wife today.    Interim Events:  - (01/18/2023) s/p endoscopic transsphenoidal resection of his pituitary tumor by Dr. Donnie Christianson at [facility]  - denies increased thirst. Urinating overnight intermittently but attributes to drinking water overnight  - denies increased urination during the day  - Denies HA's  - Reports improvement in his peripheral vision  - Denies fatigue    1) pituitary macroadenoma with extrasellar extension:  - Patient developed decreased vision in early 2022 after left eye vitreous detachment. He vision did not improve over time which was thought to be due to a cataract. After undergoing left eye cataract surgery in December 2022, he continued to have decreased vision.  This was further evaluated by his ophthalmologist with an MRI of the brain and orbits with and without contrast on 01/5/2023 at [facility].  The report described a 2.8 cm (AP) x 3.5 cm (mediolateral) x 3.3 cm (CC) suprasellar mass with \" marked impingement upon the optic chiasm and nerves, asymmetrically worse on the left.\"   -Patient was evaluated by his PCP on 01/11/2023 and the following labs were collected:  (01/11/2023 at 10:36 AM)   LH 5.23, FSH 5.77  TSH 0.771, no free T4 collected  Prolactin 41.20 - Stalk effect  10:30AM cortisol 8.29, no ACTH collected    - (01/17/2023) Initial Endocrine Visit:  Lab evaluation (collected 12:40pm) - no medications initiated  ACTH 59.8 (7.2 - 63.3), Cortisol 5.24, IGF-1 77 (64 - 240), Free T4 0.79 (0.93 - 1.70)    - (01/18/2023) s/p endoscopic transsphenoidal resection of his pituitary tumor by Dr. Donnie Christianson at [facility]. No complications " "post-operatively.  Post-op labs:  (01/18/2023) Surgical Pathology: \"pituitary adenoma\"  (01/19/2023) POD#1 - Na 139  (01/20/2023) POD#2 - Na 137, 5AM cortisol 29.2    Other Medical History:   - Has Type 2 DM and is currently on metformin 500 mg BID  - Has CLL and is followed by heme/onc at   - Has KARRIE and is on CPAP  - Has chronic hearing loss and tinnitus. Wears hearing aids  - Has osteoarthritis    Review of Systems   Constitutional: Negative for fatigue.        Weight stable   HENT: Positive for hearing loss and tinnitus.    Eyes: Positive for photophobia. Negative for visual disturbance (peripheral vision has improved).   Respiratory: Negative.    Cardiovascular: Negative.    Gastrointestinal: Negative.    Endocrine: Negative.  Negative for polydipsia and polyuria.   Genitourinary: Negative.  Negative for frequency.   Musculoskeletal: Positive for arthralgias, back pain and myalgias.   Skin: Negative.    Allergic/Immunologic: Negative.    Neurological: Negative.  Negative for dizziness, weakness, light-headedness and headaches.   Hematological: Negative.    Psychiatric/Behavioral: Negative.      The following portions of the patient's history were reviewed and updated as appropriate: allergies, current medications, past family history, past medical history, past social history, past surgical history and problem list.      /74   Pulse 62   Ht 179.1 cm (70.5\")   Wt 108 kg (237 lb)   SpO2 98%   BMI 33.53 kg/m²   Physical Exam  Vitals reviewed.   Constitutional:       General: He is not in acute distress.     Appearance: He is well-developed. He is not diaphoretic.   HENT:      Head: Normocephalic.      Mouth/Throat:      Mouth: Mucous membranes are moist.      Pharynx: Oropharynx is clear.   Eyes:      Conjunctiva/sclera: Conjunctivae normal.      Pupils: Pupils are equal, round, and reactive to light.   Neck:      Thyroid: No thyromegaly.      Trachea: No tracheal deviation.      Comments: No palpable " thyroid nodules    Cardiovascular:      Rate and Rhythm: Normal rate and regular rhythm.      Heart sounds: Normal heart sounds. No murmur heard.  Pulmonary:      Effort: Pulmonary effort is normal. No respiratory distress.      Breath sounds: Normal breath sounds.   Abdominal:      General: Bowel sounds are normal.      Palpations: Abdomen is soft. There is no mass.      Tenderness: There is no abdominal tenderness.   Musculoskeletal:      Cervical back: Neck supple.   Lymphadenopathy:      Cervical: No cervical adenopathy.   Skin:     General: Skin is warm and dry.      Findings: No erythema.   Neurological:      Mental Status: He is alert and oriented to person, place, and time.      Comments: No visual deficits in left upper and lower quadrants on VFT.   Psychiatric:         Behavior: Behavior normal.         LABS/IMAGING: outside records reviewed and summarized in HPI  Results for orders placed or performed in visit on 03/03/23   Cortisol - AM    Specimen: Arm, Left; Blood   Result Value Ref Range    Cortisol - AM 6.69 mcg/dL   ACTH    Specimen: Arm, Left; Blood   Result Value Ref Range    ACTH 61.9 7.2 - 63.3 pg/mL   TSH    Specimen: Arm, Left; Blood   Result Value Ref Range    TSH 2.050 0.270 - 4.200 uIU/mL   T4, Free    Specimen: Arm, Left; Blood   Result Value Ref Range    Free T4 1.13 0.93 - 1.70 ng/dL   Prolactin    Specimen: Arm, Left; Blood   Result Value Ref Range    Prolactin 13.40 4.04 - 15.20 ng/mL   Follicle Stimulating Hormone    Specimen: Arm, Left; Blood   Result Value Ref Range    FSH 3.24 mIU/mL   Luteinizing Hormone    Specimen: Arm, Left; Blood   Result Value Ref Range    LH 3.37 mIU/mL   Comprehensive Metabolic Panel    Specimen: Arm, Left; Blood   Result Value Ref Range    Glucose 108 (H) 65 - 99 mg/dL    BUN 16 8 - 23 mg/dL    Creatinine 1.03 0.76 - 1.27 mg/dL    Sodium 142 136 - 145 mmol/L    Potassium 4.5 3.5 - 5.2 mmol/L    Chloride 105 98 - 107 mmol/L    CO2 28.0 22.0 - 29.0 mmol/L     Calcium 9.0 8.6 - 10.5 mg/dL    Total Protein 6.5 6.0 - 8.5 g/dL    Albumin 3.9 3.5 - 5.2 g/dL    ALT (SGPT) 15 1 - 41 U/L    AST (SGOT) 15 1 - 40 U/L    Alkaline Phosphatase 52 39 - 117 U/L    Total Bilirubin 0.3 0.0 - 1.2 mg/dL    Globulin 2.6 gm/dL    A/G Ratio 1.5 g/dL    BUN/Creatinine Ratio 15.5 7.0 - 25.0    Anion Gap 9.0 5.0 - 15.0 mmol/L    eGFR 81.1 >60.0 mL/min/1.73   Osmolality, Urine - Urine, Clean Catch    Specimen: Urine, Clean Catch   Result Value Ref Range    Osmolality, Urine 345 mOsm/kg       ASSESSMENT/PLAN:  1) pituitary macroadenoma with extrasellar extension s/p TSR on 01/18/2023:   - Pt is clinically doing well post-operatively.  His peripheral vision has improved  - CMP, urine Osm, CMP, AM ACTH, AM Cortisol, prolactin, FSH, LH, TSH, free T4 levels checked today  - No clinical or biochemical evidence of secondary adrenal insufficiency, secondary hypothyroidism, or secondary hypogonadism  - No clinical evidence of diabetes insipidus or SIADH at this time. Pt holding chlorthalidone at this time and monitoring his blood pressure at home.   - Has f/u imaging and visit with [facility] on 03/27/2023  - Will continue to monitor pituitary function tests as [facility] 05/01/2023    Signed: Ayde Bell MD

## 2023-03-04 LAB — ACTH PLAS-MCNC: 61.9 PG/ML (ref 7.2–63.3)

## 2023-04-29 DIAGNOSIS — I10 ESSENTIAL HYPERTENSION: ICD-10-CM

## 2023-05-01 ENCOUNTER — OFFICE VISIT (OUTPATIENT)
Dept: ENDOCRINOLOGY | Facility: CLINIC | Age: 63
End: 2023-05-01
Payer: COMMERCIAL

## 2023-05-01 VITALS
OXYGEN SATURATION: 97 % | DIASTOLIC BLOOD PRESSURE: 80 MMHG | BODY MASS INDEX: 32.76 KG/M2 | SYSTOLIC BLOOD PRESSURE: 124 MMHG | HEART RATE: 68 BPM | HEIGHT: 71 IN | WEIGHT: 234 LBS

## 2023-05-01 DIAGNOSIS — D35.2 PITUITARY MACROADENOMA WITH EXTRASELLAR EXTENSION: Primary | ICD-10-CM

## 2023-05-01 PROCEDURE — 99214 OFFICE O/P EST MOD 30 MIN: CPT | Performed by: INTERNAL MEDICINE

## 2023-05-01 RX ORDER — CHLORTHALIDONE 25 MG/1
25 TABLET ORAL AS NEEDED
COMMUNITY

## 2023-05-01 RX ORDER — LISINOPRIL 10 MG/1
TABLET ORAL
Qty: 90 TABLET | Refills: 0 | Status: SHIPPED | OUTPATIENT
Start: 2023-05-01

## 2023-05-01 NOTE — PROGRESS NOTES
"Chief Complaint   Patient presents with   • Pituitary macroadenoma with extrasellar extension     Follow up        HPI:   Sigifredo Walsh is a 62 y.o.male who presents to endocrine clinic for follow-up evaluation after surgery and XRT for his pituitary macroadenoma.  Last visit 03/03/2023. His history is as follows:    Pt is accompanied by his wife today.    Interim Events:  - Is s/p gamma knife radiosurgery at [facility] on 04/10/2023.   - Reports overall feeling well.  - denies increased thirst. Urinating overnight intermittently but attributes to drinking water overnight  - denies increased urination during the day  - Denies HA's  - Reports improvement in his peripheral vision  - Occasional fatigue  - Is taking chlorthalidone now as needed for mild lower extremity edema    1) pituitary macroadenoma with extrasellar extension:  - Patient developed decreased vision in early 2022 after left eye vitreous detachment. He vision did not improve over time which was thought to be due to a cataract. After undergoing left eye cataract surgery in December 2022, he continued to have decreased vision.  This was further evaluated by his ophthalmologist with an MRI of the brain and orbits with and without contrast on 01/5/2023 at [facility].  The report described a 2.8 cm (AP) x 3.5 cm (mediolateral) x 3.3 cm (CC) suprasellar mass with \" marked impingement upon the optic chiasm and nerves, asymmetrically worse on the left.\"   -Patient was evaluated by his PCP on 01/11/2023 and the following labs were collected:  (01/11/2023 at 10:36 AM)   LH 5.23, FSH 5.77  TSH 0.771, no free T4 collected  Prolactin 41.20 - Stalk effect  10:30AM cortisol 8.29, no ACTH collected    - (01/17/2023) Initial Endocrine Visit:  Lab evaluation (collected 12:40pm) - no medications initiated  ACTH 59.8 (7.2 - 63.3), Cortisol 5.24, IGF-1 77 (64 - 240), Free T4 0.79 (0.93 - 1.70)    - (01/18/2023) s/p endoscopic transsphenoidal resection of his " "pituitary tumor by Dr. Donnie Christianson at [facility]. No complications post-operatively.  Post-op labs:  (01/18/2023) Surgical Pathology: \"pituitary adenoma\"  (01/19/2023) POD#1 - Na 139  (01/20/2023) POD#2 - Na 137, 5AM cortisol 29.2    - (03/03/2023) - CMP, urine Osm, CMP, AM ACTH, AM Cortisol, prolactin, FSH, LH, TSH, free T4 levels checked and showed no deficits    -  (04/10/2023) Is s/p gamma knife radiosurgery at [facility]     Other Medical History:   - Has Type 2 DM and is currently on metformin 500 mg BID  - Has CLL and is followed by heme/onc at   - Has KARRIE and is on CPAP  - Has chronic hearing loss and tinnitus. Wears hearing aids  - Has osteoarthritis    Review of Systems   Constitutional: Positive for fatigue (intermittent).        Weight stable   HENT: Positive for hearing loss and tinnitus.    Eyes: Positive for photophobia. Negative for visual disturbance (peripheral vision has improved).   Respiratory: Negative.    Cardiovascular: Positive for leg swelling (mild, intermittent).   Gastrointestinal: Negative.    Endocrine: Negative.  Negative for polydipsia and polyuria.   Genitourinary: Negative.  Negative for frequency.   Musculoskeletal: Positive for arthralgias, back pain and myalgias.   Skin: Negative.    Allergic/Immunologic: Negative.    Neurological: Negative.  Negative for dizziness, weakness, light-headedness and headaches.   Hematological: Negative.    Psychiatric/Behavioral: Negative.      The following portions of the patient's history were reviewed and updated as appropriate: allergies, current medications, past family history, past medical history, past social history, past surgical history and problem list.      /80   Pulse 68   Ht 179.1 cm (70.5\")   Wt 106 kg (234 lb)   SpO2 97%   BMI 33.10 kg/m²   Physical Exam  Vitals reviewed.   Constitutional:       General: He is not in acute distress.     Appearance: He is well-developed. He is not diaphoretic.   HENT:      Head: Normocephalic. "      Mouth/Throat:      Mouth: Mucous membranes are moist.      Pharynx: Oropharynx is clear.   Eyes:      Conjunctiva/sclera: Conjunctivae normal.      Pupils: Pupils are equal, round, and reactive to light.   Neck:      Thyroid: No thyromegaly.      Trachea: No tracheal deviation.      Comments: No palpable thyroid nodules    Cardiovascular:      Rate and Rhythm: Normal rate and regular rhythm.      Heart sounds: Murmur (chronic, MV regurgitation) heard.   Pulmonary:      Effort: Pulmonary effort is normal. No respiratory distress.      Breath sounds: Normal breath sounds.   Abdominal:      General: Bowel sounds are normal.      Palpations: Abdomen is soft. There is no mass.      Tenderness: There is no abdominal tenderness.   Musculoskeletal:      Cervical back: Neck supple.      Right lower leg: Edema (mild) present.      Left lower leg: Edema (mild) present.   Lymphadenopathy:      Cervical: No cervical adenopathy.   Skin:     General: Skin is warm and dry.      Findings: No erythema.   Neurological:      Mental Status: He is alert and oriented to person, place, and time.      Comments: No visual deficits in left upper and lower quadrants on VFT.   Psychiatric:         Behavior: Behavior normal.         LABS/IMAGING: outside records reviewed and summarized in HPI  Results for orders placed or performed in visit on 03/03/23   Cortisol - AM    Specimen: Arm, Left; Blood   Result Value Ref Range    Cortisol - AM 6.69 mcg/dL   ACTH    Specimen: Arm, Left; Blood   Result Value Ref Range    ACTH 61.9 7.2 - 63.3 pg/mL   TSH    Specimen: Arm, Left; Blood   Result Value Ref Range    TSH 2.050 0.270 - 4.200 uIU/mL   T4, Free    Specimen: Arm, Left; Blood   Result Value Ref Range    Free T4 1.13 0.93 - 1.70 ng/dL   Prolactin    Specimen: Arm, Left; Blood   Result Value Ref Range    Prolactin 13.40 4.04 - 15.20 ng/mL   Follicle Stimulating Hormone    Specimen: Arm, Left; Blood   Result Value Ref Range    FSH 3.24 mIU/mL    Luteinizing Hormone    Specimen: Arm, Left; Blood   Result Value Ref Range    LH 3.37 mIU/mL   Comprehensive Metabolic Panel    Specimen: Arm, Left; Blood   Result Value Ref Range    Glucose 108 (H) 65 - 99 mg/dL    BUN 16 8 - 23 mg/dL    Creatinine 1.03 0.76 - 1.27 mg/dL    Sodium 142 136 - 145 mmol/L    Potassium 4.5 3.5 - 5.2 mmol/L    Chloride 105 98 - 107 mmol/L    CO2 28.0 22.0 - 29.0 mmol/L    Calcium 9.0 8.6 - 10.5 mg/dL    Total Protein 6.5 6.0 - 8.5 g/dL    Albumin 3.9 3.5 - 5.2 g/dL    ALT (SGPT) 15 1 - 41 U/L    AST (SGOT) 15 1 - 40 U/L    Alkaline Phosphatase 52 39 - 117 U/L    Total Bilirubin 0.3 0.0 - 1.2 mg/dL    Globulin 2.6 gm/dL    A/G Ratio 1.5 g/dL    BUN/Creatinine Ratio 15.5 7.0 - 25.0    Anion Gap 9.0 5.0 - 15.0 mmol/L    eGFR 81.1 >60.0 mL/min/1.73   Osmolality, Urine - Urine, Clean Catch    Specimen: Urine, Clean Catch   Result Value Ref Range    Osmolality, Urine 345 mOsm/kg       ASSESSMENT/PLAN:  1) pituitary macroadenoma with extrasellar extension s/p TSR on 01/18/2023, s/p [facility] on 04/10/2023:   - Pt is clinically doing well post-operatively.  His peripheral vision has improved  - Reviewed risks for pituitary deficiencies after gamma knife   - CMP, urine Osm, CMP, AM ACTH, AM Cortisol, prolactin, FSH, LH, TSH, free T4 levels checked 03/2023 showed no deficits  - No clinical or biochemical evidence of secondary adrenal insufficiency, secondary hypothyroidism, or secondary hypogonadism at this time  - No clinical evidence of diabetes insipidus or SIADH at this time. Advised pt that it is okay to take chlorthalidone at this time.  - Will continue to monitor pituitary function tests as indiacted  - Have ordered AM cortisol, ACTH, TSH, free T4 for 05/17/2023    RTC 4 months    Signed: Ayde Bell MD

## 2023-05-17 ENCOUNTER — OFFICE VISIT (OUTPATIENT)
Dept: INTERNAL MEDICINE | Facility: CLINIC | Age: 63
End: 2023-05-17
Payer: COMMERCIAL

## 2023-05-17 VITALS
DIASTOLIC BLOOD PRESSURE: 70 MMHG | TEMPERATURE: 98.4 F | SYSTOLIC BLOOD PRESSURE: 110 MMHG | BODY MASS INDEX: 32.98 KG/M2 | WEIGHT: 235.6 LBS | HEART RATE: 60 BPM | HEIGHT: 71 IN

## 2023-05-17 DIAGNOSIS — D35.2 PITUITARY MACROADENOMA WITH EXTRASELLAR EXTENSION: ICD-10-CM

## 2023-05-17 DIAGNOSIS — Z00.00 WELL ADULT EXAM: Primary | ICD-10-CM

## 2023-05-17 DIAGNOSIS — E11.9 TYPE 2 DIABETES MELLITUS WITHOUT COMPLICATION, WITHOUT LONG-TERM CURRENT USE OF INSULIN: Chronic | ICD-10-CM

## 2023-05-17 DIAGNOSIS — I10 ESSENTIAL HYPERTENSION: Chronic | ICD-10-CM

## 2023-05-17 DIAGNOSIS — E66.9 OBESITY (BMI 30.0-34.9): ICD-10-CM

## 2023-05-17 DIAGNOSIS — C91.10 CLL (CHRONIC LYMPHOCYTIC LEUKEMIA): Chronic | ICD-10-CM

## 2023-05-17 NOTE — PROGRESS NOTES
"Chief Complaint  Sigifredo Walsh is a 62 y.o. male presenting for Annual Exam.     Patient has a past medical history of T2DM (11/2022), pituitary failure after pituitary macroadenoma (3.5 cm, visual defect, s/p transsphenoidal pituitary resection, 1/18/2023) diverticulosis, hypertension, obesity, osteoarthritis of the knee, KARRIE on CPAP and CLL (2021, f/u Dr. Saunders, [facility]on[facility]) and mitral valve regurgitation.     No nasal swabs, tubes or testing placed in patient's nose.  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    History of Present Illness  Patient is here for annual physical.    He is overall doing well, his energy is back to normal, he is physically active, he is taking trips.  He saw the eye doctor after his surgery and his field of vision now has normalized.  He is working 12-hour workdays.  He is not planning to retire until he turns 69, his wife will be 65 at that point.    Patient continues to follow-up with endocrinology, he also has upcoming appointment with the surgeon at  and with oncology in the fall.    The following portions of the patient's history were reviewed and updated as appropriate: allergies, current medications, past family history, past medical history, past social history, past surgical history and problem list.    Objective  /70 (BP Location: Left arm, Patient Position: Sitting, Cuff Size: Adult)   Pulse 60   Temp 98.4 °F (36.9 °C) (Temporal)   Ht 181 cm (71.26\")   Wt 107 kg (235 lb 9.6 oz)   BMI 32.62 kg/m²     Physical Exam  Vitals reviewed.   Constitutional:       Appearance: Normal appearance.   HENT:      Head: Normocephalic and atraumatic.      Nose: No congestion.   Eyes:      Extraocular Movements: Extraocular movements intact.      Conjunctiva/sclera: Conjunctivae normal.   Cardiovascular:      Rate and Rhythm: Normal rate and regular rhythm.      Heart sounds: Murmur heard.      Comments: Systolic murmur grade " 3/6  Pulmonary:      Effort: Pulmonary effort is normal.      Breath sounds: Normal breath sounds.   Abdominal:      General: There is no distension.      Palpations: Abdomen is soft. There is no mass.      Tenderness: There is no abdominal tenderness.   Musculoskeletal:      Cervical back: Neck supple. No tenderness.      Right lower leg: Edema present.      Left lower leg: Edema present.   Lymphadenopathy:      Cervical: No cervical adenopathy.      Upper Body:      Right upper body: No supraclavicular or axillary adenopathy.      Left upper body: No supraclavicular or axillary adenopathy.      Lower Body: No right inguinal adenopathy. No left inguinal adenopathy.   Skin:     General: Skin is warm and dry.   Neurological:      Mental Status: He is alert and oriented to person, place, and time. Mental status is at baseline.   Psychiatric:         Behavior: Behavior normal.         Thought Content: Thought content normal.         Assessment/Plan   1. Well adult exam    2. Pituitary macroadenoma with extrasellar extension  Doing well and has recovered from his surgery, vision normalized and energy back to normal.  Continue follow-up with endocrinology    3. Essential hypertension  BP Readings from Last 3 Encounters:   05/17/23 110/70   05/01/23 124/80   03/03/23 118/74   Blood pressure at goal and well controlled.  Continue on current medications.  He still has chlorthalidone that he takes as needed for swelling of his legs.    4. CLL (chronic lymphocytic leukemia)  Overall fairly stable.  Continue follow-up with heme-onc.    5. Type 2 diabetes mellitus without complication, without long-term current use of insulin  Hemoglobin A1C   Date Value Ref Range Status   03/27/2023 6.0 (H) <5.7 % Final   01/19/2023 6.4 (H) <5.7 % Final   11/11/2022 6.7 % Final   05/11/2022 6.1 % Final   02/11/2022 6.30 (H) 4.80 - 5.60 % Final   08/11/2021 5.94 (H) 4.80 - 5.60 % Final   03/08/2021 6.49 (H) 4.80 - 5.60 % Final   Overall well  controlled.  Follow-up in 6 months.  Continue on metformin.      6. Obesity (BMI 30.0-34.9)  BMI is >= 30 and <35. (Class 1 Obesity). The following options were offered after discussion;: exercise counseling/recommendations and nutrition counseling/recommendations      Advance Care Planning   ACP discussion was held with the patient during this visit. Patient does not have an advance directive, information provided.         Return in about 6 months (around 11/17/2023) for Recheck.    Future Appointments       Provider Department Center    8/29/2023 9:00 AM Huma Somers APRN [facility] HEMATOLOGY & ONCOLOGY [place] [facility]    9/19/2023 9:00 AM (Arrive by 8:45 AM) Ayde Bell MD [facility] ENDOCRINOLOGY [facility]    11/17/2023 9:00 AM Jasper Zhao MD [facility] INTERNAL MEDICINE [facility]    5/20/2024 9:15 AM Jasper Zhao MD [facility] MEDICINE [facility]          Jasper Zhao MD  Family Medicine  05/17/2023

## 2023-05-24 ENCOUNTER — LAB (OUTPATIENT)
Dept: LAB | Facility: HOSPITAL | Age: 63
End: 2023-05-24
Payer: COMMERCIAL

## 2023-05-24 DIAGNOSIS — D35.2 PITUITARY MACROADENOMA WITH EXTRASELLAR EXTENSION: ICD-10-CM

## 2023-05-24 DIAGNOSIS — E11.9 TYPE 2 DIABETES MELLITUS WITHOUT COMPLICATION, WITHOUT LONG-TERM CURRENT USE OF INSULIN: Chronic | ICD-10-CM

## 2023-05-24 LAB
ALBUMIN SERPL-MCNC: 4.1 G/DL (ref 3.5–5.2)
ALBUMIN UR-MCNC: <1.2 MG/DL
ALBUMIN/GLOB SERPL: 1.8 G/DL
ALP SERPL-CCNC: 53 U/L (ref 39–117)
ALT SERPL W P-5'-P-CCNC: 15 U/L (ref 1–41)
ANION GAP SERPL CALCULATED.3IONS-SCNC: 10 MMOL/L (ref 5–15)
AST SERPL-CCNC: 15 U/L (ref 1–40)
BILIRUB SERPL-MCNC: 0.2 MG/DL (ref 0–1.2)
BUN SERPL-MCNC: 19 MG/DL (ref 8–23)
BUN/CREAT SERPL: 17.3 (ref 7–25)
CALCIUM SPEC-SCNC: 9.2 MG/DL (ref 8.6–10.5)
CHLORIDE SERPL-SCNC: 104 MMOL/L (ref 98–107)
CO2 SERPL-SCNC: 28 MMOL/L (ref 22–29)
CORTIS AM PEAK SERPL-MCNC: 9.6 MCG/DL
CREAT SERPL-MCNC: 1.1 MG/DL (ref 0.76–1.27)
EGFRCR SERPLBLD CKD-EPI 2021: 75 ML/MIN/1.73
GLOBULIN UR ELPH-MCNC: 2.3 GM/DL
GLUCOSE SERPL-MCNC: 131 MG/DL (ref 65–99)
HBA1C MFR BLD: 6.6 % (ref 4.8–5.6)
POTASSIUM SERPL-SCNC: 4.8 MMOL/L (ref 3.5–5.2)
PROT SERPL-MCNC: 6.4 G/DL (ref 6–8.5)
SODIUM SERPL-SCNC: 142 MMOL/L (ref 136–145)
T4 FREE SERPL-MCNC: 1.12 NG/DL (ref 0.93–1.7)
TSH SERPL DL<=0.05 MIU/L-ACNC: 2.13 UIU/ML (ref 0.27–4.2)

## 2023-05-24 PROCEDURE — 84439 ASSAY OF FREE THYROXINE: CPT

## 2023-05-24 PROCEDURE — 80053 COMPREHEN METABOLIC PANEL: CPT

## 2023-05-24 PROCEDURE — 82533 TOTAL CORTISOL: CPT

## 2023-05-24 PROCEDURE — 82024 ASSAY OF ACTH: CPT

## 2023-05-24 PROCEDURE — 83036 HEMOGLOBIN GLYCOSYLATED A1C: CPT

## 2023-05-24 PROCEDURE — 84443 ASSAY THYROID STIM HORMONE: CPT

## 2023-05-24 PROCEDURE — 82043 UR ALBUMIN QUANTITATIVE: CPT

## 2023-05-25 LAB — ACTH PLAS-MCNC: 72.3 PG/ML (ref 7.2–63.3)

## 2023-08-29 ENCOUNTER — OFFICE VISIT (OUTPATIENT)
Dept: ONCOLOGY | Facility: CLINIC | Age: 63
End: 2023-08-29
Payer: COMMERCIAL

## 2023-08-29 ENCOUNTER — LAB (OUTPATIENT)
Dept: LAB | Facility: HOSPITAL | Age: 63
End: 2023-08-29
Payer: COMMERCIAL

## 2023-08-29 VITALS
HEIGHT: 71 IN | RESPIRATION RATE: 18 BRPM | TEMPERATURE: 97.3 F | SYSTOLIC BLOOD PRESSURE: 113 MMHG | OXYGEN SATURATION: 96 % | BODY MASS INDEX: 32.76 KG/M2 | WEIGHT: 234 LBS | HEART RATE: 74 BPM | DIASTOLIC BLOOD PRESSURE: 75 MMHG

## 2023-08-29 DIAGNOSIS — C91.10 CLL (CHRONIC LYMPHOCYTIC LEUKEMIA): ICD-10-CM

## 2023-08-29 DIAGNOSIS — C91.10 CLL (CHRONIC LYMPHOCYTIC LEUKEMIA): Primary | Chronic | ICD-10-CM

## 2023-08-29 LAB
BASOPHILS # BLD AUTO: 0.03 10*3/MM3 (ref 0–0.2)
BASOPHILS NFR BLD AUTO: 0 % (ref 0–1.5)
DEPRECATED RDW RBC AUTO: 47.7 FL (ref 37–54)
EOSINOPHIL # BLD AUTO: 0.21 10*3/MM3 (ref 0–0.4)
EOSINOPHIL NFR BLD AUTO: 0.3 % (ref 0.3–6.2)
ERYTHROCYTE [DISTWIDTH] IN BLOOD BY AUTOMATED COUNT: 13.9 % (ref 12.3–15.4)
HCT VFR BLD AUTO: 37.6 % (ref 37.5–51)
HGB BLD-MCNC: 12.6 G/DL (ref 13–17.7)
IMM GRANULOCYTES # BLD AUTO: 0.08 10*3/MM3 (ref 0–0.05)
IMM GRANULOCYTES NFR BLD AUTO: 0.1 % (ref 0–0.5)
LYMPHOCYTES # BLD AUTO: 67.42 10*3/MM3 (ref 0.7–3.1)
LYMPHOCYTES NFR BLD AUTO: 88.9 % (ref 19.6–45.3)
MCH RBC QN AUTO: 31.5 PG (ref 26.6–33)
MCHC RBC AUTO-ENTMCNC: 33.5 G/DL (ref 31.5–35.7)
MCV RBC AUTO: 94 FL (ref 79–97)
MONOCYTES # BLD AUTO: 3.68 10*3/MM3 (ref 0.1–0.9)
MONOCYTES NFR BLD AUTO: 4.9 % (ref 5–12)
NEUTROPHILS NFR BLD AUTO: 4.45 10*3/MM3 (ref 1.7–7)
NEUTROPHILS NFR BLD AUTO: 5.8 % (ref 42.7–76)
PLATELET # BLD AUTO: 122 10*3/MM3 (ref 140–450)
PMV BLD AUTO: 9.4 FL (ref 6–12)
RBC # BLD AUTO: 4 10*6/MM3 (ref 4.14–5.8)
WBC NRBC COR # BLD: 75.87 10*3/MM3 (ref 3.4–10.8)

## 2023-08-29 PROCEDURE — 99213 OFFICE O/P EST LOW 20 MIN: CPT | Performed by: NURSE PRACTITIONER

## 2023-08-29 PROCEDURE — 36415 COLL VENOUS BLD VENIPUNCTURE: CPT

## 2023-08-29 PROCEDURE — 85025 COMPLETE CBC W/AUTO DIFF WBC: CPT

## 2023-08-29 NOTE — PROGRESS NOTES
CHIEF COMPLAINT: No new somatic complaints    Problem List:  Oncology/Hematology History Overview Note   1.  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  Tinnitus  3.  Fungal nail infection  4.  Osteoarthritis  5.  Sleep apnea on CPAP  6.  History of right inguinal herniorrhaphy  7.  Diverticulosis  8.  Hypertension  9.  Status post transsphenoidal hypophysectomy in for nonfunctional pituitary macroadenoma with lateral field cuts improved postop    Hematology history timeline:  -3/8/2021 white count 39,560 with hemoglobin 14.1, platelets 138,000, 81% lymphocytes.  Peripheral smear shows smudge cells with atypical lymphocytes with enlarged nuclei with inconspicuous nucleoli.  An adequate volume for flow cytometry on initial blood draw.  C-reactive protein less than 0.3.  PSA normal 3.69 glucose 119 otherwise unremarkable CMP.      -3/19/2021 [facility] hematology oncology initial consultation: We will get peripheral blood flow cytometry flow cytometric hematolymphoid neoplasia assessment including ZAP70/CD38, cytogenetics, B-cell rearrangement, immunoglobulin variable heavy chain and p53 mutational analysis sequencing will be done, as well as snip MicroArray for CLL.  Absolute neutrophil count is normal at 4350 with absolute lymphocyte count 32,040.  Without frequent infections I will not check quantitative immunoglobulins.  CT scans are not necessary for diagnosis, surveillance, routine monitoring of treatment response, or progression.  CT scans may be warranted if symptoms of bulky disease or the assessment of risk for tumor lysis syndrome prior to initiation of venetoclax might be considered.  We will check lactate dehydrogenase, beta-2 microglobulin and uric acid.  Lymphocytosis itself is not an indication for treatment.  NCCN guideline indications for treatment include:  Fatigue severe  Night sweats  Unexplained weight  loss  Fever without infection  Threatened endorgan function due to bulk  Spleen greater than 6 cm below costal margin  Lymph nodes greater than 10 cm  Progressive nonhemolytic anemia hemoglobin less than 10 (hemolytic anemia treated with steroids)  Progressive nonimmune mediated destruction thrombocytopenia platelets less than 100,000 (ITP)  Steroid refractory cytopenias    If these thresholds are reached, the molecular mutational analysis I am doing will guide the specific therapy I recommend.  Presently he does not have any of those clinical thresholds.  He does have swollen MCP and PIP joints with brothers who have psoriatic arthritis and I will check his sedimentation rate, C-reactive protein, rheumatoid factor, and extractable nuclear antigens as well and ultimately may need rheumatological referral.  Rheumatologic diseases can cause some lymphocytosis including monoclonal lymphocytic process but this would be higher lymphocyte count than I would expect from such processes.    -3/19/2021 data:  White count 36,900, hemoglobin 14.2, platelets 154,000, absolute neutrophil count 7900, absolute lymphocyte count 26,500.  CMP glucose 112, bicarb 32, otherwise unremarkable with creatinine 1.19 and normal liver enzymes.  Magnesium 2.1  Hemolytic panel: MARIS negative.  .  Uric acid normal 6.8.  ELLIE negative.  Rheumatoid factor negative.  proBNP normal 6.1  Sedimentation rate less than 1 with C-reactive protein 0.38.  Beta-2 microglobulin 3.5 upper limit 2.2  Flow cytometry: Clonal CD5 positive B-cell population 70% of analyzed cells consistent with CLL/SLL  ZAP70 positive/CD 38+: Double positive considered unfavorable and tends to correlate with immunoglobulin variable heavy chain unmutated status.  Immunoglobulin variable heavy chain somatic hyper mutation analysis did not yield interpretable results.  Cancer cytogenetic analysis showed 47, XY, +12 in all 20 metaphases cells.  CLL FISH panel positive for trisomy  12 which is detected in 20% of B-cell CLL which is associated with an intermediate-poor prognosis  B-cell immunoglobulin heavy and kappa light chain gene rearrangements detected corroborating of B-cell neoplasm.  T p53 mutation analysis negative by DNA sequencing.    -4/9/2021 [facility], [place] medical oncology follow-up visit: I reviewed the above molecular data with the patient.  The ZAP70 and CD38 and trisomy 12 positivity put him at an intermediate to high risk of progression I will watch him fairly closely.  I will repeat his CBC and CMP and physical exam in 2 months.  Thresholds for therapy as outlined above.  Ultimately what we choose to treat at that point depends on the COVID-19 pandemic status as much as his disease markers but he has not p53 mutated so we have a broader range of options.    -6/10/2021 [facility] [place] hematology follow-up visit: 6/4/2021 data reveals white count 43,850 with hemoglobin 12.8 platelets 147,000.  Absolute neutrophil count normal 4820.  No bulky adenopathy.  We will repeat labs again in 2 months.  Indications/thresholds for therapy for CLL as indicated above have not been reached.    -9/10/2021 [facility] [place] medical oncology follow-up visit: White count today is 52,700.  Hemoglobin is 13.9 with platelets 196,000 and no bulky adenopathy, fevers, chills, unexplained weight loss, effusions, bed drenching night sweats.  Hence has not reached NCCN guideline indications for treatment as outlined above.  We will see him back in 3 months with monthly CBC in the interim.  He has not had doubling of his lymphocyte count in less than 6 months either.  Recommended Covid vaccination with booster.    -12/20/2021 [facility] [place] Oncology clinic follow-up:  Continues to do well on surveillance, white count stable currently at 59,100, absolute lymphocytes 51.42, no anemia or thrombocytopenia.  He has no lymphadenopathy or other symptoms suggestive of disease progression.  We will continue with monthly CBC.  We will see him  back in 3 months for follow-up.  If counts remain stable at that time we may be able to extend the time frame of checking his CBC out a little bit.  Indications for treatment per NCCN guidelines are listed above note dated 3/19/2021.    -3/21/2022 [facility] medical oncology follow-up visit: Clinically he is doing well with no new somatic complaints.  His white count is actually lower over the last 3 months now 53,930 with a hemoglobin of 12.4 and platelets 166,000 with absolute neutrophil count quite adequate at 2700 and no frequent infections.  We will check his CBC in 3 months and again just prior to return in 6 months given stability of counts over time with no significant rise in white count and no triggers for need of treatment.    -9/30/2022 [facility] Oncology/Hematology clinic follow-up: Mr. Walsh continues to do well, still no indication for treatment.  CBC is stable with WBC 62,590, mild anemia with hemoglobin of 12.5, hematocrit 37.2%, MCV normal at 96.6, platelet count normal at 149,000.  He has no new worrisome symptoms.  No lymphadenopathy.  We will continue with observation, we will repeat CBC every 3 months and I have ordered that today.  We will see him back in 6 months for follow-up.    - 2/21/2023 follow-up Dr.Saini MORRIS ENT.  He had transsphenoidal hypophysis ectomy with right nasoseptal flap 1/18/2023.  Nasal congestion improved.  No watery drainage.  Back on CPAP.  Slow improvement of smell.  Had large sellar mass.  Found on evaluation by Dr. Bell endocrinology at [facility].  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  Postop MRI showed possible residual near cavernous sinus Per Sammy review of postop.  Improvement in visual field cuts.  Following with Dr. Bell.  Follow-up with Dr. Christianson with cerebrovascular, skull-based, and endovascular neurosurgical group at  mid-March.    -2/24/2023 [facility] medical oncology  hematology follow-up:White count today virtually identical to September 2022. White count 62,360.  Hemoglobin slightly lower at 10.9 but with normochromic normocytic indices and platelets normal 159,000 with absolute lymphocyte count 54,540 and normal absolute neutrophil count 5160.  No bulky palpable cervical axillary or inguinal adenopathy.  No frequent infections.  No hint of this progressing CLL.  We will repeat his labs on return in 6 months.     CLL (chronic lymphocytic leukemia)   3/19/2021 Initial Diagnosis    CLL (chronic lymphocytic leukemia) (CMS/[facility])     4/9/2021 Cancer Staged    Staging form: Chronic Lymphocytic Leukemia / Small Lymphocytic Lymphoma, AJCC 8th Edition  - Clinical: Modified Mina Stage 0 (Modified Mina risk: Low, Lymphocytosis: Present, Adenopathy: Absent, Organomegaly: Absent, Anemia: Absent, Thrombocytopenia: Absent) - Signed by Levi Saunders MD on 4/9/2021         HISTORY OF PRESENT ILLNESS:  The patient is a 62 y.o. male, here for follow up on management of CLL.  Mr. Walsh reports he has been feeling well since we saw him last.  He denies any change in his health, no illnesses or hospitalizations.  No lymphadenopathy that he is aware of.  His appetite is normal, weight is stable.  No change in his bowel or bladder habits.    Past Medical History:   Diagnosis Date    Cataract     Bilateral    Diverticulosis     Essential hypertension 03/2021    3/21 started Lisinopril    Lymphocytosis 03/08/2021    3/2021. WBC 39.56. Abs lymphocyte 38. Plt 138. Hgb 14.1. Referral to hematology. CLL?    Nonrheumatic mitral valve regurgitation 03/03/2021    Systolic murmur 4/6 noted 3/2021. TTE EF 56 - 60%. Mild to moderate mitral valve regurgitation, eccentric-anteriorly directed    Osteoarthritis of knee     Prediabetes 03/08/2021    3/2021 A1c 6.49    Presbycusis of both ears 04/09/2021    Pseudopolyp of sigmoid colon without complication 04/09/2021    Right retinal detachment     2/2022     "Sensorineural hearing loss (SNHL) of both ears 03/17/2021    3/2021 ENT. Also tinnitus. Recommended hearing aids    Sleep apnea     On CPAP consistently    Suprasellar mass 1/9/2023    Suprasellar mass on MRI 1/5/2023 - possible macroadenoma. Compression of optic nerve bilat    Tubular adenoma of colon 03/03/2021 4/2021: Tubular adenoma of sigmoid. No high grade.    Type 2 diabetes mellitus without complication, without long-term current use of insulin 11/11/2022 11/2022 A1c 6.7    Vitreous detachment of left eye     2/15/2022.     Past Surgical History:   Procedure Laterality Date    CATARACT EXTRACTION      december 2022    COLONOSCOPY      4/8/21    FINGER GANGLION CYST EXCISION Right     3rd finger in the 90s    INGUINAL HERNIA REPAIR Right     RETINAL DETACHMENT SURGERY      right eye  on 3/18/22       No Known Allergies    Family History and Social History reviewed and changed as necessary    REVIEW OF SYSTEM:   No new somatic complaints    PHYSICAL EXAM:  Well-developed, well-nourished male in no distress  No palpable cervical axillary or inguinal adenopathy      Vitals:    08/29/23 0905   BP: 113/75   Pulse: 74   Resp: 18   Temp: 97.3 øF (36.3 øC)   SpO2: 96%   Weight: 106 kg (234 lb)   Height: 179.1 cm (70.5\")     Vitals:    08/29/23 0905   PainSc: 0-No pain          ECOG score: 0           Vitals reviewed.  Labs reviewed    ECOG: (0) Fully Active - Able to Carry On All Pre-disease Performance Without Restriction    Lab Results   Component Value Date    HGB 12.6 (L) 08/29/2023    HCT 37.6 08/29/2023    MCV 94.0 08/29/2023     (L) 08/29/2023    WBC 75.87 (H) 08/29/2023    NEUTROABS 4.45 08/29/2023    LYMPHSABS 67.42 (H) 08/29/2023    MONOSABS 3.68 (H) 08/29/2023    EOSABS 0.21 08/29/2023    BASOSABS 0.03 08/29/2023       Lab Results   Component Value Date    GLUCOSE 131 (H) 05/24/2023    BUN 19 05/24/2023    CREATININE 1.10 05/24/2023     05/24/2023    K 4.8 05/24/2023     05/24/2023 "    CO2 28.0 05/24/2023    CALCIUM 9.2 05/24/2023    PROTEINTOT 6.4 05/24/2023    ALBUMIN 4.1 05/24/2023    BILITOT 0.2 05/24/2023    ALKPHOS 53 05/24/2023    AST 15 05/24/2023    ALT 15 05/24/2023             ASSESSMENT & PLAN:  1.  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  Tinnitus  3.  Fungal nail infection  4.  Osteoarthritis  5.  Sleep apnea on CPAP  6.  History of right inguinal herniorrhaphy  7.  Diverticulosis  8.  Hypertension  9.  Status post transsphenoidal hypophysectomy in for nonfunctional pituitary macroadenoma with lateral field cuts improved postop    Hematology history timeline:  -3/8/2021 white count 39,560 with hemoglobin 14.1, platelets 138,000, 81% lymphocytes.  Peripheral smear shows smudge cells with atypical lymphocytes with enlarged nuclei with inconspicuous nucleoli.  An adequate volume for flow cytometry on initial blood draw.  C-reactive protein less than 0.3.  PSA normal 3.69 glucose 119 otherwise unremarkable CMP.      -3/19/2021 [facility], [place] operated [facility] hematology oncology initial consultation: We will get peripheral blood flow cytometry flow cytometric hematolymphoid neoplasia assessment including ZAP70/CD38, cytogenetics, B-cell rearrangement, immunoglobulin variable heavy chain and p53 mutational analysis sequencing will be done, as well as snip MicroArray for CLL.  Absolute neutrophil count is normal at 4350 with absolute lymphocyte count 32,040.  Without frequent infections I will not check quantitative immunoglobulins.  CT scans are not necessary for diagnosis, surveillance, routine monitoring of treatment response, or progression.  CT scans may be warranted if symptoms of bulky disease or the assessment of risk for tumor lysis syndrome prior to initiation of venetoclax might be considered.  We will check lactate dehydrogenase, beta-2 microglobulin and uric acid.  Lymphocytosis itself is not an indication  for treatment.  NCCN guideline indications for treatment include:  Fatigue severe  Night sweats  Unexplained weight loss  Fever without infection  Threatened endorgan function due to bulk  Spleen greater than 6 cm below costal margin  Lymph nodes greater than 10 cm  Progressive nonhemolytic anemia hemoglobin less than 10 (hemolytic anemia treated with steroids)  Progressive nonimmune mediated destruction thrombocytopenia platelets less than 100,000 (ITP)  Steroid refractory cytopenias    If these thresholds are reached, the molecular mutational analysis I am doing will guide the specific therapy I recommend.  Presently he does not have any of those clinical thresholds.  He does have swollen MCP and PIP joints with brothers who have psoriatic arthritis and I will check his sedimentation rate, C-reactive protein, rheumatoid factor, and extractable nuclear antigens as well and ultimately may need rheumatological referral.  Rheumatologic diseases can cause some lymphocytosis including monoclonal lymphocytic process but this would be higher lymphocyte count than I would expect from such processes.    -3/19/2021 data:  White count 36,900, hemoglobin 14.2, platelets 154,000, absolute neutrophil count 7900, absolute lymphocyte count 26,500.  CMP glucose 112, bicarb 32, otherwise unremarkable with creatinine 1.19 and normal liver enzymes.  Magnesium 2.1  Hemolytic panel: MARIS negative.  .  Uric acid normal 6.8.  ELLIE negative.  Rheumatoid factor negative.  proBNP normal 6.1  Sedimentation rate less than 1 with C-reactive protein 0.38.  Beta-2 microglobulin 3.5 upper limit 2.2  Flow cytometry: Clonal CD5 positive B-cell population 70% of analyzed cells consistent with CLL/SLL  ZAP70 positive/CD 38+: Double positive considered unfavorable and tends to correlate with immunoglobulin variable heavy chain unmutated status.  Immunoglobulin variable heavy chain somatic hyper mutation analysis did not yield interpretable  results.  Cancer cytogenetic analysis showed 47, XY, +12 in all 20 metaphases cells.  CLL FISH panel positive for trisomy 12 which is detected in 20% of B-cell CLL which is associated with an intermediate-poor prognosis  B-cell immunoglobulin heavy and kappa light chain gene rearrangements detected corroborating of B-cell neoplasm.  T p53 mutation analysis negative by DNA sequencing.    -4/9/2021 [facility] medical oncology follow-up visit: I reviewed the above molecular data with the patient.  The ZAP70 and CD38 and trisomy 12 positivity put him at an intermediate to high risk of progression I will watch him fairly closely.  I will repeat his CBC and CMP and physical exam in 2 months.  Thresholds for therapy as outlined above.  Ultimately what we choose to treat at that point depends on the COVID-19 pandemic status as much as his disease markers but he has not p53 mutated so we have a broader range of options.    -6/10/2021 [facility] hematology follow-up visit: 6/4/2021 data reveals white count 43,850 with hemoglobin 12.8 platelets 147,000.  Absolute neutrophil count normal 4820.  No bulky adenopathy.  We will repeat labs again in 2 months.  Indications/thresholds for therapy for CLL as indicated above have not been reached.    -9/10/2021 [facility] medical oncology follow-up visit: White count today is 52,700.  Hemoglobin is 13.9 with platelets 196,000 and no bulky adenopathy, fevers, chills, unexplained weight loss, effusions, bed drenching night sweats.  Hence has not reached NCCN guideline indications for treatment as outlined above.  We will see him back in 3 months with monthly CBC in the interim.  He has not had doubling of his lymphocyte count in less than 6 months either.  Recommended Covid vaccination with booster.    -12/20/2021 [facility] Oncology clinic follow-up:  Continues to do well on surveillance, white count stable currently at 59,100, absolute lymphocytes 51.42, no anemia or thrombocytopenia.  He has no  lymphadenopathy or other symptoms suggestive of disease progression.  We will continue with monthly CBC.  We will see him back in 3 months for follow-up.  If counts remain stable at that time we may be able to extend the time frame of checking his CBC out a little bit.  Indications for treatment per NCCN guidelines are listed above note dated 3/19/2021.    -3/21/2022 [facility] medical oncology follow-up visit: Clinically he is doing well with no new somatic complaints.  His white count is actually lower over the last 3 months now 53,930 with a hemoglobin of 12.4 and platelets 166,000 with absolute neutrophil count quite adequate at 2700 and no frequent infections.  We will check his CBC in 3 months and again just prior to return in 6 months given stability of counts over time with no significant rise in white count and no triggers for need of treatment.    -9/30/2022 [facility] Oncology/Hematology clinic follow-up: Mr. Walsh continues to do well, still no indication for treatment.  CBC is stable with WBC 62,590, mild anemia with hemoglobin of 12.5, hematocrit 37.2%, MCV normal at 96.6, platelet count normal at 149,000.  He has no new worrisome symptoms.  No lymphadenopathy.  We will continue with observation, we will repeat CBC every 3 months and I have ordered that today.  We will see him back in 6 months for follow-up.    - 2/21/2023 follow-up  [facility].  He had transsphenoidal hypophysis ectomy with right nasoseptal flap 1/18/2023.  Nasal congestion improved.  No watery drainage.  Back on CPAP.  Slow improvement of smell.  Had large sellar mass.  Found on evaluation by Dr. Bell endocrinology at [facility].  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  Postop MRI showed possible residual near cavernous sinus Per Sammy review of postop.  Improvement in visual field cuts.  Following with Dr. Bell.  Follow-up with Dr. Christianson with  cerebrovascular, skull-based, and endovascular neurosurgical group at  mid-March.    -2/24/2023 [facility] [place], [facility] medical oncology hematology follow-up:White count today virtually identical to September 2022. White count 62,360.  Hemoglobin slightly lower at 10.9 but with normochromic normocytic indices and platelets normal 159,000 with absolute lymphocyte count 54,540 and normal absolute neutrophil count 5160.  No bulky palpable cervical axillary or inguinal adenopathy.  No frequent infections.  No hint of this progressing CLL.  We will repeat his labs on return in 6 months.    -8/29/2023 [facility] [place] Hematology/Oncology clinic follow-up: Mr. Walsh is doing well, he has no new worrisome symptoms, his CBC is stable as shown above.  WBC slightly higher at 75,870, hemoglobin is good at 12.6 with hematocrit 37.6%, platelets 122,000, absolute lymphocytes 67.42.  He has had no doubling of his counts.  He has no adenopathy, no frequent infections, no unusual fatigue.  We will continue monitoring CBC, I will check it again in 3 months since his platelets dropped somewhat but they are still well above 100,000, will also check on return in 6 months.    Return to clinic in 6 months for follow-up    I spent 20 minutes caring for Sigifredo on this date of service. This time includes time spent by me in the following activities: preparing for the visit, reviewing tests, performing a medically appropriate examination and/or evaluation, ordering medications, tests, or procedures, documenting information in the medical record, and independently interpreting results and communicating that information with the patient/family/caregiver.     Huma Somers, ROSA    08/29/2023

## 2023-09-07 DIAGNOSIS — E11.9 TYPE 2 DIABETES MELLITUS WITHOUT COMPLICATION, WITHOUT LONG-TERM CURRENT USE OF INSULIN: Chronic | ICD-10-CM

## 2023-09-07 DIAGNOSIS — I10 ESSENTIAL HYPERTENSION: ICD-10-CM

## 2023-09-08 RX ORDER — LISINOPRIL 10 MG/1
TABLET ORAL
Qty: 90 TABLET | Refills: 0 | Status: SHIPPED | OUTPATIENT
Start: 2023-09-08

## 2023-09-19 ENCOUNTER — OFFICE VISIT (OUTPATIENT)
Dept: ENDOCRINOLOGY | Facility: CLINIC | Age: 63
End: 2023-09-19
Payer: COMMERCIAL

## 2023-09-19 ENCOUNTER — TELEPHONE (OUTPATIENT)
Dept: ENDOCRINOLOGY | Facility: CLINIC | Age: 63
End: 2023-09-19

## 2023-09-19 VITALS
HEART RATE: 65 BPM | BODY MASS INDEX: 32.76 KG/M2 | DIASTOLIC BLOOD PRESSURE: 64 MMHG | OXYGEN SATURATION: 98 % | WEIGHT: 234 LBS | HEIGHT: 71 IN | SYSTOLIC BLOOD PRESSURE: 130 MMHG

## 2023-09-19 DIAGNOSIS — D35.2 PITUITARY MACROADENOMA WITH EXTRASELLAR EXTENSION: Primary | ICD-10-CM

## 2023-09-19 LAB
CORTIS AM PEAK SERPL-MCNC: 7.47 MCG/DL
FSH SERPL-ACNC: 3.65 MIU/ML
LH SERPL-ACNC: 4.25 MIU/ML
T4 FREE SERPL-MCNC: 1.07 NG/DL (ref 0.93–1.7)
TSH SERPL DL<=0.05 MIU/L-ACNC: 1.63 UIU/ML (ref 0.27–4.2)

## 2023-09-19 PROCEDURE — 84443 ASSAY THYROID STIM HORMONE: CPT | Performed by: INTERNAL MEDICINE

## 2023-09-19 PROCEDURE — 99213 OFFICE O/P EST LOW 20 MIN: CPT | Performed by: INTERNAL MEDICINE

## 2023-09-19 PROCEDURE — 83001 ASSAY OF GONADOTROPIN (FSH): CPT | Performed by: INTERNAL MEDICINE

## 2023-09-19 PROCEDURE — 82024 ASSAY OF ACTH: CPT | Performed by: INTERNAL MEDICINE

## 2023-09-19 PROCEDURE — 84439 ASSAY OF FREE THYROXINE: CPT | Performed by: INTERNAL MEDICINE

## 2023-09-19 PROCEDURE — 82533 TOTAL CORTISOL: CPT | Performed by: INTERNAL MEDICINE

## 2023-09-19 PROCEDURE — 83002 ASSAY OF GONADOTROPIN (LH): CPT | Performed by: INTERNAL MEDICINE

## 2023-09-19 PROCEDURE — 36415 COLL VENOUS BLD VENIPUNCTURE: CPT | Performed by: INTERNAL MEDICINE

## 2023-09-19 NOTE — TELEPHONE ENCOUNTER
----- Message from Ayde Bell MD sent at 9/19/2023  9:37 AM EDT -----  Regarding: outside records  Please obtain  both last clinic note and visual field testing report  from [facility] (phone [phone])   Thanks  md

## 2023-09-19 NOTE — PROGRESS NOTES
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  Last visit 05/01/2023. His history is as follows:    Pt is accompanied by his wife today.    Interim Events:  - Reports overall feeling well.  - denies increased thirst. Urinating overnight intermittently but at his baseline before surgery  - denies increased urination during the day  - Denies HA's  - Reports improvement in his peripheral vision. Had visual field testing 08/09/2023 which was normal.  - Occasional fatigue after increased physical activity  - Had f/u with NS on 09/01/2023    1) pituitary macroadenoma with extrasellar extension:  - Patient developed decreased vision in early 2022 after left eye vitreous detachment. He vision did not improve over time which was thought to be due to a cataract. After undergoing left eye cataract surgery in December 2022, he continued to have decreased vision.  This was further evaluated by his ophthalmologist with an MRI of the brain and orbits with and without contrast on 01/5/2023 at [facility].  The report described a 2.8 cm (AP) x 3.5 cm (mediolateral) x 3.3 cm (CC) suprasellar mass with \" marked impingement upon the optic chiasm and nerves, asymmetrically worse on the left.\"   -Patient was evaluated by his PCP on 01/11/2023 and the following labs were collected:  (01/11/2023 at 10:36 AM)   LH 5.23, FSH 5.77  TSH 0.771, no free T4 collected  Prolactin 41.20 - Stalk effect  10:30AM cortisol 8.29, no ACTH collected    - (01/17/2023) Initial Endocrine Visit:  Lab evaluation (collected 12:40pm) - no medications initiated  ACTH 59.8 (7.2 - 63.3), Cortisol 5.24, IGF-1 77 (64 - 240), Free T4 0.79 (0.93 - 1.70)    - (01/18/2023) s/p endoscopic transsphenoidal resection of his pituitary tumor by " "Dr. Donnie Christianson at [facility]. No complications post-operatively.  Post-op labs:  (01/18/2023) Surgical Pathology: \"pituitary adenoma\"  (01/19/2023) POD#1 - Na 139  (01/20/2023) POD#2 - Na 137, 5AM cortisol 29.2    - (03/03/2023) - CMP, urine Osm, CMP, AM ACTH, AM Cortisol, prolactin, FSH, LH, TSH, free T4 levels checked and showed no deficits    -  (04/10/2023) Is s/p gamma knife radiosurgery at [facility]     Recent Imaging:  (09/01/2023, [facility]) MRI \"Unchanged since the previous examination. Residual enhancing soft tissue in the floor of the sella, posteriorly, extending into the right greater than left cavernous sinuses. No significant mass-effect on, or stenosis of, either internal carotid artery.\"  (08/09/2023) Eye Max MOW: Visual Field Testing was normal.    Other Medical History:   - Has Type 2 DM and is currently on metformin 500 mg BID  - Has CLL and is followed by heme/onc at   - Has KARRIE and is on CPAP  - Has chronic hearing loss and tinnitus. Wears hearing aids  - Has osteoarthritis  - Is taking chlorthalidone as needed for mild lower extremity edema    Review of Systems   Constitutional:  Positive for fatigue (intermittent).        Weight stable   HENT:  Positive for hearing loss and tinnitus.    Eyes:  Negative for visual disturbance (peripheral vision has improved).   Respiratory: Negative.     Cardiovascular:  Positive for leg swelling (mild, intermittent).   Gastrointestinal: Negative.    Endocrine: Negative.  Negative for polydipsia and polyuria.   Genitourinary: Negative.  Negative for frequency.   Musculoskeletal:  Positive for arthralgias, back pain and myalgias.   Skin: Negative.    Allergic/Immunologic: Negative.    Neurological: Negative.  Negative for dizziness, weakness, light-headedness and headaches.   Hematological: Negative.    Psychiatric/Behavioral: Negative.         The following portions of the patient's history were reviewed and updated as appropriate: allergies, current medications, past " "family history, past medical history, past social history, past surgical history and problem list.      /64   Pulse 65   Ht 179.1 cm (70.5\")   Wt 106 kg (234 lb)   SpO2 98%   BMI 33.10 kg/m²   Physical Exam  Vitals reviewed.   Constitutional:       General: He is not in acute distress.     Appearance: He is well-developed. He is not diaphoretic.   HENT:      Head: Normocephalic.      Mouth/Throat:      Mouth: Mucous membranes are moist.      Pharynx: Oropharynx is clear.   Eyes:      Conjunctiva/sclera: Conjunctivae normal.      Pupils: Pupils are equal, round, and reactive to light.   Neck:      Thyroid: No thyromegaly.      Trachea: No tracheal deviation.      Comments: No palpable thyroid nodules    Cardiovascular:      Rate and Rhythm: Normal rate and regular rhythm.      Heart sounds: Murmur (chronic, MV regurgitation) heard.   Pulmonary:      Effort: Pulmonary effort is normal. No respiratory distress.      Breath sounds: Normal breath sounds.   Abdominal:      General: Bowel sounds are normal.      Palpations: Abdomen is soft. There is no mass.      Tenderness: There is no abdominal tenderness.   Musculoskeletal:      Cervical back: Neck supple.      Right lower leg: No edema.      Left lower leg: No edema.   Lymphadenopathy:      Cervical: No cervical adenopathy.   Skin:     General: Skin is warm and dry.      Findings: No erythema.   Neurological:      Mental Status: He is alert and oriented to person, place, and time.      Comments: No visual deficits in left upper and lower quadrants on VFT.   Psychiatric:         Behavior: Behavior normal.       LABS/IMAGING: outside records reviewed and summarized in HPI  Results for orders placed or performed in visit on 09/19/23   Cortisol - AM    Specimen: Arm, Left; Blood   Result Value Ref Range    Cortisol - AM 7.47 mcg/dL   ACTH    Specimen: Blood   Result Value Ref Range    ACTH 54.3 7.2 - 63.3 pg/mL   Luteinizing Hormone    Specimen: Blood   Result " Value Ref Range    LH 4.25 mIU/mL   Follicle Stimulating Hormone    Specimen: Blood   Result Value Ref Range    FSH 3.65 mIU/mL   TSH    Specimen: Blood   Result Value Ref Range    TSH 1.630 0.270 - 4.200 uIU/mL   T4, Free    Specimen: Blood   Result Value Ref Range    Free T4 1.07 0.93 - 1.70 ng/dL     Lab Results   Component Value Date    GLUCOSE 131 (H) 05/24/2023    BUN 19 05/24/2023    CREATININE 1.10 05/24/2023    EGFR 75.0 05/24/2023    BCR 17.3 05/24/2023    K 4.8 05/24/2023    CO2 28.0 05/24/2023    CALCIUM 9.2 05/24/2023    ALBUMIN 4.1 05/24/2023    BILITOT 0.2 05/24/2023    AST 15 05/24/2023    ALT 15 05/24/2023       ASSESSMENT/PLAN:  1) pituitary macroadenoma with extrasellar extension s/p TSR on 01/18/2023, s/p GKRS on 04/10/2023:   - Pt is clinically doing well post-operatively.  His peripheral vision has improved    - Reviewed risks for pituitary deficiencies after gamma knife   - CMP, urine Osm, CMP, AM ACTH, AM Cortisol, prolactin, FSH, LH, TSH, free T4 levels checked 03/2023 showed no deficits  - No clinical or biochemical evidence of secondary adrenal insufficiency, secondary hypothyroidism, or secondary hypogonadism at this time. Reviewed potential symptoms related to these deficiencies and advised pt to contact me if he develops any concerning symptoms  - No clinical evidence of diabetes insipidus. Advised pt that it is okay to take chlorthalidone PRN.  - Will continue to monitor pituitary function tests as indiacted  - Recent VFT was normal (08/2023)  - AM cortisol, ACTH, TSH, free T4, LH, FSH were normal today   - Has f/u imaging with [facility] in one year    RTC 12 months    Signed: Ayde Bell MD

## 2023-09-20 LAB — ACTH PLAS-MCNC: 54.3 PG/ML (ref 7.2–63.3)

## 2023-09-28 DIAGNOSIS — E11.9 TYPE 2 DIABETES MELLITUS WITHOUT COMPLICATION, WITHOUT LONG-TERM CURRENT USE OF INSULIN: Chronic | ICD-10-CM

## 2023-09-28 NOTE — TELEPHONE ENCOUNTER
Rx Refill Note  Requested Prescriptions     Pending Prescriptions Disp Refills    metFORMIN (GLUCOPHAGE) 500 MG tablet [Pharmacy Med Name: metFORMIN HCl 500 MG Oral Tablet] 60 tablet 0     Sig: TAKE 1 TABLET BY MOUTH TWICE DAILY WITH MEALS      Last office visit with prescribing clinician: 5/17/2023   Last telemedicine visit with prescribing clinician: Visit date not found   Next office visit with prescribing clinician: 11/17/2023                         Would you like a call back once the refill request has been completed: [] Yes [] No    If the office needs to give you a call back, can they leave a voicemail: [] Yes [] No    Jessica Juan RN  09/28/23, 12:25 EDT

## 2023-10-24 ENCOUNTER — OFFICE VISIT (OUTPATIENT)
Dept: INTERNAL MEDICINE | Facility: CLINIC | Age: 63
End: 2023-10-24
Payer: COMMERCIAL

## 2023-10-24 VITALS
SYSTOLIC BLOOD PRESSURE: 140 MMHG | DIASTOLIC BLOOD PRESSURE: 92 MMHG | TEMPERATURE: 98.2 F | HEART RATE: 76 BPM | HEIGHT: 71 IN | WEIGHT: 230.2 LBS | BODY MASS INDEX: 32.23 KG/M2

## 2023-10-24 DIAGNOSIS — E11.9 TYPE 2 DIABETES MELLITUS WITHOUT COMPLICATION, WITHOUT LONG-TERM CURRENT USE OF INSULIN: Chronic | ICD-10-CM

## 2023-10-24 DIAGNOSIS — J03.90 ACUTE TONSILLITIS, UNSPECIFIED ETIOLOGY: Primary | ICD-10-CM

## 2023-10-24 DIAGNOSIS — I10 ESSENTIAL HYPERTENSION: Chronic | ICD-10-CM

## 2023-10-24 DIAGNOSIS — C91.10 CLL (CHRONIC LYMPHOCYTIC LEUKEMIA): Chronic | ICD-10-CM

## 2023-10-24 LAB
EXPIRATION DATE: ABNORMAL
EXPIRATION DATE: NORMAL
HBA1C MFR BLD: 7.2 % (ref 4.5–5.7)
INTERNAL CONTROL: NORMAL
Lab: ABNORMAL
Lab: NORMAL
S PYO AG THROAT QL: NEGATIVE

## 2023-10-24 RX ORDER — CETIRIZINE HYDROCHLORIDE 10 MG/1
10 TABLET ORAL AS NEEDED
COMMUNITY

## 2023-10-24 RX ORDER — AMOXICILLIN AND CLAVULANATE POTASSIUM 875; 125 MG/1; MG/1
1 TABLET, FILM COATED ORAL 2 TIMES DAILY
Qty: 20 TABLET | Refills: 0 | Status: SHIPPED | OUTPATIENT
Start: 2023-10-24 | End: 2023-11-03

## 2023-10-24 NOTE — PROGRESS NOTES
"Chief Complaint  Sigifredo Walsh is a 63 y.o. male presenting for Allergies (X's 2 weeks).     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 Saunders, [facility]) and mitral valve regurgitation.     No nasal swabs, tubes or testing placed in patient's nose.  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    History of Present Illness  Patient is here accompanied by his wife.  He had Mohs surgery of right ear on 10/11/2023, the next day he had lesion removed of his chest, both shown to be BCCs.    Over the last 2 weeks he has been experiencing sore throat, worse on the left side, also some swelling of the left neck.  It hurts to swallow.  Also some cough, nasal congestion with clear postnasal drainage.  He was thinking this could be partly related to his allergies.    The following portions of the patient's history were reviewed and updated as appropriate: allergies, current medications, past family history, past medical history, past social history, past surgical history, and problem list.    Objective  /92 (BP Location: Left arm, Patient Position: Sitting, Cuff Size: Adult)   Pulse 76   Temp 98.2 °F (36.8 °C) (Temporal)   Ht 179.1 cm (70.51\")   Wt 104 kg (230 lb 3.2 oz)   BMI 32.55 kg/m²     Physical Exam  Vitals reviewed.   Constitutional:       Appearance: Normal appearance.   HENT:      Head: Normocephalic and atraumatic.      Right Ear: Tympanic membrane, ear canal and external ear normal. There is no impacted cerumen.      Left Ear: Tympanic membrane, ear canal and external ear normal. There is no impacted cerumen.      Nose: Nose normal. No congestion.      Mouth/Throat:      Mouth: Mucous membranes are moist.      Pharynx: Posterior " oropharyngeal erythema present.      Comments: Enlarged left tonsil with surrounding erythema.  No trismus.  Eyes:      Extraocular Movements: Extraocular movements intact.      Conjunctiva/sclera: Conjunctivae normal.   Neck:      Comments: Tender lymphadenopathy on left side.  Cardiovascular:      Rate and Rhythm: Normal rate and regular rhythm.      Heart sounds: Normal heart sounds. No murmur heard.  Pulmonary:      Effort: Pulmonary effort is normal. No respiratory distress.      Breath sounds: Normal breath sounds. No wheezing.   Abdominal:      Palpations: Abdomen is soft.   Musculoskeletal:      Cervical back: Neck supple. Tenderness present.   Lymphadenopathy:      Cervical: Cervical adenopathy present.   Skin:     General: Skin is warm and dry.   Neurological:      Mental Status: He is alert and oriented to person, place, and time. Mental status is at baseline.   Psychiatric:         Behavior: Behavior normal.         Thought Content: Thought content normal.         Assessment/Plan   1. Acute tonsillitis, unspecified etiology  2. CLL (chronic lymphocytic leukemia)  Symptoms suggestive of possible viral infection, but he does have tender lymphadenopathy of the left neck, also erythema and enlarged left tonsil.  He has decreased immunity.  Despite negative strep test I recommend treating for possible bacterial tonsillitis.  If any worsening symptoms he should get back in touch, otherwise this will hopefully take care of this infection.  - POC Rapid Strep A  - amoxicillin-clavulanate (AUGMENTIN) 875-125 MG per tablet; Take 1 tablet by mouth 2 (Two) Times a Day for 10 days.  Dispense: 20 tablet; Refill: 0    3. Type 2 diabetes mellitus without complication, without long-term current use of insulin  Hemoglobin A1C   Date Value Ref Range Status   10/24/2023 7.2 (A) 4.5 - 5.7 % Final   05/24/2023 6.60 (H) 4.80 - 5.60 % Final   03/27/2023 6.0 (H) <5.7 % Final   01/19/2023 6.4 (H) <5.7 % Final   11/11/2022 6.7 %  Final   05/11/2022 6.1 % Final   02/11/2022 6.30 (H) 4.80 - 5.60 % Final   08/11/2021 5.94 (H) 4.80 - 5.60 % Final   Diabetes is not well controlled.  Patient tells me he has been forgetting to take afternoon doses.  He will try to get on top of this to improve his sugars  - POC Glycosylated Hemoglobin (Hb A1C)    4. Essential hypertension  BP Readings from Last 3 Encounters:   10/24/23 140/92   09/19/23 130/64   08/29/23 113/75   He has been missing doses.  Also ill at the moment, so we will recheck his blood pressure on follow-up in November.      Return if symptoms worsen or fail to improve, for Next scheduled follow up.    Future Appointments         Provider Department Center    11/17/2023 9:00 AM Jasper Zhao MD [facility] INTERNAL MEDICINE [facility]    2/29/2024 9:15 AM (Arrive by 9:00 AM) Levi Saunders MD [facility] HEMATOLOGY & ONCOLOGY [facility]    5/20/2024 9:15 AM Jasper Zhao MD [facility] INTERNAL MEDICINE [facility]    9/16/2024 8:30 AM (Arrive by 8:15 AM) Ayde Bell MD [facility] ENDOCRINOLOGY [facility]            Jasper Zhao MD  Family Medicine  10/24/2023

## 2023-11-17 ENCOUNTER — OFFICE VISIT (OUTPATIENT)
Dept: INTERNAL MEDICINE | Facility: CLINIC | Age: 63
End: 2023-11-17
Payer: COMMERCIAL

## 2023-11-17 VITALS
HEART RATE: 64 BPM | DIASTOLIC BLOOD PRESSURE: 64 MMHG | SYSTOLIC BLOOD PRESSURE: 104 MMHG | WEIGHT: 236.6 LBS | HEIGHT: 71 IN | BODY MASS INDEX: 33.12 KG/M2 | TEMPERATURE: 97.8 F

## 2023-11-17 DIAGNOSIS — I10 ESSENTIAL HYPERTENSION: Chronic | ICD-10-CM

## 2023-11-17 DIAGNOSIS — M25.50 ARTHRALGIA, UNSPECIFIED JOINT: ICD-10-CM

## 2023-11-17 DIAGNOSIS — E11.9 TYPE 2 DIABETES MELLITUS WITHOUT COMPLICATION, WITHOUT LONG-TERM CURRENT USE OF INSULIN: Primary | Chronic | ICD-10-CM

## 2023-11-17 NOTE — PROGRESS NOTES
"Chief Complaint  Sigifredo Walsh is a 63 y.o. male presenting for Diabetes.     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 Saunders, [facility]onc [facility]) and mitral valve regurgitation.     No nasal swabs, tubes or testing placed in patient's nose.  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    History of Present Illness  Patient is here accompanied by his wife for follow-up.    He continues to follow-up with endocrinology for his pituitary macroadenoma.  He is still having significant congestion, but got better after he was on antibiotics recently.  He continues to follow-up with ENT and does saline irrigations.    Patient tells me still misses p.m. metformin at times.  He is currently on 500 mg twice daily.    Patient reports having 2 brothers with psoriatic arthritis.  Patient never had psoriasis of his skin, but is reporting worsening pain now both hands, including wrists, fingers, also knees.  He is requesting referral to Dr. Gutierrez with rheumatology for evaluation with concern for possible psoriatic arthritis.    The following portions of the patient's history were reviewed and updated as appropriate: allergies, current medications, past family history, past medical history, past social history, past surgical history, and problem list.    Objective  /64 (BP Location: Left arm, Patient Position: Sitting, Cuff Size: Adult)   Pulse 64   Temp 97.8 °F (36.6 °C) (Temporal)   Ht 179.1 cm (70.51\")   Wt 107 kg (236 lb 9.6 oz)   BMI 33.46 kg/m²     Physical Exam  Constitutional:       Appearance: Normal appearance.   HENT:      Head: Normocephalic and atraumatic.      Nose: Congestion present.   Eyes:      Extraocular Movements: Extraocular movements " intact.      Conjunctiva/sclera: Conjunctivae normal.   Pulmonary:      Effort: Pulmonary effort is normal. No respiratory distress.   Musculoskeletal:      Cervical back: Neck supple.   Neurological:      Mental Status: He is alert and oriented to person, place, and time. Mental status is at baseline.   Psychiatric:         Behavior: Behavior normal.         Thought Content: Thought content normal.         Assessment/Plan   1. Type 2 diabetes mellitus without complication, without long-term current use of insulin  Hemoglobin A1C   Date Value Ref Range Status   10/24/2023 7.2 (A) 4.5 - 5.7 % Final   05/24/2023 6.60 (H) 4.80 - 5.60 % Final   03/27/2023 6.0 (H) <5.7 % Final   01/19/2023 6.4 (H) <5.7 % Final   11/11/2022 6.7 % Final   05/11/2022 6.1 % Final   02/11/2022 6.30 (H) 4.80 - 5.60 % Final   08/11/2021 5.94 (H) 4.80 - 5.60 % Final   Worsening glycemic control.  He has been missing doses of metformin, so he will try that take it twice daily as ordered.  We could also transition to extended release 1000 mg in the morning, but he wants to hold off for now.    2. Essential hypertension  BP Readings from Last 3 Encounters:   11/17/23 104/64   10/24/23 140/92   09/19/23 130/64   Blood pressure improved, now at goal.  We will continue on current medications    3. Arthralgia, unspecified joint  Refer to rheumatology for evaluation.  - Ambulatory Referral to Rheumatology      Return in about 13 weeks (around 2/16/2024).    Future Appointments         Provider Department Center    2/22/2024 9:15 AM Jasper Zhao MD [facility] INTERNAL MEDICINE [facility]    2/29/2024 9:15 AM (Arrive by 9:00 AM) Levi Saunders MD [facility] HEMATOLOGY & ONCOLOGY [facility]    5/20/2024 9:15 AM Jasper Zhao MD [facility] INTERNAL MEDICINE [facility]    9/16/2024 8:30 AM (Arrive by 8:15 AM) Ayde Bell MD [facility] ENDOCRINOLOGY [facility]              MD Magnolia Scott  Medicine  11/17/2023

## 2024-01-18 DIAGNOSIS — I10 ESSENTIAL HYPERTENSION: ICD-10-CM

## 2024-01-18 RX ORDER — LISINOPRIL 10 MG/1
TABLET ORAL
Qty: 30 TABLET | Refills: 0 | Status: SHIPPED | OUTPATIENT
Start: 2024-01-18

## 2024-01-18 RX ORDER — CHLORTHALIDONE 25 MG/1
25 TABLET ORAL DAILY
Qty: 30 TABLET | Refills: 0 | Status: SHIPPED | OUTPATIENT
Start: 2024-01-18

## 2024-02-22 ENCOUNTER — OFFICE VISIT (OUTPATIENT)
Dept: INTERNAL MEDICINE | Facility: CLINIC | Age: 64
End: 2024-02-22
Payer: COMMERCIAL

## 2024-02-22 VITALS
SYSTOLIC BLOOD PRESSURE: 102 MMHG | BODY MASS INDEX: 32.81 KG/M2 | TEMPERATURE: 98.7 F | WEIGHT: 234.4 LBS | DIASTOLIC BLOOD PRESSURE: 72 MMHG | HEIGHT: 71 IN | HEART RATE: 60 BPM

## 2024-02-22 DIAGNOSIS — I10 ESSENTIAL HYPERTENSION: Chronic | ICD-10-CM

## 2024-02-22 DIAGNOSIS — E11.9 TYPE 2 DIABETES MELLITUS WITHOUT COMPLICATION, WITHOUT LONG-TERM CURRENT USE OF INSULIN: Primary | Chronic | ICD-10-CM

## 2024-02-22 DIAGNOSIS — J30.1 SEASONAL ALLERGIC RHINITIS DUE TO POLLEN: Chronic | ICD-10-CM

## 2024-02-22 DIAGNOSIS — C91.10 CLL (CHRONIC LYMPHOCYTIC LEUKEMIA): Chronic | ICD-10-CM

## 2024-02-22 DIAGNOSIS — H61.22 IMPACTED CERUMEN OF LEFT EAR: ICD-10-CM

## 2024-02-22 LAB
EXPIRATION DATE: ABNORMAL
HBA1C MFR BLD: 6.6 % (ref 4.5–5.7)
Lab: ABNORMAL

## 2024-02-22 NOTE — PROGRESS NOTES
"Chief Complaint  Sigifredo Walsh is a 63 y.o. male presenting for Diabetes and Eustation tube clogged.     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 Saunders, [facility]onc [facility]) and mitral valve regurgitation.     No nasal swabs, tubes or testing placed in patient's nose.  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    History of Present Illness  Patient is here for follow-up.    He has been taking metformin more frequently, not missing the evening dose often.    He also feels his allergies have gotten worse recently.  He is more congested, runny nose.  He cannot use nasal steroid due to his DuraSeal.  He has been taking Zyrtec, but not adequate relief.  He also has felt left ear being clogged, he has tried to irrigate and he uses Q-tips occasionally, he has also used eardrops to soften the earwax.    The following portions of the patient's history were reviewed and updated as appropriate: allergies, current medications, past family history, past medical history, past social history, past surgical history, and problem list.    Objective  /72 (BP Location: Left arm, Patient Position: Sitting, Cuff Size: Adult)   Pulse 60   Temp 98.7 °F (37.1 °C) (Temporal)   Ht 179.1 cm (70.51\")   Wt 106 kg (234 lb 6.4 oz)   BMI 33.15 kg/m²     Physical Exam  Vitals reviewed.   Constitutional:       Appearance: Normal appearance.   HENT:      Head: Normocephalic and atraumatic.      Right Ear: Tympanic membrane, ear canal and external ear normal. There is no impacted cerumen.      Left Ear: External ear normal. There is impacted cerumen.      Nose: Nose normal. No congestion.      Mouth/Throat:      Mouth: Mucous membranes are moist.   Eyes:      Extraocular " Movements: Extraocular movements intact.      Conjunctiva/sclera: Conjunctivae normal.   Cardiovascular:      Rate and Rhythm: Normal rate and regular rhythm.      Heart sounds: Murmur heard.   Pulmonary:      Effort: Pulmonary effort is normal. No respiratory distress.      Breath sounds: Normal breath sounds. No wheezing.   Musculoskeletal:      Cervical back: Neck supple.   Skin:     General: Skin is warm and dry.   Neurological:      Mental Status: He is alert and oriented to person, place, and time. Mental status is at baseline.   Psychiatric:         Behavior: Behavior normal.         Thought Content: Thought content normal.         Assessment/Plan   1. Type 2 diabetes mellitus without complication, without long-term current use of insulin  Hemoglobin A1C   Date Value Ref Range Status   02/22/2024 6.6 (A) 4.5 - 5.7 % Final   10/24/2023 7.2 (A) 4.5 - 5.7 % Final   05/24/2023 6.60 (H) 4.80 - 5.60 % Final   03/27/2023 6.0 (H) <5.7 % Final   01/19/2023 6.4 (H) <5.7 % Final   11/11/2022 6.7 % Final   02/11/2022 6.30 (H) 4.80 - 5.60 % Final   08/11/2021 5.94 (H) 4.80 - 5.60 % Final   Improved glycemic control, diabetes not well-controlled.  Will continue to monitor on current medications.  Follow-up in 3 months.  - POC Glycosylated Hemoglobin (Hb A1C)    2. Impacted cerumen of left ear  Left ear irrigated.  Significant improvement of hearing afterwards.  On inspection there is no sign of perforation of the TM.  No significant redness of the ear canal.  There is still small amounts of cerumen left close to the eardrum.  For now I do not recommend any more irrigation, and he can use eardrops like sweet all, mineral, Debrox or olive oil, which can help soften the earwax.  I do not recommend use of Q-tips that tend to push the earwax deeper into the ear.  - Ear Cerumen Removal    3. Essential hypertension  BP Readings from Last 3 Encounters:   02/22/24 102/72   11/17/23 104/64   10/24/23 140/92   Blood pressure overall  well-controlled, borderline low.  He is asymptomatic.  Continue on current medications.    4. CLL (chronic lymphocytic leukemia)  Stable and doing well.  Has follow-up with Dr. Saunders next month.    5. Seasonal allergic rhinitis due to pollen  May be contributing to his congestion.  We did discuss potentially trial of montelukast/Singulair, which can help relieve his allergy symptoms in addition to Zyrtec.  Singulair has potential side effects for mental health including depression, but most patients will not have any significant side effects.  Also he cannot use nasal steroid due to his DuraSeal, so Singulair might be a better option.  He can continue saline nasal spray.  If he would like to try Singulair he can get back in touch.      Return in about 13 weeks (around 5/23/2024) for Recheck.    Future Appointments         Provider Department Center    2/29/2024 9:15 AM (Arrive by 9:00 AM) Levi Saunders MD [facility] HEMATOLOGY & ONCOLOGY [facility]    5/20/2024 9:15 AM Jasper Zhao MD [facility] INTERNAL MEDICINE [facility]    5/23/2024 9:15 AM Jasper Zhao MD [facility] INTERNAL MEDICINE [facility]    9/16/2024 8:30 AM (Arrive by 8:15 AM) Ayde Bell MD [facility] ENDOCRINOLOGY [facility]              Jasper Zhao MD  Family Medicine  02/22/2024

## 2024-02-22 NOTE — PROGRESS NOTES
Ear Cerumen Removal    Date/Time: 2/22/2024 10:28 AM    Performed by: Jasper Zhao MD  Authorized by: Jasper Zhao MD    Anesthesia:  Local Anesthetic: none  Location details: left ear  Patient tolerance: patient tolerated the procedure well with no immediate complications  Procedure type: irrigation   Sedation:  Patient sedated: no

## 2024-02-28 ENCOUNTER — TELEPHONE (OUTPATIENT)
Dept: ONCOLOGY | Facility: CLINIC | Age: 64
End: 2024-02-28
Payer: COMMERCIAL

## 2024-02-28 ENCOUNTER — LAB (OUTPATIENT)
Dept: LAB | Facility: HOSPITAL | Age: 64
End: 2024-02-28
Payer: COMMERCIAL

## 2024-02-28 DIAGNOSIS — I10 ESSENTIAL HYPERTENSION: ICD-10-CM

## 2024-02-28 DIAGNOSIS — C91.10 CLL (CHRONIC LYMPHOCYTIC LEUKEMIA): ICD-10-CM

## 2024-02-28 LAB
BASOPHILS # BLD AUTO: 0.12 10*3/MM3 (ref 0–0.2)
BASOPHILS NFR BLD AUTO: 0.1 % (ref 0–1.5)
DEPRECATED RDW RBC AUTO: 50.5 FL (ref 37–54)
EOSINOPHIL # BLD AUTO: 0.14 10*3/MM3 (ref 0–0.4)
EOSINOPHIL NFR BLD AUTO: 0.2 % (ref 0.3–6.2)
ERYTHROCYTE [DISTWIDTH] IN BLOOD BY AUTOMATED COUNT: 14.4 % (ref 12.3–15.4)
HCT VFR BLD AUTO: 37 % (ref 37.5–51)
HGB BLD-MCNC: 11.6 G/DL (ref 13–17.7)
IMM GRANULOCYTES # BLD AUTO: 0.14 10*3/MM3 (ref 0–0.05)
IMM GRANULOCYTES NFR BLD AUTO: 0.2 % (ref 0–0.5)
LYMPHOCYTES # BLD AUTO: 74.82 10*3/MM3 (ref 0.7–3.1)
LYMPHOCYTES NFR BLD AUTO: 89.5 % (ref 19.6–45.3)
MCH RBC QN AUTO: 30.2 PG (ref 26.6–33)
MCHC RBC AUTO-ENTMCNC: 31.4 G/DL (ref 31.5–35.7)
MCV RBC AUTO: 96.4 FL (ref 79–97)
MONOCYTES # BLD AUTO: 4.43 10*3/MM3 (ref 0.1–0.9)
MONOCYTES NFR BLD AUTO: 5.3 % (ref 5–12)
NEUTROPHILS NFR BLD AUTO: 3.97 10*3/MM3 (ref 1.7–7)
NEUTROPHILS NFR BLD AUTO: 4.7 % (ref 42.7–76)
NRBC BLD AUTO-RTO: 0 /100 WBC (ref 0–0.2)
PLATELET # BLD AUTO: 111 10*3/MM3 (ref 140–450)
PMV BLD AUTO: 10.4 FL (ref 6–12)
RBC # BLD AUTO: 3.84 10*6/MM3 (ref 4.14–5.8)
RBC MORPH BLD: NORMAL
SMALL PLATELETS BLD QL SMEAR: ADEQUATE
WBC MORPH BLD: NORMAL
WBC NRBC COR # BLD AUTO: 83.62 10*3/MM3 (ref 3.4–10.8)

## 2024-02-28 PROCEDURE — 85007 BL SMEAR W/DIFF WBC COUNT: CPT

## 2024-02-28 PROCEDURE — 85025 COMPLETE CBC W/AUTO DIFF WBC: CPT

## 2024-02-28 PROCEDURE — 36415 COLL VENOUS BLD VENIPUNCTURE: CPT

## 2024-02-28 NOTE — TELEPHONE ENCOUNTER
----- Message from Talita Hernandez RN sent at 2/28/2024  1:53 PM EST -----  Regarding: critical lab      Critical Test Results      MD: Nicky    Date: 02/28/2024     Critical test result: WBC 83.62    Time results received: 154 pm

## 2024-02-28 NOTE — TELEPHONE ENCOUNTER
Name of Physician notified: Huma Somers, APRN    Time Physician Notified: [id number]      []  Orders received    []  Protocol/Standing orders followed    [x]  No new orders

## 2024-02-29 ENCOUNTER — OFFICE VISIT (OUTPATIENT)
Dept: ONCOLOGY | Facility: CLINIC | Age: 64
End: 2024-02-29
Payer: COMMERCIAL

## 2024-02-29 VITALS
BODY MASS INDEX: 33.46 KG/M2 | WEIGHT: 239 LBS | HEART RATE: 64 BPM | TEMPERATURE: 97.6 F | HEIGHT: 71 IN | OXYGEN SATURATION: 96 % | SYSTOLIC BLOOD PRESSURE: 148 MMHG | RESPIRATION RATE: 18 BRPM | DIASTOLIC BLOOD PRESSURE: 83 MMHG

## 2024-02-29 DIAGNOSIS — C91.10 CLL (CHRONIC LYMPHOCYTIC LEUKEMIA): Primary | Chronic | ICD-10-CM

## 2024-02-29 PROCEDURE — 99214 OFFICE O/P EST MOD 30 MIN: CPT | Performed by: INTERNAL MEDICINE

## 2024-02-29 RX ORDER — LISINOPRIL 10 MG/1
TABLET ORAL
Qty: 30 TABLET | Refills: 0 | Status: SHIPPED | OUTPATIENT
Start: 2024-02-29

## 2024-02-29 RX ORDER — CHLORTHALIDONE 25 MG/1
25 TABLET ORAL DAILY
Qty: 30 TABLET | Refills: 0 | Status: SHIPPED | OUTPATIENT
Start: 2024-02-29

## 2024-02-29 NOTE — LETTER
February 29, 2024       No Recipients    Patient: Sigifredo Walsh   YOB: 1960   Date of Visit: 2/29/2024     Dear Jasper Zhao MD:       Thank you for referring Sigifredo Walsh to me for evaluation. Below are the relevant portions of my assessment and plan of care.    If you have questions, please do not hesitate to call me. I look forward to following Sigifredo along with you.         Sincerely,        Levi Saunders MD        CC:   No Recipients    Levi Saunders MD  02/29/24 0919  Sign when Signing Visit  CHIEF COMPLAINT: No new somatic complaints    Problem List:  Oncology/Hematology History Overview Note   1.  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  Tinnitus  3.  Fungal nail infection  4.  Osteoarthritis  5.  Sleep apnea on CPAP  6.  History of right inguinal herniorrhaphy  7.  Diverticulosis  8.  Hypertension  9.  Status post transsphenoidal hypophysectomy in for nonfunctional pituitary macroadenoma with lateral field cuts improved postop    Hematology history timeline:  -3/8/2021 white count 39,560 with hemoglobin 14.1, platelets 138,000, 81% lymphocytes.  Peripheral smear shows smudge cells with atypical lymphocytes with enlarged nuclei with inconspicuous nucleoli.  An adequate volume for flow cytometry on initial blood draw.  C-reactive protein less than 0.3.  PSA normal 3.69 glucose 119 otherwise unremarkable CMP.      -3/19/2021 [facility] [place] hematology oncology initial consultation: We will get peripheral blood flow cytometry flow cytometric hematolymphoid neoplasia assessment including ZAP70/CD38, cytogenetics, B-cell rearrangement, immunoglobulin variable heavy chain and p53 mutational analysis sequencing will be done, as well as snip MicroArray for CLL.  Absolute neutrophil count is normal at 4350 with absolute lymphocyte count 32,040.  Without frequent infections I will not check quantitative  immunoglobulins.  CT scans are not necessary for diagnosis, surveillance, routine monitoring of treatment response, or progression.  CT scans may be warranted if symptoms of bulky disease or the assessment of risk for tumor lysis syndrome prior to initiation of venetoclax might be considered.  We will check lactate dehydrogenase, beta-2 microglobulin and uric acid.  Lymphocytosis itself is not an indication for treatment.  NCCN guideline indications for treatment include:  Fatigue severe  Night sweats  Unexplained weight loss  Fever without infection  Threatened endorgan function due to bulk  Spleen greater than 6 cm below costal margin  Lymph nodes greater than 10 cm  Progressive nonhemolytic anemia hemoglobin less than 10 (hemolytic anemia treated with steroids)  Progressive nonimmune mediated destruction thrombocytopenia platelets less than 100,000 (ITP)  Steroid refractory cytopenias    If these thresholds are reached, the molecular mutational analysis I am doing will guide the specific therapy I recommend.  Presently he does not have any of those clinical thresholds.  He does have swollen MCP and PIP joints with brothers who have psoriatic arthritis and I will check his sedimentation rate, C-reactive protein, rheumatoid factor, and extractable nuclear antigens as well and ultimately may need rheumatological referral.  Rheumatologic diseases can cause some lymphocytosis including monoclonal lymphocytic process but this would be higher lymphocyte count than I would expect from such processes.    -3/19/2021 data:  White count 36,900, hemoglobin 14.2, platelets 154,000, absolute neutrophil count 7900, absolute lymphocyte count 26,500.  CMP glucose 112, bicarb 32, otherwise unremarkable with creatinine 1.19 and normal liver enzymes.  Magnesium 2.1  Hemolytic panel: MARIS negative.  .  Uric acid normal 6.8.  ELLIE negative.  Rheumatoid factor negative.  proBNP normal 6.1  Sedimentation rate less than 1 with  C-reactive protein 0.38.  Beta-2 microglobulin 3.5 upper limit 2.2  Flow cytometry: Clonal CD5 positive B-cell population 70% of analyzed cells consistent with CLL/SLL  ZAP70 positive/CD 38+: Double positive considered unfavorable and tends to correlate with immunoglobulin variable heavy chain unmutated status.  Immunoglobulin variable heavy chain somatic hyper mutation analysis did not yield interpretable results.  Cancer cytogenetic analysis showed 47, XY, +12 in all 20 metaphases cells.  CLL FISH panel positive for trisomy 12 which is detected in 20% of B-cell CLL which is associated with an intermediate-poor prognosis  B-cell immunoglobulin heavy and kappa light chain gene rearrangements detected corroborating of B-cell neoplasm.  T p53 mutation analysis negative by DNA sequencing.    -4/9/2021 [facility] medical oncology follow-up visit: I reviewed the above molecular data with the patient.  The ZAP70 and CD38 and trisomy 12 positivity put him at an intermediate to high risk of progression I will watch him fairly closely.  I will repeat his CBC and CMP and physical exam in 2 months.  Thresholds for therapy as outlined above.  Ultimately what we choose to treat at that point depends on the COVID-19 pandemic status as much as his disease markers but he has not p53 mutated so we have a broader range of options.    -6/10/2021 [facility] hematology follow-up visit: 6/4/2021 data reveals white count 43,850 with hemoglobin 12.8 platelets 147,000.  Absolute neutrophil count normal 4820.  No bulky adenopathy.  We will repeat labs again in 2 months.  Indications/thresholds for therapy for CLL as indicated above have not been reached.    -9/10/2021 [facility] medical oncology follow-up visit: White count today is 52,700.  Hemoglobin is 13.9 with platelets 196,000 and no bulky adenopathy, fevers, chills, unexplained weight loss, effusions, bed drenching night sweats.  Hence has not reached NCCN guideline indications for treatment  as outlined above.  We will see him back in 3 months with monthly CBC in the interim.  He has not had doubling of his lymphocyte count in less than 6 months either.  Recommended Covid vaccination with booster.    -12/20/2021 [facility] Oncology clinic follow-up:  Continues to do well on surveillance, white count stable currently at 59,100, absolute lymphocytes 51.42, no anemia or thrombocytopenia.  He has no lymphadenopathy or other symptoms suggestive of disease progression.  We will continue with monthly CBC.  We will see him back in 3 months for follow-up.  If counts remain stable at that time we may be able to extend the time frame of checking his CBC out a little bit.  Indications for treatment per NCCN guidelines are listed above note dated 3/19/2021.    -3/21/2022 [facility] medical oncology follow-up visit: Clinically he is doing well with no new somatic complaints.  His white count is actually lower over the last 3 months now 53,930 with a hemoglobin of 12.4 and platelets 166,000 with absolute neutrophil count quite adequate at 2700 and no frequent infections.  We will check his CBC in 3 months and again just prior to return in 6 months given stability of counts over time with no significant rise in white count and no triggers for need of treatment.    -9/30/2022 [facility] Oncology/Hematology clinic follow-up: Mr. Walsh continues to do well, still no indication for treatment.  CBC is stable with WBC 62,590, mild anemia with hemoglobin of 12.5, hematocrit 37.2%, MCV normal at 96.6, platelet count normal at 149,000.  He has no new worrisome symptoms.  No lymphadenopathy.  We will continue with observation, we will repeat CBC every 3 months and I have ordered that today.  We will see him back in 6 months for follow-up.    - 2/21/2023 follow-up Dr.Saini MORRIS ENT.  He had transsphenoidal hypophysis ectomy with right nasoseptal flap 1/18/2023.  Nasal congestion improved.  No watery drainage.  Back on CPAP.  Slow  improvement of smell.  Had large sellar mass.  Found on evaluation by Dr. Bell endocrinology at [facility].  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  Postop MRI showed possible residual near cavernous sinus Per Sammy review of postop.  Improvement in visual field cuts.  Following with Dr. Bell.  Follow-up with Dr. Christianson with cerebrovascular, skull-based, and endovascular neurosurgical group at  mid-March.    -2/24/2023 [facility] medical oncology hematology follow-up:White count today virtually identical to September 2022… White count 62,360.  Hemoglobin slightly lower at 10.9 but with normochromic normocytic indices and platelets normal 159,000 with absolute lymphocyte count 54,540 and normal absolute neutrophil count 5160.  No bulky palpable cervical axillary or inguinal adenopathy.  No frequent infections.  No hint of this progressing CLL.  We will repeat his labs on return in 6 months.    -8/29/2023 [facility] Hematology/Oncology clinic follow-up: Mr. Walsh is doing well, he has no new worrisome symptoms, his CBC is stable as shown above.  WBC slightly higher at 75,870, hemoglobin is good at 12.6 with hematocrit 37.6%, platelets 122,000, absolute lymphocytes 67.42.  He has had no doubling of his counts.  He has no adenopathy, no frequent infections, no unusual fatigue.  We will continue monitoring CBC, I will check it again in 3 months since his platelets dropped somewhat but they are still well above 100,000, will also check on return in 6 months.    -2/29/2024 [facility] hematology oncology follow-up: He did not have CBC done since last we saw him.White count is 83,620 with hemoglobin 11.6 fairly stable over time.  Platelets slightly lower at 111,000.  Absolute lymphocyte count 74,820 up from 54,000 a year ago hence lymphocyte count doubling time is not 6 months or less and he still has no bulky adenopathy on exam or hemoglobin less  than 10 hemolytic in nature or platelet count less than 100,000 not due to ITP.  No unexplained fatigue or frequent infections or unexplained B symptoms will repeat CBC in 6 months with no current indication for treatment.     CLL (chronic lymphocytic leukemia)   3/19/2021 Initial Diagnosis    CLL (chronic lymphocytic leukemia) (CMS/HCC)     4/9/2021 Cancer Staged    Staging form: Chronic Lymphocytic Leukemia / Small Lymphocytic Lymphoma, AJCC 8th Edition  - Clinical: Modified Mina Stage 0 (Modified Mina risk: Low, Lymphocytosis: Present, Adenopathy: Absent, Organomegaly: Absent, Anemia: Absent, Thrombocytopenia: Absent) - Signed by Levi Saunders MD on 4/9/2021         HISTORY OF PRESENT ILLNESS:  The patient is a 63 y.o. male, here for follow up on management of asymptomatic CLL    Past Medical History:   Diagnosis Date   • Cancer CLL   • Cataract     Bilateral   • Diverticulosis    • Essential hypertension 03/2021    3/21 started Lisinopril   • Lymphocytosis 03/08/2021    3/2021. WBC 39.56. Abs lymphocyte 38. Plt 138. Hgb 14.1. Referral to hematology. CLL?   • Nonrheumatic mitral valve regurgitation 03/03/2021    Systolic murmur 4/6 noted 3/2021. TTE EF 56 - 60%. Mild to moderate mitral valve regurgitation, eccentric-anteriorly directed   • Osteoarthritis of knee    • Prediabetes 03/08/2021    3/2021 A1c 6.49   • Presbycusis of both ears 04/09/2021   • Pseudopolyp of sigmoid colon without complication 04/09/2021   • Right retinal detachment     2/2022   • Seasonal allergic rhinitis due to pollen 02/22/2024   • Sensorineural hearing loss (SNHL) of both ears 03/17/2021    3/2021 ENT. Also tinnitus. Recommended hearing aids   • Sleep apnea     On CPAP consistently   • Suprasellar mass 01/09/2023    Suprasellar mass on MRI 1/5/2023 - possible macroadenoma. Compression of optic nerve bilat   • Tubular adenoma of colon 03/03/2021 4/2021: Tubular adenoma of sigmoid. No high grade.   • Type 2 diabetes mellitus without  "complication, without long-term current use of insulin 11/11/2022 11/2022 A1c 6.7   • Vitreous detachment of left eye     2/15/2022.     Past Surgical History:   Procedure Laterality Date   • CATARACT EXTRACTION      december 2022   • COLONOSCOPY      4/8/21   • FINGER GANGLION CYST EXCISION Right     3rd finger in the 90s   • INGUINAL HERNIA REPAIR Right    • RETINAL DETACHMENT SURGERY      right eye  on 3/18/22       No Known Allergies    Family History and Social History reviewed and changed as necessary    REVIEW OF SYSTEM:   No B symptoms.  No adenopathy.  No rashes    PHYSICAL EXAM:  No jaundice icterus or pallor.  No petechiae or ecchymoses.  No palpable cervical axillary or inguinal adenopathy.  No hepatosplenomegaly.  No respiratory distress.    Vitals:    02/29/24 0902   BP: 148/83   Pulse: 64   Resp: 18   Temp: 97.6 °F (36.4 °C)   SpO2: 96%   Weight: 108 kg (239 lb)   Height: 179.1 cm (70.5\")     Vitals:    02/29/24 0902   PainSc: 0-No pain  Comment: +tooth ache          ECOG score: 0           Vitals reviewed.    ECOG: (0) Fully Active - Able to Carry On All Pre-disease Performance Without Restriction    Lab Results   Component Value Date    HGB 11.6 (L) 02/28/2024    HCT 37.0 (L) 02/28/2024    MCV 96.4 02/28/2024     (L) 02/28/2024    WBC 83.62 (C) 02/28/2024    NEUTROABS 3.97 02/28/2024    LYMPHSABS 74.82 (H) 02/28/2024    MONOSABS 4.43 (H) 02/28/2024    EOSABS 0.14 02/28/2024    BASOSABS 0.12 02/28/2024       Lab Results   Component Value Date    GLUCOSE 131 (H) 05/24/2023    BUN 19 05/24/2023    CREATININE 1.10 05/24/2023     05/24/2023    K 4.8 05/24/2023     05/24/2023    CO2 28.0 05/24/2023    CALCIUM 9.2 05/24/2023    PROTEINTOT 6.4 05/24/2023    ALBUMIN 4.1 05/24/2023    BILITOT 0.2 05/24/2023    ALKPHOS 53 05/24/2023    AST 15 05/24/2023    ALT 15 05/24/2023             ASSESSMENT & PLAN:  1.  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  Tinnitus  3.  Fungal nail infection  4.  Osteoarthritis  5.  Sleep apnea on CPAP  6.  History of right inguinal herniorrhaphy  7.  Diverticulosis  8.  Hypertension  9.  Status post transsphenoidal hypophysectomy in for nonfunctional pituitary macroadenoma with lateral field cuts improved postop    Hematology history timeline:  -3/8/2021 white count 39,560 with hemoglobin 14.1, platelets 138,000, 81% lymphocytes.  Peripheral smear shows smudge cells with atypical lymphocytes with enlarged nuclei with inconspicuous nucleoli.  An adequate volume for flow cytometry on initial blood draw.  C-reactive protein less than 0.3.  PSA normal 3.69 glucose 119 otherwise unremarkable CMP.      -3/19/2021 [facility] hematology oncology initial consultation: We will get peripheral blood flow cytometry flow cytometric hematolymphoid neoplasia assessment including ZAP70/CD38, cytogenetics, B-cell rearrangement, immunoglobulin variable heavy chain and p53 mutational analysis sequencing will be done, as well as snip MicroArray for CLL.  Absolute neutrophil count is normal at 4350 with absolute lymphocyte count 32,040.  Without frequent infections I will not check quantitative immunoglobulins.  CT scans are not necessary for diagnosis, surveillance, routine monitoring of treatment response, or progression.  CT scans may be warranted if symptoms of bulky disease or the assessment of risk for tumor lysis syndrome prior to initiation of venetoclax might be considered.  We will check lactate dehydrogenase, beta-2 microglobulin and uric acid.  Lymphocytosis itself is not an indication for treatment.  NCCN guideline indications for treatment include:  Fatigue severe  Night sweats  Unexplained weight loss  Fever without infection  Threatened endorgan function due to bulk  Spleen greater than 6 cm below costal margin  Lymph nodes greater than 10 cm  Progressive nonhemolytic anemia hemoglobin  less than 10 (hemolytic anemia treated with steroids)  Progressive nonimmune mediated destruction thrombocytopenia platelets less than 100,000 (ITP)  Steroid refractory cytopenias    If these thresholds are reached, the molecular mutational analysis I am doing will guide the specific therapy I recommend.  Presently he does not have any of those clinical thresholds.  He does have swollen MCP and PIP joints with brothers who have psoriatic arthritis and I will check his sedimentation rate, C-reactive protein, rheumatoid factor, and extractable nuclear antigens as well and ultimately may need rheumatological referral.  Rheumatologic diseases can cause some lymphocytosis including monoclonal lymphocytic process but this would be higher lymphocyte count than I would expect from such processes.    -3/19/2021 data:  White count 36,900, hemoglobin 14.2, platelets 154,000, absolute neutrophil count 7900, absolute lymphocyte count 26,500.  CMP glucose 112, bicarb 32, otherwise unremarkable with creatinine 1.19 and normal liver enzymes.  Magnesium 2.1  Hemolytic panel: MARIS negative.  .  Uric acid normal 6.8.  ELLIE negative.  Rheumatoid factor negative.  proBNP normal 6.1  Sedimentation rate less than 1 with C-reactive protein 0.38.  Beta-2 microglobulin 3.5 upper limit 2.2  Flow cytometry: Clonal CD5 positive B-cell population 70% of analyzed cells consistent with CLL/SLL  ZAP70 positive/CD 38+: Double positive considered unfavorable and tends to correlate with immunoglobulin variable heavy chain unmutated status.  Immunoglobulin variable heavy chain somatic hyper mutation analysis did not yield interpretable results.  Cancer cytogenetic analysis showed 47, XY, +12 in all 20 metaphases cells.  CLL FISH panel positive for trisomy 12 which is detected in 20% of B-cell CLL which is associated with an intermediate-poor prognosis  B-cell immunoglobulin heavy and kappa light chain gene rearrangements detected corroborating of  B-cell neoplasm.  T p53 mutation analysis negative by DNA sequencing.    -4/9/2021 [facility] medical oncology follow-up visit: I reviewed the above molecular data with the patient.  The ZAP70 and CD38 and trisomy 12 positivity put him at an intermediate to high risk of progression I will watch him fairly closely.  I will repeat his CBC and CMP and physical exam in 2 months.  Thresholds for therapy as outlined above.  Ultimately what we choose to treat at that point depends on the COVID-19 pandemic status as much as his disease markers but he has not p53 mutated so we have a broader range of options.    -6/10/2021 [facility] hematology follow-up visit: 6/4/2021 data reveals white count 43,850 with hemoglobin 12.8 platelets 147,000.  Absolute neutrophil count normal 4820.  No bulky adenopathy.  We will repeat labs again in 2 months.  Indications/thresholds for therapy for CLL as indicated above have not been reached.    -9/10/2021 [facility] medical oncology follow-up visit: White count today is 52,700.  Hemoglobin is 13.9 with platelets 196,000 and no bulky adenopathy, fevers, chills, unexplained weight loss, effusions, bed drenching night sweats.  Hence has not reached NCCN guideline indications for treatment as outlined above.  We will see him back in 3 months with monthly CBC in the interim.  He has not had doubling of his lymphocyte count in less than 6 months either.  Recommended Covid vaccination with booster.    -12/20/2021 [facility] Oncology clinic follow-up:  Continues to do well on surveillance, white count stable currently at 59,100, absolute lymphocytes 51.42, no anemia or thrombocytopenia.  He has no lymphadenopathy or other symptoms suggestive of disease progression.  We will continue with monthly CBC.  We will see him back in 3 months for follow-up.  If counts remain stable at that time we may be able to extend the time frame of checking his CBC out a little bit.  Indications for treatment per NCCN guidelines  are listed above note dated 3/19/2021.    -3/21/2022 [facility] medical oncology follow-up visit: Clinically he is doing well with no new somatic complaints.  His white count is actually lower over the last 3 months now 53,930 with a hemoglobin of 12.4 and platelets 166,000 with absolute neutrophil count quite adequate at 2700 and no frequent infections.  We will check his CBC in 3 months and again just prior to return in 6 months given stability of counts over time with no significant rise in white count and no triggers for need of treatment.    -9/30/2022 [facility] Oncology/Hematology clinic follow-up: Mr. Walsh continues to do well, still no indication for treatment.  CBC is stable with WBC 62,590, mild anemia with hemoglobin of 12.5, hematocrit 37.2%, MCV normal at 96.6, platelet count normal at 149,000.  He has no new worrisome symptoms.  No lymphadenopathy.  We will continue with observation, we will repeat CBC every 3 months and I have ordered that today.  We will see him back in 6 months for follow-up.    - 2/21/2023 follow-up Dr.Saini MORRIS ENT.  He had transsphenoidal hypophysis ectomy with right nasoseptal flap 1/18/2023.  Nasal congestion improved.  No watery drainage.  Back on CPAP.  Slow improvement of smell.  Had large sellar mass.  Found on evaluation by Dr. Bell endocrinology at [facility].  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  Postop MRI showed possible residual near cavernous sinus Per Sammy review of postop.  Improvement in visual field cuts.  Following with Dr. Bell.  Follow-up with Dr. Christianson with cerebrovascular, skull-based, and endovascular neurosurgical group at  mid-March.    -2/24/2023 [facility] medical oncology hematology follow-up:White count today virtually identical to September 2022… White count 62,360.  Hemoglobin slightly lower at 10.9 but with normochromic normocytic indices and platelets normal 159,000  with absolute lymphocyte count 54,540 and normal absolute neutrophil count 5160.  No bulky palpable cervical axillary or inguinal adenopathy.  No frequent infections.  No hint of this progressing CLL.  We will repeat his labs on return in 6 months.    -8/29/2023 [facility] Hematology/Oncology clinic follow-up: Mr. Walsh is doing well, he has no new worrisome symptoms, his CBC is stable as shown above.  WBC slightly higher at 75,870, hemoglobin is good at 12.6 with hematocrit 37.6%, platelets 122,000, absolute lymphocytes 67.42.  He has had no doubling of his counts.  He has no adenopathy, no frequent infections, no unusual fatigue.  We will continue monitoring CBC, I will check it again in 3 months since his platelets dropped somewhat but they are still well above 100,000, will also check on return in 6 months.    -2/29/2024 [facility] hematology oncology follow-up: He did not have CBC done since last we saw him.White count is 83,620 with hemoglobin 11.6 fairly stable over time.  Platelets slightly lower at 111,000.  Absolute lymphocyte count 74,820 up from 54,000 a year ago hence lymphocyte count doubling time is not 6 months or less and he still has no bulky adenopathy on exam or hemoglobin less than 10 hemolytic in nature or platelet count less than 100,000 not due to ITP.  No unexplained fatigue or frequent infections or unexplained B symptoms will repeat CBC in 6 months with no current indication for treatment.    Total time of care today inclusive of time spent today prior to patient's arrival reviewing interval data and interviewing him as to signs or symptoms of his disease and follow-up thereof and after visit instituting this plan took 30 minutes of patient care time throughout the day today.  Levi Saunders MD    02/29/2024

## 2024-02-29 NOTE — PROGRESS NOTES
CHIEF COMPLAINT: No new somatic complaints    Problem List:  Oncology/Hematology History Overview Note   1.  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  Tinnitus  3.  Fungal nail infection  4.  Osteoarthritis  5.  Sleep apnea on CPAP  6.  History of right inguinal herniorrhaphy  7.  Diverticulosis  8.  Hypertension  9.  Status post transsphenoidal hypophysectomy in for nonfunctional pituitary macroadenoma with lateral field cuts improved postop    Hematology history timeline:  -3/8/2021 white count 39,560 with hemoglobin 14.1, platelets 138,000, 81% lymphocytes.  Peripheral smear shows smudge cells with atypical lymphocytes with enlarged nuclei with inconspicuous nucleoli.  An adequate volume for flow cytometry on initial blood draw.  C-reactive protein less than 0.3.  PSA normal 3.69 glucose 119 otherwise unremarkable CMP.      -3/19/2021 [facility], [place], operated by [facility] hematology oncology initial consultation: We will get peripheral blood flow cytometry flow cytometric hematolymphoid neoplasia assessment including ZAP70/CD38, cytogenetics, B-cell rearrangement, immunoglobulin variable heavy chain and p53 mutational analysis sequencing will be done, as well as snip MicroArray for CLL.  Absolute neutrophil count is normal at 4350 with absolute lymphocyte count 32,040.  Without frequent infections I will not check quantitative immunoglobulins.  CT scans are not necessary for diagnosis, surveillance, routine monitoring of treatment response, or progression.  CT scans may be warranted if symptoms of bulky disease or the assessment of risk for tumor lysis syndrome prior to initiation of venetoclax might be considered.  We will check lactate dehydrogenase, beta-2 microglobulin and uric acid.  Lymphocytosis itself is not an indication for treatment.  NCCN guideline indications for treatment include:  Fatigue severe  Night sweats  Unexplained weight  loss  Fever without infection  Threatened endorgan function due to bulk  Spleen greater than 6 cm below costal margin  Lymph nodes greater than 10 cm  Progressive nonhemolytic anemia hemoglobin less than 10 (hemolytic anemia treated with steroids)  Progressive nonimmune mediated destruction thrombocytopenia platelets less than 100,000 (ITP)  Steroid refractory cytopenias    If these thresholds are reached, the molecular mutational analysis I am doing will guide the specific therapy I recommend.  Presently he does not have any of those clinical thresholds.  He does have swollen MCP and PIP joints with brothers who have psoriatic arthritis and I will check his sedimentation rate, C-reactive protein, rheumatoid factor, and extractable nuclear antigens as well and ultimately may need rheumatological referral.  Rheumatologic diseases can cause some lymphocytosis including monoclonal lymphocytic process but this would be higher lymphocyte count than I would expect from such processes.    -3/19/2021 data:  White count 36,900, hemoglobin 14.2, platelets 154,000, absolute neutrophil count 7900, absolute lymphocyte count 26,500.  CMP glucose 112, bicarb 32, otherwise unremarkable with creatinine 1.19 and normal liver enzymes.  Magnesium 2.1  Hemolytic panel: MARIS negative.  .  Uric acid normal 6.8.  ELLIE negative.  Rheumatoid factor negative.  proBNP normal 6.1  Sedimentation rate less than 1 with C-reactive protein 0.38.  Beta-2 microglobulin 3.5 upper limit 2.2  Flow cytometry: Clonal CD5 positive B-cell population 70% of analyzed cells consistent with CLL/SLL  ZAP70 positive/CD 38+: Double positive considered unfavorable and tends to correlate with immunoglobulin variable heavy chain unmutated status.  Immunoglobulin variable heavy chain somatic hyper mutation analysis did not yield interpretable results.  Cancer cytogenetic analysis showed 47, XY, +12 in all 20 metaphases cells.  CLL FISH panel positive for trisomy  12 which is detected in 20% of B-cell CLL which is associated with an intermediate-poor prognosis  B-cell immunoglobulin heavy and kappa light chain gene rearrangements detected corroborating of B-cell neoplasm.  T p53 mutation analysis negative by DNA sequencing.    -4/9/2021 [facility] medical oncology follow-up visit: I reviewed the above molecular data with the patient.  The ZAP70 and CD38 and trisomy 12 positivity put him at an intermediate to high risk of progression I will watch him fairly closely.  I will repeat his CBC and CMP and physical exam in 2 months.  Thresholds for therapy as outlined above.  Ultimately what we choose to treat at that point depends on the COVID-19 pandemic status as much as his disease markers but he has not p53 mutated so we have a broader range of options.    -6/10/2021 [facility] hematology follow-up visit: 6/4/2021 data reveals white count 43,850 with hemoglobin 12.8 platelets 147,000.  Absolute neutrophil count normal 4820.  No bulky adenopathy.  We will repeat labs again in 2 months.  Indications/thresholds for therapy for CLL as indicated above have not been reached.    -9/10/2021 [facility] medical oncology follow-up visit: White count today is 52,700.  Hemoglobin is 13.9 with platelets 196,000 and no bulky adenopathy, fevers, chills, unexplained weight loss, effusions, bed drenching night sweats.  Hence has not reached NCCN guideline indications for treatment as outlined above.  We will see him back in 3 months with monthly CBC in the interim.  He has not had doubling of his lymphocyte count in less than 6 months either.  Recommended Covid vaccination with booster.    -12/20/2021 [facility] Oncology clinic follow-up:  Continues to do well on surveillance, white count stable currently at 59,100, absolute lymphocytes 51.42, no anemia or thrombocytopenia.  He has no lymphadenopathy or other symptoms suggestive of disease progression.  We will continue with monthly CBC.  We will see him  back in 3 months for follow-up.  If counts remain stable at that time we may be able to extend the time frame of checking his CBC out a little bit.  Indications for treatment per NCCN guidelines are listed above note dated 3/19/2021.    -3/21/2022 [facility] medical oncology follow-up visit: Clinically he is doing well with no new somatic complaints.  His white count is actually lower over the last 3 months now 53,930 with a hemoglobin of 12.4 and platelets 166,000 with absolute neutrophil count quite adequate at 2700 and no frequent infections.  We will check his CBC in 3 months and again just prior to return in 6 months given stability of counts over time with no significant rise in white count and no triggers for need of treatment.    -9/30/2022 [facility] Oncology/Hematology clinic follow-up: Mr. Walsh continues to do well, still no indication for treatment.  CBC is stable with WBC 62,590, mild anemia with hemoglobin of 12.5, hematocrit 37.2%, MCV normal at 96.6, platelet count normal at 149,000.  He has no new worrisome symptoms.  No lymphadenopathy.  We will continue with observation, we will repeat CBC every 3 months and I have ordered that today.  We will see him back in 6 months for follow-up.    - 2/21/2023 follow-up Dr.Saini MORRIS ENT.  He had transsphenoidal hypophysis ectomy with right nasoseptal flap 1/18/2023.  Nasal congestion improved.  No watery drainage.  Back on CPAP.  Slow improvement of smell.  Had large sellar mass.  Found on evaluation by Dr. Bell endocrinology at [facility].  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  Postop MRI showed possible residual near cavernous sinus Per Sammy review of postop.  Improvement in visual field cuts.  Following with Dr. Bell.  Follow-up with Dr. Christianson with cerebrovascular, skull-based, and endovascular neurosurgical group at  mid-March.    -2/24/2023 [facility] medical oncology  hematology follow-up:White count today virtually identical to September 2022… White count 62,360.  Hemoglobin slightly lower at 10.9 but with normochromic normocytic indices and platelets normal 159,000 with absolute lymphocyte count 54,540 and normal absolute neutrophil count 5160.  No bulky palpable cervical axillary or inguinal adenopathy.  No frequent infections.  No hint of this progressing CLL.  We will repeat his labs on return in 6 months.    -8/29/2023 [facility] Hematology/Oncology clinic follow-up: Mr. Walsh is doing well, he has no new worrisome symptoms, his CBC is stable as shown above.  WBC slightly higher at 75,870, hemoglobin is good at 12.6 with hematocrit 37.6%, platelets 122,000, absolute lymphocytes 67.42.  He has had no doubling of his counts.  He has no adenopathy, no frequent infections, no unusual fatigue.  We will continue monitoring CBC, I will check it again in 3 months since his platelets dropped somewhat but they are still well above 100,000, will also check on return in 6 months.    -2/29/2024 [facility] hematology oncology follow-up: He did not have CBC done since last we saw him.White count is 83,620 with hemoglobin 11.6 fairly stable over time.  Platelets slightly lower at 111,000.  Absolute lymphocyte count 74,820 up from 54,000 a year ago hence lymphocyte count doubling time is not 6 months or less and he still has no bulky adenopathy on exam or hemoglobin less than 10 hemolytic in nature or platelet count less than 100,000 not due to ITP.  No unexplained fatigue or frequent infections or unexplained B symptoms will repeat CBC in 6 months with no current indication for treatment.     CLL (chronic lymphocytic leukemia)   3/19/2021 Initial Diagnosis    CLL (chronic lymphocytic leukemia) (CMS/[facility])     4/9/2021 Cancer Staged    Staging form: Chronic Lymphocytic Leukemia / Small Lymphocytic Lymphoma, AJCC 8th Edition  - Clinical: Modified Mina Stage 0 (Modified Mina risk: Low,  Lymphocytosis: Present, Adenopathy: Absent, Organomegaly: Absent, Anemia: Absent, Thrombocytopenia: Absent) - Signed by Levi Saunders MD on 4/9/2021         HISTORY OF PRESENT ILLNESS:  The patient is a 63 y.o. male, here for follow up on management of asymptomatic CLL    Past Medical History:   Diagnosis Date    Cancer CLL    Cataract     Bilateral    Diverticulosis     Essential hypertension 03/2021    3/21 started Lisinopril    Lymphocytosis 03/08/2021    3/2021. WBC 39.56. Abs lymphocyte 38. Plt 138. Hgb 14.1. Referral to hematology. CLL?    Nonrheumatic mitral valve regurgitation 03/03/2021    Systolic murmur 4/6 noted 3/2021. TTE EF 56 - 60%. Mild to moderate mitral valve regurgitation, eccentric-anteriorly directed    Osteoarthritis of knee     Prediabetes 03/08/2021    3/2021 A1c 6.49    Presbycusis of both ears 04/09/2021    Pseudopolyp of sigmoid colon without complication 04/09/2021    Right retinal detachment     2/2022    Seasonal allergic rhinitis due to pollen 02/22/2024    Sensorineural hearing loss (SNHL) of both ears 03/17/2021    3/2021 ENT. Also tinnitus. Recommended hearing aids    Sleep apnea     On CPAP consistently    Suprasellar mass 01/09/2023    Suprasellar mass on MRI 1/5/2023 - possible macroadenoma. Compression of optic nerve bilat    Tubular adenoma of colon 03/03/2021 4/2021: Tubular adenoma of sigmoid. No high grade.    Type 2 diabetes mellitus without complication, without long-term current use of insulin 11/11/2022 11/2022 A1c 6.7    Vitreous detachment of left eye     2/15/2022.     Past Surgical History:   Procedure Laterality Date    CATARACT EXTRACTION      december 2022    COLONOSCOPY      4/8/21    FINGER GANGLION CYST EXCISION Right     3rd finger in the 90s    INGUINAL HERNIA REPAIR Right     RETINAL DETACHMENT SURGERY      right eye  on 3/18/22       No Known Allergies    Family History and Social History reviewed and changed as necessary    REVIEW OF SYSTEM:   No B  "symptoms.  No adenopathy.  No rashes    PHYSICAL EXAM:  No jaundice icterus or pallor.  No petechiae or ecchymoses.  No palpable cervical axillary or inguinal adenopathy.  No hepatosplenomegaly.  No respiratory distress.    Vitals:    02/29/24 0902   BP: 148/83   Pulse: 64   Resp: 18   Temp: 97.6 °F (36.4 °C)   SpO2: 96%   Weight: 108 kg (239 lb)   Height: 179.1 cm (70.5\")     Vitals:    02/29/24 0902   PainSc: 0-No pain  Comment: +tooth ache          ECOG score: 0           Vitals reviewed.    ECOG: (0) Fully Active - Able to Carry On All Pre-disease Performance Without Restriction    Lab Results   Component Value Date    HGB 11.6 (L) 02/28/2024    HCT 37.0 (L) 02/28/2024    MCV 96.4 02/28/2024     (L) 02/28/2024    WBC 83.62 (C) 02/28/2024    NEUTROABS 3.97 02/28/2024    LYMPHSABS 74.82 (H) 02/28/2024    MONOSABS 4.43 (H) 02/28/2024    EOSABS 0.14 02/28/2024    BASOSABS 0.12 02/28/2024       Lab Results   Component Value Date    GLUCOSE 131 (H) 05/24/2023    BUN 19 05/24/2023    CREATININE 1.10 05/24/2023     05/24/2023    K 4.8 05/24/2023     05/24/2023    CO2 28.0 05/24/2023    CALCIUM 9.2 05/24/2023    PROTEINTOT 6.4 05/24/2023    ALBUMIN 4.1 05/24/2023    BILITOT 0.2 05/24/2023    ALKPHOS 53 05/24/2023    AST 15 05/24/2023    ALT 15 05/24/2023             ASSESSMENT & PLAN:  1.  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  Tinnitus  3.  Fungal nail infection  4.  Osteoarthritis  5.  Sleep apnea on CPAP  6.  History of right inguinal herniorrhaphy  7.  Diverticulosis  8.  Hypertension  9.  Status post transsphenoidal hypophysectomy in for nonfunctional pituitary macroadenoma with lateral field cuts improved postop    Hematology history timeline:  -3/8/2021 white count 39,560 with hemoglobin 14.1, platelets 138,000, 81% lymphocytes.  Peripheral smear shows smudge cells with atypical lymphocytes with " enlarged nuclei with inconspicuous nucleoli.  An adequate volume for flow cytometry on initial blood draw.  C-reactive protein less than 0.3.  PSA normal 3.69 glucose 119 otherwise unremarkable CMP.      -3/19/2021 [facility] hematology oncology initial consultation: We will get peripheral blood flow cytometry flow cytometric hematolymphoid neoplasia assessment including ZAP70/CD38, cytogenetics, B-cell rearrangement, immunoglobulin variable heavy chain and p53 mutational analysis sequencing will be done, as well as snip MicroArray for CLL.  Absolute neutrophil count is normal at 4350 with absolute lymphocyte count 32,040.  Without frequent infections I will not check quantitative immunoglobulins.  CT scans are not necessary for diagnosis, surveillance, routine monitoring of treatment response, or progression.  CT scans may be warranted if symptoms of bulky disease or the assessment of risk for tumor lysis syndrome prior to initiation of venetoclax might be considered.  We will check lactate dehydrogenase, beta-2 microglobulin and uric acid.  Lymphocytosis itself is not an indication for treatment.  NCCN guideline indications for treatment include:  Fatigue severe  Night sweats  Unexplained weight loss  Fever without infection  Threatened endorgan function due to bulk  Spleen greater than 6 cm below costal margin  Lymph nodes greater than 10 cm  Progressive nonhemolytic anemia hemoglobin less than 10 (hemolytic anemia treated with steroids)  Progressive nonimmune mediated destruction thrombocytopenia platelets less than 100,000 (ITP)  Steroid refractory cytopenias    If these thresholds are reached, the molecular mutational analysis I am doing will guide the specific therapy I recommend.  Presently he does not have any of those clinical thresholds.  He does have swollen MCP and PIP joints with brothers who have psoriatic arthritis and I will check his sedimentation rate, C-reactive protein, rheumatoid factor, and  extractable nuclear antigens as well and ultimately may need rheumatological referral.  Rheumatologic diseases can cause some lymphocytosis including monoclonal lymphocytic process but this would be higher lymphocyte count than I would expect from such processes.    -3/19/2021 data:  White count 36,900, hemoglobin 14.2, platelets 154,000, absolute neutrophil count 7900, absolute lymphocyte count 26,500.  CMP glucose 112, bicarb 32, otherwise unremarkable with creatinine 1.19 and normal liver enzymes.  Magnesium 2.1  Hemolytic panel: MARIS negative.  .  Uric acid normal 6.8.  ELLIE negative.  Rheumatoid factor negative.  proBNP normal 6.1  Sedimentation rate less than 1 with C-reactive protein 0.38.  Beta-2 microglobulin 3.5 upper limit 2.2  Flow cytometry: Clonal CD5 positive B-cell population 70% of analyzed cells consistent with CLL/SLL  ZAP70 positive/CD 38+: Double positive considered unfavorable and tends to correlate with immunoglobulin variable heavy chain unmutated status.  Immunoglobulin variable heavy chain somatic hyper mutation analysis did not yield interpretable results.  Cancer cytogenetic analysis showed 47, XY, +12 in all 20 metaphases cells.  CLL FISH panel positive for trisomy 12 which is detected in 20% of B-cell CLL which is associated with an intermediate-poor prognosis  B-cell immunoglobulin heavy and kappa light chain gene rearrangements detected corroborating of B-cell neoplasm.  T p53 mutation analysis negative by DNA sequencing.    -4/9/2021 [facility] medical oncology follow-up visit: I reviewed the above molecular data with the patient.  The ZAP70 and CD38 and trisomy 12 positivity put him at an intermediate to high risk of progression I will watch him fairly closely.  I will repeat his CBC and CMP and physical exam in 2 months.  Thresholds for therapy as outlined above.  Ultimately what we choose to treat at that point depends on the COVID-19 pandemic status as much as his disease  markers but he has not p53 mutated so we have a broader range of options.    -6/10/2021 [facility] hematology follow-up visit: 6/4/2021 data reveals white count 43,850 with hemoglobin 12.8 platelets 147,000.  Absolute neutrophil count normal 4820.  No bulky adenopathy.  We will repeat labs again in 2 months.  Indications/thresholds for therapy for CLL as indicated above have not been reached.    -9/10/2021 [facility] medical oncology follow-up visit: White count today is 52,700.  Hemoglobin is 13.9 with platelets 196,000 and no bulky adenopathy, fevers, chills, unexplained weight loss, effusions, bed drenching night sweats.  Hence has not reached NCCN guideline indications for treatment as outlined above.  We will see him back in 3 months with monthly CBC in the interim.  He has not had doubling of his lymphocyte count in less than 6 months either.  Recommended Covid vaccination with booster.    -12/20/2021 [facility] Oncology clinic follow-up:  Continues to do well on surveillance, white count stable currently at 59,100, absolute lymphocytes 51.42, no anemia or thrombocytopenia.  He has no lymphadenopathy or other symptoms suggestive of disease progression.  We will continue with monthly CBC.  We will see him back in 3 months for follow-up.  If counts remain stable at that time we may be able to extend the time frame of checking his CBC out a little bit.  Indications for treatment per NCCN guidelines are listed above note dated 3/19/2021.    -3/21/2022 [facility] medical oncology follow-up visit: Clinically he is doing well with no new somatic complaints.  His white count is actually lower over the last 3 months now 53,930 with a hemoglobin of 12.4 and platelets 166,000 with absolute neutrophil count quite adequate at 2700 and no frequent infections.  We will check his CBC in 3 months and again just prior to return in 6 months given stability of counts over time with no significant rise in white count and no triggers for need  of treatment.    -9/30/2022 [facility] Oncology/Hematology clinic follow-up: Mr. Walsh continues to do well, still no indication for treatment.  CBC is stable with WBC 62,590, mild anemia with hemoglobin of 12.5, hematocrit 37.2%, MCV normal at 96.6, platelet count normal at 149,000.  He has no new worrisome symptoms.  No lymphadenopathy.  We will continue with observation, we will repeat CBC every 3 months and I have ordered that today.  We will see him back in 6 months for follow-up.    - 2/21/2023 follow-up Dr.Saini MORRIS ENT.  He had transsphenoidal hypophysis ectomy with right nasoseptal flap 1/18/2023.  Nasal congestion improved.  No watery drainage.  Back on CPAP.  Slow improvement of smell.  Had large sellar mass.  Found on evaluation by Dr. Bell endocrinology at [facility].  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  Postop MRI showed possible residual near cavernous sinus Per Sammy review of postop.  Improvement in visual field cuts.  Following with Dr. Bell.  Follow-up with Dr. Christianson with cerebrovascular, skull-based, and endovascular neurosurgical group at  mid-March.    -2/24/2023 [facility] medical oncology hematology follow-up:White count today virtually identical to September 2022… White count 62,360.  Hemoglobin slightly lower at 10.9 but with normochromic normocytic indices and platelets normal 159,000 with absolute lymphocyte count 54,540 and normal absolute neutrophil count 5160.  No bulky palpable cervical axillary or inguinal adenopathy.  No frequent infections.  No hint of this progressing CLL.  We will repeat his labs on return in 6 months.    -8/29/2023 [facility] Hematology/Oncology clinic follow-up: Mr. Walsh is doing well, he has no new worrisome symptoms, his CBC is stable as shown above.  WBC slightly higher at 75,870, hemoglobin is good at 12.6 with hematocrit 37.6%, platelets 122,000, absolute lymphocytes  67.42.  He has had no doubling of his counts.  He has no adenopathy, no frequent infections, no unusual fatigue.  We will continue monitoring CBC, I will check it again in 3 months since his platelets dropped somewhat but they are still well above 100,000, will also check on return in 6 months.    -2/29/2024 [facility] hematology oncology follow-up: He did not have CBC done since last we saw him.White count is 83,620 with hemoglobin 11.6 fairly stable over time.  Platelets slightly lower at 111,000.  Absolute lymphocyte count 74,820 up from 54,000 a year ago hence lymphocyte count doubling time is not 6 months or less and he still has no bulky adenopathy on exam or hemoglobin less than 10 hemolytic in nature or platelet count less than 100,000 not due to ITP.  No unexplained fatigue or frequent infections or unexplained B symptoms will repeat CBC in 6 months with no current indication for treatment.    Total time of care today inclusive of time spent today prior to patient's arrival reviewing interval data and interviewing him as to signs or symptoms of his disease and follow-up thereof and after visit instituting this plan took 30 minutes of patient care time throughout the day today.  Levi Saunders MD    02/29/2024

## 2024-04-01 DIAGNOSIS — I10 ESSENTIAL HYPERTENSION: ICD-10-CM

## 2024-04-02 RX ORDER — LISINOPRIL 10 MG/1
10 TABLET ORAL DAILY
Qty: 30 TABLET | Refills: 0 | Status: SHIPPED | OUTPATIENT
Start: 2024-04-02

## 2024-04-02 RX ORDER — CHLORTHALIDONE 25 MG/1
25 TABLET ORAL DAILY
Qty: 30 TABLET | Refills: 0 | Status: SHIPPED | OUTPATIENT
Start: 2024-04-02

## 2024-04-21 DIAGNOSIS — E11.9 TYPE 2 DIABETES MELLITUS WITHOUT COMPLICATION, WITHOUT LONG-TERM CURRENT USE OF INSULIN: Chronic | ICD-10-CM

## 2024-04-22 NOTE — TELEPHONE ENCOUNTER
Rx Refill Note  Requested Prescriptions     Pending Prescriptions Disp Refills    metFORMIN (GLUCOPHAGE) 500 MG tablet 60 tablet 5     Sig: Take 1 tablet by mouth 2 (Two) Times a Day With Meals.      Last office visit with prescribing clinician: 2/22/2024   Last telemedicine visit with prescribing clinician: Visit date not found   Next office visit with prescribing clinician: 5/20/2024                         Would you like a call back once the refill request has been completed: [] Yes [] No    If the office needs to give you a call back, can they leave a voicemail: [] Yes [] No    Lin Sanderson LPN  04/22/24, 10:40 EDT

## 2024-05-10 DIAGNOSIS — I10 ESSENTIAL HYPERTENSION: ICD-10-CM

## 2024-05-10 RX ORDER — LISINOPRIL 10 MG/1
10 TABLET ORAL DAILY
Qty: 30 TABLET | Refills: 0 | OUTPATIENT
Start: 2024-05-10

## 2024-05-10 RX ORDER — LISINOPRIL 10 MG/1
10 TABLET ORAL DAILY
Qty: 30 TABLET | Refills: 0 | Status: SHIPPED | OUTPATIENT
Start: 2024-05-10

## 2024-05-13 RX ORDER — CHLORTHALIDONE 25 MG/1
25 TABLET ORAL DAILY
Qty: 30 TABLET | Refills: 0 | Status: SHIPPED | OUTPATIENT
Start: 2024-05-13

## 2024-05-15 ENCOUNTER — PATIENT OUTREACH (OUTPATIENT)
Dept: CASE MANAGEMENT | Facility: OTHER | Age: 64
End: 2024-05-15
Payer: COMMERCIAL

## 2024-05-15 DIAGNOSIS — I10 ESSENTIAL HYPERTENSION: Primary | Chronic | ICD-10-CM

## 2024-05-15 NOTE — OUTREACH NOTE
[facility] Enrollment    Was the enrollment attempt to reach the patient successful?: yes  Date/Time of successful contact: 5/15/2024  3:05 PM  Patient response: not interested           Alesia LENTZ  Ambulatory Case Management    5/15/2024, 15:05 EDT

## 2024-05-20 ENCOUNTER — OFFICE VISIT (OUTPATIENT)
Dept: INTERNAL MEDICINE | Facility: CLINIC | Age: 64
End: 2024-05-20
Payer: COMMERCIAL

## 2024-05-20 VITALS
SYSTOLIC BLOOD PRESSURE: 100 MMHG | WEIGHT: 234.8 LBS | HEART RATE: 60 BPM | BODY MASS INDEX: 32.87 KG/M2 | HEIGHT: 71 IN | DIASTOLIC BLOOD PRESSURE: 60 MMHG | TEMPERATURE: 98 F

## 2024-05-20 DIAGNOSIS — L98.9 LEG SKIN LESION, RIGHT: ICD-10-CM

## 2024-05-20 DIAGNOSIS — Z00.00 WELL ADULT EXAM: Primary | ICD-10-CM

## 2024-05-20 DIAGNOSIS — E11.40 TYPE 2 DIABETES MELLITUS WITH DIABETIC NEUROPATHY, WITHOUT LONG-TERM CURRENT USE OF INSULIN: Chronic | ICD-10-CM

## 2024-05-20 DIAGNOSIS — C91.10 CLL (CHRONIC LYMPHOCYTIC LEUKEMIA): Chronic | ICD-10-CM

## 2024-05-20 DIAGNOSIS — E66.9 OBESITY (BMI 30.0-34.9): ICD-10-CM

## 2024-05-20 DIAGNOSIS — D35.2 PITUITARY MACROADENOMA WITH EXTRASELLAR EXTENSION: ICD-10-CM

## 2024-05-20 DIAGNOSIS — I10 ESSENTIAL HYPERTENSION: Chronic | ICD-10-CM

## 2024-05-20 DIAGNOSIS — R74.8 LOW SERUM HDL: Chronic | ICD-10-CM

## 2024-05-20 LAB
EXPIRATION DATE: ABNORMAL
HBA1C MFR BLD: 6.5 % (ref 4.5–5.7)
Lab: ABNORMAL

## 2024-05-20 PROCEDURE — 99397 PER PM REEVAL EST PAT 65+ YR: CPT | Performed by: STUDENT IN AN ORGANIZED HEALTH CARE EDUCATION/TRAINING PROGRAM

## 2024-05-20 PROCEDURE — 83036 HEMOGLOBIN GLYCOSYLATED A1C: CPT | Performed by: STUDENT IN AN ORGANIZED HEALTH CARE EDUCATION/TRAINING PROGRAM

## 2024-05-20 PROCEDURE — 99214 OFFICE O/P EST MOD 30 MIN: CPT | Performed by: STUDENT IN AN ORGANIZED HEALTH CARE EDUCATION/TRAINING PROGRAM

## 2024-05-20 RX ORDER — MELOXICAM 15 MG/1
15 TABLET ORAL DAILY
COMMUNITY
Start: 2024-04-15

## 2024-05-20 NOTE — Clinical Note
Please request optometrist records for diabetic eye exam from [facility], see previous records for contact information.  Thanks

## 2024-05-20 NOTE — PROGRESS NOTES
Chief Complaint  Sigifredo Walsh is a 63 y.o. male presenting for Annual Exam and place on right leg.       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 Saunders, [facility]) and mitral valve regurgitation.     No nasal swabs, tubes or testing placed in patient's nose.  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    History of Present Illness  Patient is here for annual physical and follow-up.    He has a history of basal cell carcinomas of the skin and follows with dermatology.  Over the last 2-3 months he has noticed a lesion of the right posterior leg.  He is not sure if this was from an injury or just happened to appear.  He has applied Neosporin and bacitracin.  This lesion keeps scabbing up.  He has not been to see his dermatologist for.    Patient also is asking for prescription for guaifenesin.  He tells me his son takes this for his mood and patient feels this has been helpful for him.  I have informed patient that this is a over-the-counter medication also known as Mucinex, but typically not used for mood.  He might double check if this is truly the name of the medication that he is asking for.    He does not check his blood pressures at home.  He reports no lightheadedness or orthostatic symptoms when standing up.    He has been taking meloxicam for his knees, and feels this has given him good relief.    The following portions of the patient's history were reviewed and updated as appropriate: allergies, current medications, past family history, past medical history, past social history, past surgical history, and problem list.    Image captured per patient verbal consent:          Objective  /60 (BP Location:  "Left arm, Patient Position: Sitting, Cuff Size: Large Adult)   Pulse 60   Temp 98 °F (36.7 °C) (Temporal)   Ht 180.3 cm (71\")   Wt 107 kg (234 lb 12.8 oz)   BMI 32.75 kg/m²     Physical Exam  Vitals reviewed.   Constitutional:       Appearance: Normal appearance.   HENT:      Head: Normocephalic and atraumatic.      Right Ear: Tympanic membrane, ear canal and external ear normal. There is no impacted cerumen.      Left Ear: Tympanic membrane, ear canal and external ear normal. There is no impacted cerumen.      Nose: Nose normal. No congestion.      Mouth/Throat:      Mouth: Mucous membranes are moist.   Eyes:      Extraocular Movements: Extraocular movements intact.      Conjunctiva/sclera: Conjunctivae normal.   Cardiovascular:      Rate and Rhythm: Normal rate and regular rhythm.      Pulses:           Dorsalis pedis pulses are 2+ on the right side and 2+ on the left side.        Posterior tibial pulses are 1+ on the right side and 1+ on the left side.      Heart sounds: Murmur heard.   Pulmonary:      Effort: Pulmonary effort is normal.      Breath sounds: Normal breath sounds.   Abdominal:      General: There is no distension.      Palpations: Abdomen is soft. There is no mass.      Tenderness: There is no abdominal tenderness.   Musculoskeletal:         General: No swelling.      Cervical back: Neck supple.      Right lower leg: No edema.      Left lower leg: No edema.      Right foot: Normal range of motion.      Left foot: Normal range of motion.   Feet:      Right foot:      Protective Sensation: 10 sites tested.  6 sites sensed.      Skin integrity: Skin integrity normal. No ulcer or skin breakdown.      Toenail Condition: Right toenails are normal.      Left foot:      Protective Sensation: 10 sites tested.  6 sites sensed.      Skin integrity: Skin integrity normal. No ulcer or skin breakdown.      Toenail Condition: Left toenails are normal.      Comments: Diabetic Foot Exam Performed and " Monofilament Test Performed  Mild neuropathy  Skin:     General: Skin is warm and dry.      Findings: Lesion present.      Comments: See image of crusted lesion of right posterior leg   Neurological:      Mental Status: He is alert and oriented to person, place, and time. Mental status is at baseline.   Psychiatric:         Behavior: Behavior normal.         Thought Content: Thought content normal.         Assessment/Plan   1. Well adult exam  Counseled on recommendations for COVID booster.  Counseled on recommendations for regular dental visits, patient typically goes 2-4 times yearly.  Counseled on recommendations for moderate intensity exercise 30 minutes daily, 5 days a week.    2. Type 2 diabetes mellitus with diabetic neuropathy, without long-term current use of insulin  Hemoglobin A1C   Date Value Ref Range Status   05/20/2024 6.5 (A) 4.5 - 5.7 % Final   02/22/2024 6.6 (A) 4.5 - 5.7 % Final   10/24/2023 7.2 (A) 4.5 - 5.7 % Final   05/24/2023 6.60 (H) 4.80 - 5.60 % Final   03/27/2023 6.0 (H) <5.7 % Final   01/19/2023 6.4 (H) <5.7 % Final   02/11/2022 6.30 (H) 4.80 - 5.60 % Final   08/11/2021 5.94 (H) 4.80 - 5.60 % Final   Good glycemic control.  Foot exam does demonstrate mild neuropathy, likely in setting of his diabetes.  Will continue to monitor  - POC Glycosylated Hemoglobin (Hb A1C)  - Comprehensive Metabolic Panel; Future  - MicroAlbumin, Urine, Random - Urine, Clean Catch; Future    3. Leg skin lesion, right  Not sure if this was caused by trauma, could be precancerous/cancerous lesion.  If this does not heal up in the next 2-4 weeks I recommend he gets in touch with his dermatologist for an evaluation.    4. Essential hypertension  BP Readings from Last 3 Encounters:   05/20/24 100/60   02/29/24 148/83   02/22/24 102/72   Blood pressure borderline low, but asymptomatic.  Continue on current medications for now.    5. CLL (chronic lymphocytic leukemia)  Stable.  Continue follow-up with hematology.  Will  do blood work at the same time he does CBC for hematology.      6. Pituitary macroadenoma with extrasellar extension  Stable and doing well.    7. Obesity (BMI 30.0-34.9)  BMI is >= 30 and <35. (Class 1 Obesity). The following options were offered after discussion;: exercise counseling/recommendations      8. Low serum HDL  - Lipid Panel; Future      Advance Care Planning   ACP discussion was held with the patient during this visit. Patient does not have an advance directive, information provided.         Return in about 4 months (around 9/20/2024).    Future Appointments         Provider Department Center    8/30/2024 10:30 AM (Arrive by 10:15 AM) Huma Somers APRN [facility] HEMATOLOGY & [facility]COL[facility]Y [facility]    9/16/2024 8:30 AM (Arrive by 8:15 AM) Ayde Bell MD [facility] ENDOCRINOLOGY [facility]    9/20/2024 9:00 AM Jasper Zhao MD [facility] INTERNAL MEDICINE [facility]    10/15/2024 8:30 AM Corbin Gutierrez MD [facility] RHEUMATOLOGY [facility]              Jasper Zhao MD  Family Medicine  05/20/2024

## 2024-06-03 DIAGNOSIS — I10 ESSENTIAL HYPERTENSION: ICD-10-CM

## 2024-06-03 RX ORDER — LISINOPRIL 10 MG/1
10 TABLET ORAL DAILY
Qty: 30 TABLET | Refills: 5 | Status: SHIPPED | OUTPATIENT
Start: 2024-06-03

## 2024-06-03 NOTE — TELEPHONE ENCOUNTER
Rx Refill Note  Requested Prescriptions     Pending Prescriptions Disp Refills    lisinopril (PRINIVIL,ZESTRIL) 10 MG tablet [Pharmacy Med Name: Lisinopril 10 MG Oral Tablet] 30 tablet 5     Sig: Take 1 tablet by mouth once daily      Last office visit with prescribing clinician: 5/20/2024   Last telemedicine visit with prescribing clinician: Visit date not found   Next office visit with prescribing clinician: 9/20/2024                         Would you like a call back once the refill request has been completed: [] Yes [] No    If the office needs to give you a call back, can they leave a voicemail: [] Yes [] No    Courtney Kingston MA  06/03/24, 13:15 EDT

## 2024-06-10 RX ORDER — CHLORTHALIDONE 25 MG/1
25 TABLET ORAL DAILY
Qty: 30 TABLET | Refills: 0 | Status: SHIPPED | OUTPATIENT
Start: 2024-06-10

## 2024-06-10 NOTE — TELEPHONE ENCOUNTER
Rx Refill Note  Requested Prescriptions     Pending Prescriptions Disp Refills    chlorthalidone (HYGROTON) 25 MG tablet [Pharmacy Med Name: Chlorthalidone 25 MG Oral Tablet] 30 tablet 0     Sig: Take 1 tablet by mouth once daily      Last office visit with prescribing clinician: 5/20/2024   Last telemedicine visit with prescribing clinician: Visit date not found   Next office visit with prescribing clinician: 9/20/2024                         Would you like a call back once the refill request has been completed: [] Yes [] No    If the office needs to give you a call back, can they leave a voicemail: [] Yes [] No    Lin Sanderson LPN  06/10/24, 12:00 EDT

## 2024-07-29 RX ORDER — CHLORTHALIDONE 25 MG/1
25 TABLET ORAL DAILY
Qty: 30 TABLET | Refills: 0 | Status: SHIPPED | OUTPATIENT
Start: 2024-07-29

## 2024-07-29 NOTE — TELEPHONE ENCOUNTER
Rx Refill Note  Requested Prescriptions     Pending Prescriptions Disp Refills    chlorthalidone (HYGROTON) 25 MG tablet 30 tablet 0     Sig: Take 1 tablet by mouth Daily.      Last office visit with prescribing clinician: 5/20/2024   Last telemedicine visit with prescribing clinician: Visit date not found   Next office visit with prescribing clinician: 9/20/2024                         Would you like a call back once the refill request has been completed: [] Yes [] No    If the office needs to give you a call back, can they leave a voicemail: [] Yes [] No    Courtney Kingston MA  07/29/24, 08:27 EDT

## 2024-08-29 ENCOUNTER — TELEPHONE (OUTPATIENT)
Dept: ONCOLOGY | Facility: CLINIC | Age: 64
End: 2024-08-29
Payer: COMMERCIAL

## 2024-08-29 ENCOUNTER — LAB (OUTPATIENT)
Dept: LAB | Facility: HOSPITAL | Age: 64
End: 2024-08-29
Payer: COMMERCIAL

## 2024-08-29 DIAGNOSIS — C91.10 CLL (CHRONIC LYMPHOCYTIC LEUKEMIA): ICD-10-CM

## 2024-08-29 DIAGNOSIS — E11.40 TYPE 2 DIABETES MELLITUS WITH DIABETIC NEUROPATHY, WITHOUT LONG-TERM CURRENT USE OF INSULIN: Chronic | ICD-10-CM

## 2024-08-29 DIAGNOSIS — R74.8 LOW SERUM HDL: Chronic | ICD-10-CM

## 2024-08-29 LAB
ALBUMIN SERPL-MCNC: 4.2 G/DL (ref 3.5–5.2)
ALBUMIN UR-MCNC: 1.5 MG/DL
ALBUMIN/GLOB SERPL: 1.8 G/DL
ALP SERPL-CCNC: 59 U/L (ref 39–117)
ALT SERPL W P-5'-P-CCNC: 17 U/L (ref 1–41)
ANION GAP SERPL CALCULATED.3IONS-SCNC: 8.4 MMOL/L (ref 5–15)
AST SERPL-CCNC: 17 U/L (ref 1–40)
BASOPHILS # BLD MANUAL: 0 10*3/MM3 (ref 0–0.2)
BASOPHILS NFR BLD MANUAL: 0 % (ref 0–1.5)
BILIRUB SERPL-MCNC: 0.6 MG/DL (ref 0–1.2)
BUN SERPL-MCNC: 19 MG/DL (ref 8–23)
BUN/CREAT SERPL: 14.4 (ref 7–25)
CALCIUM SPEC-SCNC: 9.7 MG/DL (ref 8.6–10.5)
CHLORIDE SERPL-SCNC: 105 MMOL/L (ref 98–107)
CHOLEST SERPL-MCNC: 91 MG/DL (ref 0–200)
CO2 SERPL-SCNC: 27.6 MMOL/L (ref 22–29)
CREAT SERPL-MCNC: 1.32 MG/DL (ref 0.76–1.27)
DEPRECATED RDW RBC AUTO: 53.2 FL (ref 37–54)
EGFRCR SERPLBLD CKD-EPI 2021: 59.9 ML/MIN/1.73
EOSINOPHIL # BLD MANUAL: 0 10*3/MM3 (ref 0–0.4)
EOSINOPHIL NFR BLD MANUAL: 0 % (ref 0.3–6.2)
ERYTHROCYTE [DISTWIDTH] IN BLOOD BY AUTOMATED COUNT: 14.4 % (ref 12.3–15.4)
GLOBULIN UR ELPH-MCNC: 2.4 GM/DL
GLUCOSE SERPL-MCNC: 114 MG/DL (ref 65–99)
HCT VFR BLD AUTO: 37.7 % (ref 37.5–51)
HDLC SERPL-MCNC: 23 MG/DL (ref 40–60)
HGB BLD-MCNC: 12.1 G/DL (ref 13–17.7)
LDLC SERPL CALC-MCNC: 47 MG/DL (ref 0–100)
LDLC/HDLC SERPL: 1.96 {RATIO}
LYMPHOCYTES # BLD MANUAL: 91.31 10*3/MM3 (ref 0.7–3.1)
LYMPHOCYTES NFR BLD MANUAL: 0 % (ref 5–12)
MCH RBC QN AUTO: 32.2 PG (ref 26.6–33)
MCHC RBC AUTO-ENTMCNC: 32.1 G/DL (ref 31.5–35.7)
MCV RBC AUTO: 100.3 FL (ref 79–97)
MONOCYTES # BLD: 0 10*3/MM3 (ref 0.1–0.9)
NEUTROPHILS # BLD AUTO: 5.83 10*3/MM3 (ref 1.7–7)
NEUTROPHILS NFR BLD MANUAL: 6 % (ref 42.7–76)
NRBC SPEC MANUAL: 0 /100 WBC (ref 0–0.2)
PLAT MORPH BLD: NORMAL
PLATELET # BLD AUTO: 125 10*3/MM3 (ref 140–450)
PMV BLD AUTO: 10.4 FL (ref 6–12)
POTASSIUM SERPL-SCNC: 5.1 MMOL/L (ref 3.5–5.2)
PROT SERPL-MCNC: 6.6 G/DL (ref 6–8.5)
RBC # BLD AUTO: 3.76 10*6/MM3 (ref 4.14–5.8)
RBC MORPH BLD: NORMAL
SODIUM SERPL-SCNC: 141 MMOL/L (ref 136–145)
TRIGL SERPL-MCNC: 115 MG/DL (ref 0–150)
VARIANT LYMPHS NFR BLD MANUAL: 94 % (ref 19.6–45.3)
VLDLC SERPL-MCNC: 21 MG/DL (ref 5–40)
WBC MORPH BLD: NORMAL
WBC NRBC COR # BLD AUTO: 97.14 10*3/MM3 (ref 3.4–10.8)

## 2024-08-29 PROCEDURE — 85025 COMPLETE CBC W/AUTO DIFF WBC: CPT

## 2024-08-29 PROCEDURE — 85007 BL SMEAR W/DIFF WBC COUNT: CPT

## 2024-08-29 PROCEDURE — 36415 COLL VENOUS BLD VENIPUNCTURE: CPT

## 2024-08-29 PROCEDURE — 80061 LIPID PANEL: CPT

## 2024-08-29 PROCEDURE — 80053 COMPREHEN METABOLIC PANEL: CPT

## 2024-08-29 PROCEDURE — 82043 UR ALBUMIN QUANTITATIVE: CPT

## 2024-08-29 PROCEDURE — 85060 BLOOD SMEAR INTERPRETATION: CPT

## 2024-08-29 NOTE — TELEPHONE ENCOUNTER
Name of Physician notified: Huma Somers, APRN    Time Physician Notified: [id number]      []  Orders received    []  Protocol/Standing orders followed    [x]  No new orders

## 2024-08-29 NOTE — TELEPHONE ENCOUNTER
Critical Test Results      MD:  Lizbet    Date:  8/29/2024     Critical test result:  WBC 97.14    Time results received:  2:23

## 2024-08-30 ENCOUNTER — OFFICE VISIT (OUTPATIENT)
Dept: ONCOLOGY | Facility: CLINIC | Age: 64
End: 2024-08-30
Payer: COMMERCIAL

## 2024-08-30 VITALS
DIASTOLIC BLOOD PRESSURE: 67 MMHG | WEIGHT: 231 LBS | OXYGEN SATURATION: 95 % | TEMPERATURE: 97.5 F | HEART RATE: 72 BPM | HEIGHT: 71 IN | SYSTOLIC BLOOD PRESSURE: 119 MMHG | RESPIRATION RATE: 20 BRPM | BODY MASS INDEX: 32.34 KG/M2

## 2024-08-30 DIAGNOSIS — R79.89 ELEVATED SERUM CREATININE: Primary | ICD-10-CM

## 2024-08-30 DIAGNOSIS — E11.40 TYPE 2 DIABETES MELLITUS WITH DIABETIC NEUROPATHY, WITHOUT LONG-TERM CURRENT USE OF INSULIN: Chronic | ICD-10-CM

## 2024-08-30 DIAGNOSIS — C91.10 CLL (CHRONIC LYMPHOCYTIC LEUKEMIA): Primary | Chronic | ICD-10-CM

## 2024-08-30 PROBLEM — N28.9 FUNCTION KIDNEY DECREASED: Status: RESOLVED | Noted: 2024-08-30 | Resolved: 2024-08-30

## 2024-08-30 PROBLEM — N28.9 FUNCTION KIDNEY DECREASED: Status: ACTIVE | Noted: 2024-08-30

## 2024-08-30 LAB
CYTOLOGIST CVX/VAG CYTO: NORMAL
PATH INTERP BLD-IMP: NORMAL

## 2024-08-30 PROCEDURE — 99214 OFFICE O/P EST MOD 30 MIN: CPT | Performed by: NURSE PRACTITIONER

## 2024-08-30 NOTE — PROGRESS NOTES
CHIEF COMPLAINT: Right lower extremity skin lesion with poor healing    Problem List:  Oncology/Hematology History Overview Note   1.  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  Tinnitus  3.  Fungal nail infection  4.  Osteoarthritis  5.  Sleep apnea on CPAP  6.  History of right inguinal herniorrhaphy  7.  Diverticulosis  8.  Hypertension  9.  Status post transsphenoidal hypophysectomy in for nonfunctional pituitary macroadenoma with lateral field cuts improved postop    Hematology history timeline:  -3/8/2021 white count 39,560 with hemoglobin 14.1, platelets 138,000, 81% lymphocytes.  Peripheral smear shows smudge cells with atypical lymphocytes with enlarged nuclei with inconspicuous nucleoli.  An adequate volume for flow cytometry on initial blood draw.  C-reactive protein less than 0.3.  PSA normal 3.69 glucose 119 otherwise unremarkable CMP.      -3/19/2021 [facility] hematology oncology initial consultation: We will get peripheral blood flow cytometry flow cytometric hematolymphoid neoplasia assessment including ZAP70/CD38, cytogenetics, B-cell rearrangement, immunoglobulin variable heavy chain and p53 mutational analysis sequencing will be done, as well as snip MicroArray for CLL.  Absolute neutrophil count is normal at 4350 with absolute lymphocyte count 32,040.  Without frequent infections I will not check quantitative immunoglobulins.  CT scans are not necessary for diagnosis, surveillance, routine monitoring of treatment response, or progression.  CT scans may be warranted if symptoms of bulky disease or the assessment of risk for tumor lysis syndrome prior to initiation of venetoclax might be considered.  We will check lactate dehydrogenase, beta-2 microglobulin and uric acid.  Lymphocytosis itself is not an indication for treatment.  NCCN guideline indications for treatment include:  Fatigue severe  Night  sweats  Unexplained weight loss  Fever without infection  Threatened endorgan function due to bulk  Spleen greater than 6 cm below costal margin  Lymph nodes greater than 10 cm  Progressive nonhemolytic anemia hemoglobin less than 10 (hemolytic anemia treated with steroids)  Progressive nonimmune mediated destruction thrombocytopenia platelets less than 100,000 (ITP)  Steroid refractory cytopenias    If these thresholds are reached, the molecular mutational analysis I am doing will guide the specific therapy I recommend.  Presently he does not have any of those clinical thresholds.  He does have swollen MCP and PIP joints with brothers who have psoriatic arthritis and I will check his sedimentation rate, C-reactive protein, rheumatoid factor, and extractable nuclear antigens as well and ultimately may need rheumatological referral.  Rheumatologic diseases can cause some lymphocytosis including monoclonal lymphocytic process but this would be higher lymphocyte count than I would expect from such processes.    -3/19/2021 data:  White count 36,900, hemoglobin 14.2, platelets 154,000, absolute neutrophil count 7900, absolute lymphocyte count 26,500.  CMP glucose 112, bicarb 32, otherwise unremarkable with creatinine 1.19 and normal liver enzymes.  Magnesium 2.1  Hemolytic panel: MAIRS negative.  .  Uric acid normal 6.8.  ELLIE negative.  Rheumatoid factor negative.  proBNP normal 6.1  Sedimentation rate less than 1 with C-reactive protein 0.38.  Beta-2 microglobulin 3.5 upper limit 2.2  Flow cytometry: Clonal CD5 positive B-cell population 70% of analyzed cells consistent with CLL/SLL  ZAP70 positive/CD 38+: Double positive considered unfavorable and tends to correlate with immunoglobulin variable heavy chain unmutated status.  Immunoglobulin variable heavy chain somatic hyper mutation analysis did not yield interpretable results.  Cancer cytogenetic analysis showed 47, XY, +12 in all 20 metaphases cells.  CLL FISH  panel positive for trisomy 12 which is detected in 20% of B-cell CLL which is associated with an intermediate-poor prognosis  B-cell immunoglobulin heavy and kappa light chain gene rearrangements detected corroborating of B-cell neoplasm.  T p53 mutation analysis negative by DNA sequencing.    -4/9/2021 [facility] medical oncology follow-up visit: I reviewed the above molecular data with the patient.  The ZAP70 and CD38 and trisomy 12 positivity put him at an intermediate to high risk of progression I will watch him fairly closely.  I will repeat his CBC and CMP and physical exam in 2 months.  Thresholds for therapy as outlined above.  Ultimately what we choose to treat at that point depends on the COVID-19 pandemic status as much as his disease markers but he has not p53 mutated so we have a broader range of options.    -6/10/2021 [facility] hematology follow-up visit: 6/4/2021 data reveals white count 43,850 with hemoglobin 12.8 platelets 147,000.  Absolute neutrophil count normal 4820.  No bulky adenopathy.  We will repeat labs again in 2 months.  Indications/thresholds for therapy for CLL as indicated above have not been reached.    -9/10/2021 [facility] medical oncology follow-up visit: White count today is 52,700.  Hemoglobin is 13.9 with platelets 196,000 and no bulky adenopathy, fevers, chills, unexplained weight loss, effusions, bed drenching night sweats.  Hence has not reached NCCN guideline indications for treatment as outlined above.  We will see him back in 3 months with monthly CBC in the interim.  He has not had doubling of his lymphocyte count in less than 6 months either.  Recommended Covid vaccination with booster.    -12/20/2021 [facility] Oncology clinic follow-up:  Continues to do well on surveillance, white count stable currently at 59,100, absolute lymphocytes 51.42, no anemia or thrombocytopenia.  He has no lymphadenopathy or other symptoms suggestive of disease progression.  We will continue with  monthly CBC.  We will see him back in 3 months for follow-up.  If counts remain stable at that time we may be able to extend the time frame of checking his CBC out a little bit.  Indications for treatment per NCCN guidelines are listed above note dated 3/19/2021.    -3/21/2022 [facility] medical oncology follow-up visit: Clinically he is doing well with no new somatic complaints.  His white count is actually lower over the last 3 months now 53,930 with a hemoglobin of 12.4 and platelets 166,000 with absolute neutrophil count quite adequate at 2700 and no frequent infections.  We will check his CBC in 3 months and again just prior to return in 6 months given stability of counts over time with no significant rise in white count and no triggers for need of treatment.    -9/30/2022 [facility] Oncology/Hematology clinic follow-up: Mr. Walsh continues to do well, still no indication for treatment.  CBC is stable with WBC 62,590, mild anemia with hemoglobin of 12.5, hematocrit 37.2%, MCV normal at 96.6, platelet count normal at 149,000.  He has no new worrisome symptoms.  No lymphadenopathy.  We will continue with observation, we will repeat CBC every 3 months and I have ordered that today.  We will see him back in 6 months for follow-up.    - 2/21/2023 follow-up Dr.Saini MORRIS ENT.  He had transsphenoidal hypophysis ectomy with right nasoseptal flap 1/18/2023.  Nasal congestion improved.  No watery drainage.  Back on CPAP.  Slow improvement of smell.  Had large sellar mass.  Found on evaluation by Dr. Bell endocrinology at [facility].  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  Postop MRI showed possible residual near cavernous sinus Per Sammy review of postop.  Improvement in visual field cuts.  Following with Dr. Bell.  Follow-up with Dr. Christianson with cerebrovascular, skull-based, and endovascular neurosurgical group at  mid-March.    -2/24/2023  [facility] medical oncology hematology follow-up:White count today virtually identical to September 2022… White count 62,360.  Hemoglobin slightly lower at 10.9 but with normochromic normocytic indices and platelets normal 159,000 with absolute lymphocyte count 54,540 and normal absolute neutrophil count 5160.  No bulky palpable cervical axillary or inguinal adenopathy.  No frequent infections.  No hint of this progressing CLL.  We will repeat his labs on return in 6 months.    -8/29/2023 [facility] Hematology/Oncology clinic follow-up: Mr. Walsh is doing well, he has no new worrisome symptoms, his CBC is stable as shown above.  WBC slightly higher at 75,870, hemoglobin is good at 12.6 with hematocrit 37.6%, platelets 122,000, absolute lymphocytes 67.42.  He has had no doubling of his counts.  He has no adenopathy, no frequent infections, no unusual fatigue.  We will continue monitoring CBC, I will check it again in 3 months since his platelets dropped somewhat but they are still well above 100,000, will also check on return in 6 months.    -2/29/2024 [facility] hematology oncology follow-up: He did not have CBC done since last we saw him.White count is 83,620 with hemoglobin 11.6 fairly stable over time.  Platelets slightly lower at 111,000.  Absolute lymphocyte count 74,820 up from 54,000 a year ago hence lymphocyte count doubling time is not 6 months or less and he still has no bulky adenopathy on exam or hemoglobin less than 10 hemolytic in nature or platelet count less than 100,000 not due to ITP.  No unexplained fatigue or frequent infections or unexplained B symptoms will repeat CBC in 6 months with no current indication for treatment.     CLL (chronic lymphocytic leukemia)   3/19/2021 Initial Diagnosis    CLL (chronic lymphocytic leukemia) (CMS/[facility])     4/9/2021 Cancer Staged    Staging form: Chronic Lymphocytic Leukemia / Small Lymphocytic Lymphoma, AJCC 8th Edition  - Clinical: Modified Mina Stage 0 (Modified  Mina risk: Low, Lymphocytosis: Present, Adenopathy: Absent, Organomegaly: Absent, Anemia: Absent, Thrombocytopenia: Absent) - Signed by Levi Saunders MD on 4/9/2021         HISTORY OF PRESENT ILLNESS:  The patient is a 63 y.o. male, here for follow up on management of CLL on watchful waiting.  Sigifredo reports overall he has been doing well since we saw him last.  He has a small skin ulceration on the right lower leg on the back of his ankle that has been slow to heal.  He states that he has followed with podiatry, they have recommended that he wear his compression stockings but he admits to not being faithful with this.  Otherwise he denies any frequent illnesses or infections.  No lymphadenopathy that he is aware of.  His appetite is normal, no early satiety.  No change in his bowel or bladder habits.  No abnormal weight loss.  No fevers, chills or bit drenching night sweats.  He remains quite active, he works full-time in even though he is mostly sedentary at a desk for the day he has multiple after work activities with the [employer] and [employer].    Past Medical History:   Diagnosis Date    Allergic 07/01/2023    Seasonal due to prior nasal surgery changing lining of nose    Cancer CLL    Cataract     Bilateral    Diverticulosis     Essential hypertension 03/2021    3/21 started Lisinopril    Lymphocytosis 03/08/2021    3/2021. WBC 39.56. Abs lymphocyte 38. Plt 138. Hgb 14.1. Referral to hematology. CLL?    Nonrheumatic mitral valve regurgitation 03/03/2021    Systolic murmur 4/6 noted 3/2021. TTE EF 56 - 60%. Mild to moderate mitral valve regurgitation, eccentric-anteriorly directed    Osteoarthritis of knee     Prediabetes 03/08/2021    3/2021 A1c 6.49    Presbycusis of both ears 04/09/2021    Pseudopolyp of sigmoid colon without complication 04/09/2021    Right retinal detachment     2/2022    Seasonal allergic rhinitis due to pollen 02/22/2024    Sensorineural hearing loss (SNHL) of both ears 03/17/2021     "3/2021 ENT. Also tinnitus. Recommended hearing aids    Sleep apnea     On CPAP consistently    Suprasellar mass 01/09/2023    Suprasellar mass on MRI 1/5/2023 - possible macroadenoma. Compression of optic nerve bilat    Tubular adenoma of colon 03/03/2021 4/2021: Tubular adenoma of sigmoid. No high grade.    Type 2 diabetes mellitus without complication, without long-term current use of insulin 11/11/2022 11/2022 A1c 6.7    Visual impairment 03/01/2023    tumor was pressing  optic nerve ,detached retina    Vitreous detachment of left eye     2/15/2022.     Past Surgical History:   Procedure Laterality Date    CATARACT EXTRACTION      december 2022    COLONOSCOPY      4/8/21    FINGER GANGLION CYST EXCISION Right     3rd finger in the 90s    INGUINAL HERNIA REPAIR Right     RETINAL DETACHMENT SURGERY      right eye  on 3/18/22    SINUS SURGERY  01/01/2023    went after pitutary tumor through nose       No Known Allergies    Family History and Social History reviewed and changed as necessary    REVIEW OF SYSTEM:   No B symptoms.  No adenopathy.  No rashes    PHYSICAL EXAM:  Well-developed, well-nourished healthy appearing male in no distress  No palpable lymphadenopathy  Respirations regular nonlabored  Abdomen soft, nontender, nondistended, no organomegaly  Lower extremity with prominent varicose veins bilaterally, no edema      Vitals:    08/30/24 1028   BP: 119/67   Pulse: 72   Resp: 20   Temp: 97.5 °F (36.4 °C)   TempSrc: Infrared   SpO2: 95%   Weight: 105 kg (231 lb)   Height: 180.3 cm (71\")     Vitals:    08/30/24 1028   PainSc: 0-No pain          ECOG score: 0           Vitals reviewed.  Labs reviewed    ECOG: (0) Fully Active - Able to Carry On All Pre-disease Performance Without Restriction    Lab Results   Component Value Date    HGB 12.1 (L) 08/29/2024    HCT 37.7 08/29/2024    .3 (H) 08/29/2024     (L) 08/29/2024    WBC 97.14 (C) 08/29/2024    NEUTROABS 5.83 08/29/2024    LYMPHSABS " 74.82 (H) 02/28/2024    MONOSABS 4.43 (H) 02/28/2024    EOSABS 0.00 08/29/2024    BASOSABS 0.00 08/29/2024       Lab Results   Component Value Date    GLUCOSE 114 (H) 08/29/2024    BUN 19 08/29/2024    CREATININE 1.32 (H) 08/29/2024     08/29/2024    K 5.1 08/29/2024     08/29/2024    CO2 27.6 08/29/2024    CALCIUM 9.7 08/29/2024    PROTEINTOT 6.6 08/29/2024    ALBUMIN 4.2 08/29/2024    BILITOT 0.6 08/29/2024    ALKPHOS 59 08/29/2024    AST 17 08/29/2024    ALT 17 08/29/2024             ASSESSMENT & PLAN:  1.  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  Tinnitus  3.  Fungal nail infection  4.  Osteoarthritis  5.  Sleep apnea on CPAP  6.  History of right inguinal herniorrhaphy  7.  Diverticulosis  8.  Hypertension  9.  Status post transsphenoidal hypophysectomy in for nonfunctional pituitary macroadenoma with lateral field cuts improved postop    Hematology history timeline:  -3/8/2021 white count 39,560 with hemoglobin 14.1, platelets 138,000, 81% lymphocytes.  Peripheral smear shows smudge cells with atypical lymphocytes with enlarged nuclei with inconspicuous nucleoli.  An adequate volume for flow cytometry on initial blood draw.  C-reactive protein less than 0.3.  PSA normal 3.69 glucose 119 otherwise unremarkable CMP.      -3/19/2021 [facility] hematology oncology initial consultation: We will get peripheral blood flow cytometry flow cytometric hematolymphoid neoplasia assessment including ZAP70/CD38, cytogenetics, B-cell rearrangement, immunoglobulin variable heavy chain and p53 mutational analysis sequencing will be done, as well as snip MicroArray for CLL.  Absolute neutrophil count is normal at 4350 with absolute lymphocyte count 32,040.  Without frequent infections I will not check quantitative immunoglobulins.  CT scans are not necessary for diagnosis, surveillance, routine monitoring of treatment response,  or progression.  CT scans may be warranted if symptoms of bulky disease or the assessment of risk for tumor lysis syndrome prior to initiation of venetoclax might be considered.  We will check lactate dehydrogenase, beta-2 microglobulin and uric acid.  Lymphocytosis itself is not an indication for treatment.  NCCN guideline indications for treatment include:  Fatigue severe  Night sweats  Unexplained weight loss  Fever without infection  Threatened endorgan function due to bulk  Spleen greater than 6 cm below costal margin  Lymph nodes greater than 10 cm  Progressive nonhemolytic anemia hemoglobin less than 10 (hemolytic anemia treated with steroids)  Progressive nonimmune mediated destruction thrombocytopenia platelets less than 100,000 (ITP)  Steroid refractory cytopenias    If these thresholds are reached, the molecular mutational analysis I am doing will guide the specific therapy I recommend.  Presently he does not have any of those clinical thresholds.  He does have swollen MCP and PIP joints with brothers who have psoriatic arthritis and I will check his sedimentation rate, C-reactive protein, rheumatoid factor, and extractable nuclear antigens as well and ultimately may need rheumatological referral.  Rheumatologic diseases can cause some lymphocytosis including monoclonal lymphocytic process but this would be higher lymphocyte count than I would expect from such processes.    -3/19/2021 data:  White count 36,900, hemoglobin 14.2, platelets 154,000, absolute neutrophil count 7900, absolute lymphocyte count 26,500.  CMP glucose 112, bicarb 32, otherwise unremarkable with creatinine 1.19 and normal liver enzymes.  Magnesium 2.1  Hemolytic panel: MARIS negative.  .  Uric acid normal 6.8.  ELLIE negative.  Rheumatoid factor negative.  proBNP normal 6.1  Sedimentation rate less than 1 with C-reactive protein 0.38.  Beta-2 microglobulin 3.5 upper limit 2.2  Flow cytometry: Clonal CD5 positive B-cell population  70% of analyzed cells consistent with CLL/SLL  ZAP70 positive/CD 38+: Double positive considered unfavorable and tends to correlate with immunoglobulin variable heavy chain unmutated status.  Immunoglobulin variable heavy chain somatic hyper mutation analysis did not yield interpretable results.  Cancer cytogenetic analysis showed 47, XY, +12 in all 20 metaphases cells.  CLL FISH panel positive for trisomy 12 which is detected in 20% of B-cell CLL which is associated with an intermediate-poor prognosis  B-cell immunoglobulin heavy and kappa light chain gene rearrangements detected corroborating of B-cell neoplasm.  T p53 mutation analysis negative by DNA sequencing.    -4/9/2021 [facility] medical oncology follow-up visit: I reviewed the above molecular data with the patient.  The ZAP70 and CD38 and trisomy 12 positivity put him at an intermediate to high risk of progression I will watch him fairly closely.  I will repeat his CBC and CMP and physical exam in 2 months.  Thresholds for therapy as outlined above.  Ultimately what we choose to treat at that point depends on the COVID-19 pandemic status as much as his disease markers but he has not p53 mutated so we have a broader range of options.    -6/10/2021 [facility] hematology follow-up visit: 6/4/2021 data reveals white count 43,850 with hemoglobin 12.8 platelets 147,000.  Absolute neutrophil count normal 4820.  No bulky adenopathy.  We will repeat labs again in 2 months.  Indications/thresholds for therapy for CLL as indicated above have not been reached.    -9/10/2021 [facility] medical oncology follow-up visit: White count today is 52,700.  Hemoglobin is 13.9 with platelets 196,000 and no bulky adenopathy, fevers, chills, unexplained weight loss, effusions, bed drenching night sweats.  Hence has not reached NCCN guideline indications for treatment as outlined above.  We will see him back in 3 months with monthly CBC in the interim.  He has not had doubling of his  lymphocyte count in less than 6 months either.  Recommended Covid vaccination with booster.    -12/20/2021 [facility] Oncology clinic follow-up:  Continues to do well on surveillance, white count stable currently at 59,100, absolute lymphocytes 51.42, no anemia or thrombocytopenia.  He has no lymphadenopathy or other symptoms suggestive of disease progression.  We will continue with monthly CBC.  We will see him back in 3 months for follow-up.  If counts remain stable at that time we may be able to extend the time frame of checking his CBC out a little bit.  Indications for treatment per NCCN guidelines are listed above note dated 3/19/2021.    -3/21/2022 [facility] medical oncology follow-up visit: Clinically he is doing well with no new somatic complaints.  His white count is actually lower over the last 3 months now 53,930 with a hemoglobin of 12.4 and platelets 166,000 with absolute neutrophil count quite adequate at 2700 and no frequent infections.  We will check his CBC in 3 months and again just prior to return in 6 months given stability of counts over time with no significant rise in white count and no triggers for need of treatment.    -9/30/2022 [facility] Oncology/Hematology clinic follow-up: Mr. Walsh continues to do well, still no indication for treatment.  CBC is stable with WBC 62,590, mild anemia with hemoglobin of 12.5, hematocrit 37.2%, MCV normal at 96.6, platelet count normal at 149,000.  He has no new worrisome symptoms.  No lymphadenopathy.  We will continue with observation, we will repeat CBC every 3 months and I have ordered that today.  We will see him back in 6 months for follow-up.    - 2/21/2023 follow-up Dr.Saini MORRIS ENT.  He had transsphenoidal hypophysis ectomy with right nasoseptal flap 1/18/2023.  Nasal congestion improved.  No watery drainage.  Back on CPAP.  Slow improvement of smell.  Had large sellar mass.  Found on evaluation by Dr. Bell endocrinology at [facility].  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  Postop MRI showed possible residual near cavernous sinus Per Sammy review of postop.  Improvement in visual field cuts.  Following with Dr. Bell.  Follow-up with Dr. Christianson with cerebrovascular, skull-based, and endovascular neurosurgical group at  mid-March.    -2/24/2023 [facility] medical oncology hematology follow-up:White count today virtually identical to September 2022… White count 62,360.  Hemoglobin slightly lower at 10.9 but with normochromic normocytic indices and platelets normal 159,000 with absolute lymphocyte count 54,540 and normal absolute neutrophil count 5160.  No bulky palpable cervical axillary or inguinal adenopathy.  No frequent infections.  No hint of this progressing CLL.  We will repeat his labs on return in 6 months.    -8/29/2023 [facility] Hematology/Oncology clinic follow-up: Mr. Walsh is doing well, he has no new worrisome symptoms, his CBC is stable as shown above.  WBC slightly higher at 75,870, hemoglobin is good at 12.6 with hematocrit 37.6%, platelets 122,000, absolute lymphocytes 67.42.  He has had no doubling of his counts.  He has no adenopathy, no frequent infections, no unusual fatigue.  We will continue monitoring CBC, I will check it again in 3 months since his platelets dropped somewhat but they are still well above 100,000, will also check on return in 6 months.    -2/29/2024 [facility] hematology oncology follow-up: He did not have CBC done since last we saw him.White count is 83,620 with hemoglobin 11.6 fairly stable over time.  Platelets slightly lower at 111,000.  Absolute lymphocyte count 74,820 up from 54,000 a year ago hence lymphocyte count doubling time is not 6 months or less and he still has no bulky adenopathy on exam or hemoglobin less than 10 hemolytic in nature or platelet count less than 100,000 not due to ITP.  No unexplained fatigue or frequent  "infections or unexplained B symptoms will repeat CBC in 6 months with no current indication for treatment.    -8/30/2024 [facility] hematology clinic follow-up: Sigifredo overall is doing well.  CBC reviewed from yesterday, WBC continues to climb, currently WBC 97.14 with absolute lymphocytes 91.31 compared to WBC 6 months ago of 83.62 with absolute lymphocytes 74.82, no significant anemia, hemoglobin currently 12.1 with hematocrit 37.7%, platelet count acceptable to 125,000, ANC 5.83.  He has no \"B symptoms\".  He has no lymphadenopathy, no abnormal weight loss, no frequent illnesses or infections.  No unusual fatigue.  We will continue monitoring however I will check his CBC again in 3 months for a little closer surveillance.    Return to clinic in 3 months for follow-up    I spent 30 minutes caring for Sigifredo on this date of service. This time includes time spent by me in the following activities: preparing for the visit, reviewing tests, performing a medically appropriate examination and/or evaluation, counseling and educating the patient/family/caregiver, ordering medications, tests, or procedures, referring and communicating with other health care professionals, documenting information in the medical record, independently interpreting results and communicating that information with the patient/family/caregiver, and labs discussed with Dr. Levi Saunders at time of visit .     Huma Somers, APRN    08/30/2024        "

## 2024-09-16 ENCOUNTER — OFFICE VISIT (OUTPATIENT)
Dept: ENDOCRINOLOGY | Facility: CLINIC | Age: 64
End: 2024-09-16
Payer: COMMERCIAL

## 2024-09-16 VITALS
DIASTOLIC BLOOD PRESSURE: 64 MMHG | BODY MASS INDEX: 32.62 KG/M2 | OXYGEN SATURATION: 95 % | WEIGHT: 233 LBS | HEIGHT: 71 IN | HEART RATE: 67 BPM | SYSTOLIC BLOOD PRESSURE: 118 MMHG

## 2024-09-16 DIAGNOSIS — E23.0 HYPOGONADOTROPIC HYPOGONADISM: ICD-10-CM

## 2024-09-16 DIAGNOSIS — D35.2 PITUITARY MACROADENOMA WITH EXTRASELLAR EXTENSION: Primary | ICD-10-CM

## 2024-09-16 PROCEDURE — 99214 OFFICE O/P EST MOD 30 MIN: CPT | Performed by: INTERNAL MEDICINE

## 2024-09-16 RX ORDER — CHLORTHALIDONE 25 MG/1
25 TABLET ORAL DAILY
Qty: 30 TABLET | Refills: 0 | Status: SHIPPED | OUTPATIENT
Start: 2024-09-16

## 2024-09-19 ENCOUNTER — TELEPHONE (OUTPATIENT)
Dept: ENDOCRINOLOGY | Facility: CLINIC | Age: 64
End: 2024-09-19
Payer: COMMERCIAL

## 2024-09-19 ENCOUNTER — LAB (OUTPATIENT)
Dept: ENDOCRINOLOGY | Facility: CLINIC | Age: 64
End: 2024-09-19
Payer: COMMERCIAL

## 2024-09-19 DIAGNOSIS — D35.2 PITUITARY MACROADENOMA WITH EXTRASELLAR EXTENSION: ICD-10-CM

## 2024-09-19 LAB
ALBUMIN SERPL-MCNC: 4.3 G/DL (ref 3.5–5.2)
ALBUMIN/GLOB SERPL: 1.8 G/DL
ALP SERPL-CCNC: 56 U/L (ref 39–117)
ALT SERPL W P-5'-P-CCNC: 13 U/L (ref 1–41)
ANION GAP SERPL CALCULATED.3IONS-SCNC: 9.5 MMOL/L (ref 5–15)
AST SERPL-CCNC: 13 U/L (ref 1–40)
BILIRUB SERPL-MCNC: 0.4 MG/DL (ref 0–1.2)
BUN SERPL-MCNC: 17 MG/DL (ref 8–23)
BUN/CREAT SERPL: 14.2 (ref 7–25)
CALCIUM SPEC-SCNC: 9.6 MG/DL (ref 8.6–10.5)
CHLORIDE SERPL-SCNC: 103 MMOL/L (ref 98–107)
CO2 SERPL-SCNC: 28.5 MMOL/L (ref 22–29)
CORTIS AM PEAK SERPL-MCNC: 7.84 MCG/DL
CREAT SERPL-MCNC: 1.2 MG/DL (ref 0.76–1.27)
DEPRECATED RDW RBC AUTO: 45.9 FL (ref 37–54)
EGFRCR SERPLBLD CKD-EPI 2021: 66.7 ML/MIN/1.73
ERYTHROCYTE [DISTWIDTH] IN BLOOD BY AUTOMATED COUNT: 13.1 % (ref 12.3–15.4)
FSH SERPL-ACNC: 4.48 MIU/ML
GLOBULIN UR ELPH-MCNC: 2.4 GM/DL
GLUCOSE SERPL-MCNC: 105 MG/DL (ref 65–99)
HCT VFR BLD AUTO: 36.1 % (ref 37.5–51)
HGB BLD-MCNC: 12.1 G/DL (ref 13–17.7)
LH SERPL-ACNC: 5.13 MIU/ML
MCH RBC QN AUTO: 32.1 PG (ref 26.6–33)
MCHC RBC AUTO-ENTMCNC: 33.5 G/DL (ref 31.5–35.7)
MCV RBC AUTO: 95.8 FL (ref 79–97)
PLATELET # BLD AUTO: 112 10*3/MM3 (ref 140–450)
PMV BLD AUTO: 10.2 FL (ref 6–12)
POTASSIUM SERPL-SCNC: 4.5 MMOL/L (ref 3.5–5.2)
PROT SERPL-MCNC: 6.7 G/DL (ref 6–8.5)
RBC # BLD AUTO: 3.77 10*6/MM3 (ref 4.14–5.8)
SODIUM SERPL-SCNC: 141 MMOL/L (ref 136–145)
TSH SERPL DL<=0.05 MIU/L-ACNC: 2.79 UIU/ML (ref 0.27–4.2)
WBC NRBC COR # BLD AUTO: 84.75 10*3/MM3 (ref 3.4–10.8)

## 2024-09-19 PROCEDURE — 84439 ASSAY OF FREE THYROXINE: CPT | Performed by: INTERNAL MEDICINE

## 2024-09-19 PROCEDURE — 36415 COLL VENOUS BLD VENIPUNCTURE: CPT | Performed by: INTERNAL MEDICINE

## 2024-09-19 PROCEDURE — 82533 TOTAL CORTISOL: CPT | Performed by: INTERNAL MEDICINE

## 2024-09-19 PROCEDURE — 83001 ASSAY OF GONADOTROPIN (FSH): CPT | Performed by: INTERNAL MEDICINE

## 2024-09-19 PROCEDURE — 84402 ASSAY OF FREE TESTOSTERONE: CPT | Performed by: INTERNAL MEDICINE

## 2024-09-19 PROCEDURE — 82024 ASSAY OF ACTH: CPT | Performed by: INTERNAL MEDICINE

## 2024-09-19 PROCEDURE — 83002 ASSAY OF GONADOTROPIN (LH): CPT | Performed by: INTERNAL MEDICINE

## 2024-09-19 PROCEDURE — 84443 ASSAY THYROID STIM HORMONE: CPT | Performed by: INTERNAL MEDICINE

## 2024-09-19 PROCEDURE — 84403 ASSAY OF TOTAL TESTOSTERONE: CPT | Performed by: INTERNAL MEDICINE

## 2024-09-19 PROCEDURE — 80053 COMPREHEN METABOLIC PANEL: CPT | Performed by: INTERNAL MEDICINE

## 2024-09-19 PROCEDURE — 85027 COMPLETE CBC AUTOMATED: CPT | Performed by: INTERNAL MEDICINE

## 2024-09-20 ENCOUNTER — HOSPITAL ENCOUNTER (OUTPATIENT)
Dept: CARDIOLOGY | Facility: HOSPITAL | Age: 64
Discharge: HOME | End: 2024-09-20
Admitting: STUDENT IN AN ORGANIZED HEALTH CARE EDUCATION/TRAINING PROGRAM
Payer: COMMERCIAL

## 2024-09-20 ENCOUNTER — OFFICE VISIT (OUTPATIENT)
Dept: INTERNAL MEDICINE | Facility: CLINIC | Age: 64
End: 2024-09-20
Payer: COMMERCIAL

## 2024-09-20 VITALS — BODY MASS INDEX: 32.2 KG/M2 | HEIGHT: 71 IN | WEIGHT: 230 LBS

## 2024-09-20 VITALS
TEMPERATURE: 97.8 F | WEIGHT: 230.8 LBS | DIASTOLIC BLOOD PRESSURE: 72 MMHG | OXYGEN SATURATION: 95 % | HEIGHT: 71 IN | HEART RATE: 72 BPM | BODY MASS INDEX: 32.31 KG/M2 | SYSTOLIC BLOOD PRESSURE: 112 MMHG

## 2024-09-20 DIAGNOSIS — L98.492 SKIN ULCER WITH FAT LAYER EXPOSED: ICD-10-CM

## 2024-09-20 DIAGNOSIS — E11.40 TYPE 2 DIABETES MELLITUS WITH DIABETIC NEUROPATHY, WITHOUT LONG-TERM CURRENT USE OF INSULIN: Chronic | ICD-10-CM

## 2024-09-20 DIAGNOSIS — C91.10 CLL (CHRONIC LYMPHOCYTIC LEUKEMIA): Primary | Chronic | ICD-10-CM

## 2024-09-20 DIAGNOSIS — M79.89 RIGHT LEG SWELLING: ICD-10-CM

## 2024-09-20 DIAGNOSIS — L97.212 VARICOSE VEINS OF RIGHT LOWER EXTREMITY WITH ULCER OF CALF WITH FAT LAYER EXPOSED: ICD-10-CM

## 2024-09-20 DIAGNOSIS — I83.012 VARICOSE VEINS OF RIGHT LOWER EXTREMITY WITH ULCER OF CALF WITH FAT LAYER EXPOSED: ICD-10-CM

## 2024-09-20 LAB
ACTH PLAS-MCNC: 61.9 PG/ML (ref 7.2–63.3)
BH CV LOW VAS RIGHT GREATER SAPH BK VESSEL: 1
BH CV LOW VAS RIGHT PROXIMAL FEMORAL SPONT: 1
BH CV LOW VAS RIGHT SAPHENOFEMORAL JUNCTION SPONT: 1
BH CV LOW VAS RIGHT VARICOSITY BK VESSEL: 1
BH CV LOWER VASCULAR LEFT COMMON FEMORAL AUGMENT: NORMAL
BH CV LOWER VASCULAR LEFT COMMON FEMORAL COMPETENT: NORMAL
BH CV LOWER VASCULAR LEFT COMMON FEMORAL PHASIC: NORMAL
BH CV LOWER VASCULAR LEFT COMMON FEMORAL SPONT: NORMAL
BH CV LOWER VASCULAR RIGHT COMMON FEMORAL AUGMENT: NORMAL
BH CV LOWER VASCULAR RIGHT COMMON FEMORAL COMPETENT: NORMAL
BH CV LOWER VASCULAR RIGHT COMMON FEMORAL COMPRESS: NORMAL
BH CV LOWER VASCULAR RIGHT COMMON FEMORAL PHASIC: NORMAL
BH CV LOWER VASCULAR RIGHT COMMON FEMORAL SPONT: NORMAL
BH CV LOWER VASCULAR RIGHT DISTAL FEMORAL COMPRESS: NORMAL
BH CV LOWER VASCULAR RIGHT GASTRONEMIUS COMPRESS: NORMAL
BH CV LOWER VASCULAR RIGHT GREATER SAPH AK COMPRESS: NORMAL
BH CV LOWER VASCULAR RIGHT GREATER SAPH BK COMPETENT: NORMAL
BH CV LOWER VASCULAR RIGHT GREATER SAPH BK COMPRESS: NORMAL
BH CV LOWER VASCULAR RIGHT LESSER SAPH COMPRESS: NORMAL
BH CV LOWER VASCULAR RIGHT MID FEMORAL AUGMENT: NORMAL
BH CV LOWER VASCULAR RIGHT MID FEMORAL COMPETENT: NORMAL
BH CV LOWER VASCULAR RIGHT MID FEMORAL COMPRESS: NORMAL
BH CV LOWER VASCULAR RIGHT MID FEMORAL PHASIC: NORMAL
BH CV LOWER VASCULAR RIGHT MID FEMORAL SPONT: NORMAL
BH CV LOWER VASCULAR RIGHT PERONEAL COMPRESS: NORMAL
BH CV LOWER VASCULAR RIGHT POPLITEAL AUGMENT: NORMAL
BH CV LOWER VASCULAR RIGHT POPLITEAL COMPETENT: NORMAL
BH CV LOWER VASCULAR RIGHT POPLITEAL COMPRESS: NORMAL
BH CV LOWER VASCULAR RIGHT POPLITEAL PHASIC: NORMAL
BH CV LOWER VASCULAR RIGHT POPLITEAL SPONT: NORMAL
BH CV LOWER VASCULAR RIGHT POSTERIOR TIBIAL COMPRESS: NORMAL
BH CV LOWER VASCULAR RIGHT PROXIMAL FEMORAL AUGMENT: NORMAL
BH CV LOWER VASCULAR RIGHT PROXIMAL FEMORAL COMPETENT: NORMAL
BH CV LOWER VASCULAR RIGHT PROXIMAL FEMORAL COMPRESS: NORMAL
BH CV LOWER VASCULAR RIGHT PROXIMAL FEMORAL PHASIC: NORMAL
BH CV LOWER VASCULAR RIGHT PROXIMAL FEMORAL SPONT: NORMAL
BH CV LOWER VASCULAR RIGHT SAPHENOFEMORAL JUNCTION COMPETENT: NORMAL
BH CV LOWER VASCULAR RIGHT SAPHENOFEMORAL JUNCTION COMPRESS: NORMAL
BH CV LOWER VASCULAR RIGHT VARICOSITY BK COMPETENT: NORMAL
BH CV LOWER VASCULAR RIGHT VARICOSITY BK COMPRESS: NORMAL
EXPIRATION DATE: ABNORMAL
HBA1C MFR BLD: 6.8 % (ref 4.5–5.7)
Lab: ABNORMAL

## 2024-09-20 PROCEDURE — 93971 EXTREMITY STUDY: CPT

## 2024-09-20 PROCEDURE — 99214 OFFICE O/P EST MOD 30 MIN: CPT | Performed by: STUDENT IN AN ORGANIZED HEALTH CARE EDUCATION/TRAINING PROGRAM

## 2024-09-20 PROCEDURE — 83036 HEMOGLOBIN GLYCOSYLATED A1C: CPT | Performed by: STUDENT IN AN ORGANIZED HEALTH CARE EDUCATION/TRAINING PROGRAM

## 2024-09-29 LAB
TESTOST FREE MFR SERPL: 2.37 % (ref 1.5–4.2)
TESTOST FREE SERPL-MCNC: 1.6 NG/DL (ref 5–21)
TESTOST SERPL-MCNC: 67.5 NG/DL (ref 264–916)

## 2024-10-01 PROBLEM — E23.0 HYPOGONADOTROPIC HYPOGONADISM: Status: ACTIVE | Noted: 2024-10-01

## 2024-10-01 LAB — T4 FREE SERPL DIALY-MCNC: 0.97 NG/DL

## 2024-10-01 RX ORDER — TESTOSTERONE 1.62 MG/G
GEL TRANSDERMAL
Qty: 75 G | Refills: 5 | Status: SHIPPED | OUTPATIENT
Start: 2024-10-01

## 2024-10-14 RX ORDER — CHLORTHALIDONE 25 MG/1
25 TABLET ORAL DAILY
Qty: 30 TABLET | Refills: 0 | Status: SHIPPED | OUTPATIENT
Start: 2024-10-14

## 2024-10-18 ENCOUNTER — TELEPHONE (OUTPATIENT)
Dept: ENDOCRINOLOGY | Facility: CLINIC | Age: 64
End: 2024-10-18
Payer: COMMERCIAL

## 2024-10-18 NOTE — TELEPHONE ENCOUNTER
Started PA for the Testosterone 1.62% gel for the pt. He is needing 2 Testosterone levels done to qualify.    Please advise.

## 2024-10-21 DIAGNOSIS — E23.0 HYPOGONADOTROPIC HYPOGONADISM: Primary | ICD-10-CM

## 2024-10-22 ENCOUNTER — OFFICE VISIT (OUTPATIENT)
Age: 64
End: 2024-10-22
Payer: COMMERCIAL

## 2024-10-22 VITALS
BODY MASS INDEX: 32.34 KG/M2 | TEMPERATURE: 97.9 F | WEIGHT: 231 LBS | HEIGHT: 71 IN | HEART RATE: 75 BPM | SYSTOLIC BLOOD PRESSURE: 108 MMHG | DIASTOLIC BLOOD PRESSURE: 52 MMHG

## 2024-10-22 DIAGNOSIS — C91.10 CLL (CHRONIC LYMPHOCYTIC LEUKEMIA): ICD-10-CM

## 2024-10-22 DIAGNOSIS — R22.41 LOCALIZED SWELLING OF TOE OF RIGHT FOOT: ICD-10-CM

## 2024-10-22 DIAGNOSIS — D35.2 PITUITARY MICROADENOMA: ICD-10-CM

## 2024-10-22 DIAGNOSIS — R20.0 NUMBNESS AND TINGLING IN BOTH HANDS: ICD-10-CM

## 2024-10-22 DIAGNOSIS — Z86.39 HISTORY OF DIABETES MELLITUS: ICD-10-CM

## 2024-10-22 DIAGNOSIS — R20.2 NUMBNESS AND TINGLING IN BOTH HANDS: ICD-10-CM

## 2024-10-22 DIAGNOSIS — R76.8 RHEUMATOID FACTOR POSITIVE: ICD-10-CM

## 2024-10-22 DIAGNOSIS — M15.9 GENERALIZED OSTEOARTHROSIS, INVOLVING MULTIPLE SITES: Primary | ICD-10-CM

## 2024-10-22 RX ORDER — MELOXICAM 15 MG/1
15 TABLET ORAL EVERY OTHER DAY
Qty: 90 TABLET | Refills: 1 | Status: SHIPPED | OUTPATIENT
Start: 2024-10-22

## 2024-10-22 NOTE — ASSESSMENT & PLAN NOTE
False-positive weakly positive rheumatoid factor.    Repeat rheumatoid factor negative    Nothing clinically to suggest rheumatoid arthritis/inflammatory arthritis.

## 2024-10-22 NOTE — ASSESSMENT & PLAN NOTE
[facility]; wife Nicole GUTIERREZ patient on Ote[employer]  [employer] [place]  Brothers with psoriatic arthritis  No psoriasis himself.  Weakly positive rheumatoid factor  Chronic pain right wrist, right knee  **Current:  Meloxicam every other day(slight renal insufficiency August 2024).    Presently I find no evidence of rheumatoid arthritis, psoriatic arthritis or inflammatory arthritis  He has no features of lupus or connective tissue disease.    He likely has osteoarthritis causing right knee pain and stiffness  Joints feel improved with meloxicam every other day(decreased every other day with renal insufficiency August 2024 labs that has returned to normal).  Recommend meloxicam every other day alternating with topical diclofenac as needed for knee pain   Risks of NSAIDs discussed including GI upset, GI bleeding, renal and hepatic risks and the risks of cardiovascular disease and stroke. Warned patient not to take with other NSAIDs including OTC NSAIDs.    He had ingrown right great toenail removed by podiatry July 2024 and treated with oral antibiotics.  The right great toe has been swollen and red around the nailbed ever since.  It is not very painful except for when he wears compression stockings.  Differential diagnosis for right great toe swelling includes infection/cellulitis/osteomyelitis, less likely dactylitis as he has no other features to suggest psoriatic arthritis.  There is no history of gout  -Obtain x-rays feet today to look for any erosions or sign of osteomyelitis  -Labs ordered for monitoring as below  -Follow-up with podiatrist and consult orthopedics for right great toe swelling and redness with suspicion for infectious etiology related to ingrown toenail in the setting of diabetes  -Consider MRI of the right foot to rule out great toe osteomyelitis if it does not improve

## 2024-10-22 NOTE — ASSESSMENT & PLAN NOTE
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 Bell

## 2024-10-22 NOTE — PROGRESS NOTES
Office Follow Up      Date: 10/22/2024   Patient Name: Sigifredo Walsh  MRN: [id number]  YOB: 1960    Referring Physician: No ref. provider found     Chief Complaint:   Chief Complaint   Patient presents with    Osteoarthrosis       History of Present Illness: Sigifredo Walsh is a 64 y.o. 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  He has a brother with psoriatic arthritis and wants to get checked out to make sure he does not have psoriatic arthritis.  He has never had psoriasis himself.  He does not notice any swelling to his joints.  No hot red joints.  No history of gout.  His major trouble is pain and stiffness in his right knee and right wrist.  Activity makes his knee pain and stiffness worse.  Rest makes it better.  He uses over-the-counter ibuprofen intermittently for the joint pain with some relief.  No swelling warmth or effusion to the knees.  His hands sometimes go numb particularly when he is driving or doing repetitive motions.  He uses carpal tunnel wrist splints at night.   He is concerned he has carpal tunnel syndrome.  He has not been evaluated by Orthopedics or had nerve conduction study.      No fevers night sweats or weight loss.  He is fatigued at times.  No history of dactylitis.  No pitting of his nails.  No malar rash.  No oral nasal ulcers.  No pleurisy pericarditis.  No renal abnormalities.  No clotting disorder.  No seizure disorder.    He follows with [facility] On[facility] Dr. Saunders for his CLL.  Just monitoring CLL.  No treatment for this presently.  He follows with [facility] for his history of pituitary adenoma  status post removal  He follows with endocrinology Dr. Bell for diabetes and history of pituitary adenoma    Interim 10/22/2024: He had ingrown toenail removed from the right great toe July 2024.  He was treated with antibiotics by podiatry. It has been swollen and red ever since.  He wonders if this is dactylitis/sausage toe.  minimal pain in the right great toe except for when he wears compression stockings.  He has lower extremity edema and plans to have vein studies.  No rashes.      Subjective       Review of Systems: Review of Systems   Constitutional:  Positive for fatigue.   HENT:  Positive for hearing loss and tinnitus.    Respiratory:  Positive for apnea.    Genitourinary:  Positive for erectile dysfunction.   Hematological:  Bruises/bleeds easily.        Medications:   Current Outpatient Medications:     cetirizine (zyrTEC) 10 MG tablet, Take 1 tablet by mouth As Needed for Allergies., Disp: , Rfl:     chlorthalidone (HYGROTON) 25 MG tablet, Take 1 tablet by mouth once daily, Disp: 30 tablet, Rfl: 0    Diclofenac Sodium (VOLTAREN) 1 % gel gel, Apply 4 g topically to the appropriate area as directed 4 (Four) Times a Day As Needed., Disp: , Rfl:     GLUCOSAMINE-CHONDROITIN PO, Take 2 tablets by mouth Daily., Disp: , Rfl:     lisinopril (PRINIVIL,ZESTRIL) 10 MG tablet, Take 1 tablet by mouth once daily, Disp: 30 tablet, Rfl: 5    meloxicam (MOBIC) 15 MG tablet, Take 1 tablet by mouth Every Other Day., Disp: 90 tablet, Rfl: 1    metFORMIN (GLUCOPHAGE) 500 MG tablet, Take 1 tablet by mouth 2 (Two) Times a Day With Meals., Disp: 60 tablet, Rfl: 5    multivitamin with minerals tablet tablet, Take 1 tablet by mouth Daily. Ran out, Disp: , Rfl:     Testosterone (AndroGel Pump) 20.25 MG/ACT (1.62%) gel, Apply 40.5 mg (2 pumps) to dry, intact skin of the shoulders or upper arms, Disp: 75 g, Rfl: 5    Triamcinolone Acetonide (NASACORT ALLERGY 24HR NA), 1 spray into the nostril(s) as directed by provider Daily.,  "Disp: , Rfl:     Allergies: No Known Allergies    I have reviewed and updated the patient's chief complaint, history of present illness, review of systems, past medical history, surgical history, family history, social history, medications and allergy list as appropriate.     Objective        Vital Signs:   Vitals:    10/22/24 1445   BP: 108/52   BP Location: Right arm   Patient Position: Sitting   Cuff Size: Adult   Pulse: 75   Temp: 97.9 °F (36.6 °C)   Weight: 105 kg (231 lb)   Height: 180.3 cm (70.98\")   PainSc:   7     Body mass index is 32.23 kg/m².      Physical Exam:  Physical Exam   MUSCULOSKELETAL:   No peripheral synovitis.  No dactylitis fingers.  No pitting of the nails.  No rheumatoid nodules or tophi  Scattered Heberden and Killian's nodes present hands  Positive Tinel and Phalen's testing bilateral wrists.  No muscle wasting hands.  Crepitus and painful range of motion bilateral knee.  No warmth or effusion.  Right great toe with erythema around the nailbed and some soft tissue swelling.  No drainage.  No ulcer.  Hammertoes present feet.    Complete joint exam was performed including the MCPs, PIPs, DIPs of the hands, wrists, elbows, shoulders, hips, knees and ankles.  No soft tissue swelling or tenderness is present except as above.    General: The patient is well-developed and well nourished. Cooperative, alert and oriented. Affect is normal. Hydration appears normal.   HEENT: Normocephalic and atraumatic. No notable alopecia. Lids and conjunctiva are normal. Pupils are equal and sclera are clear. Oropharynx is clear   NECK neck is supple without adenopathy, masses or thyromegaly.   CARDIOVASCULAR: Regular rate and rhythm. No murmurs, rubs or gallops   LUNGS: Effort is normal. Lungs are clear bilateral   ABDOMEN: Not examined  EXTREMITIES: Peripheral pulses are intact. No clubbing.   SKIN: No rashes. No subcutaneous nodules. No digital ulcers. No sclerodactyly.   NEUROLOGIC: Gait is normal. " "Strength testing is normal.  No focal neurologic deficits    Results Review:   Labs:   Lab Results   Component Value Date    GLUCOSE 105 (H) 09/19/2024    BUN 17 09/19/2024    CREATININE 1.20 09/19/2024    EGFR 66.7 09/19/2024    BCR 14.2 09/19/2024    K 4.5 09/19/2024    CO2 28.5 09/19/2024    CALCIUM 9.6 09/19/2024    ALBUMIN 4.3 09/19/2024    BILITOT 0.4 09/19/2024    AST 13 09/19/2024    ALT 13 09/19/2024     Lab Results   Component Value Date    WBC 84.75 (C) 09/19/2024    HGB 12.1 (L) 09/19/2024    HCT 36.1 (L) 09/19/2024    MCV 95.8 09/19/2024     (L) 09/19/2024     Lab Results   Component Value Date    SEDRATE <1 03/19/2021     Lab Results   Component Value Date    CRP 0.38 03/19/2021     No results found for: \"QUANTIFERO\", \"QUANTITB1\", \"QUANTITB2\", \"QUANTIFERN\", \"QUANTIFERM\", \"QUANTITBGLDP\"  No results found for: \"RF\"  Lab Results   Component Value Date    HEPCVIRUSABY Non-Reactive 03/08/2021         Procedures    Assessment / Plan        Assessment & Plan  Localized swelling of toe of right foot    Generalized osteoarthrosis, involving multiple sites   [facility]; [facility] Nicole ACL [facility]  Brothers with psoriatic arthritis  No psoriasis himself.  Weakly positive rheumatoid factor  Chronic pain right wrist, right knee  **Current:  Meloxicam every other day(slight renal insufficiency August 2024).    Presently I find no evidence of rheumatoid arthritis, psoriatic arthritis or inflammatory arthritis  He has no features of lupus or connective tissue disease.    He likely has osteoarthritis causing right knee pain and stiffness  Joints feel improved with meloxicam every other day(decreased every other day with renal insufficiency August 2024 labs that has returned to normal).  Recommend meloxicam every other day alternating with topical diclofenac as needed for knee pain   Risks of NSAIDs discussed including GI upset, GI bleeding, renal and hepatic risks " and the risks of cardiovascular disease and stroke. Warned patient not to take with other NSAIDs including OTC NSAIDs.    He had ingrown right great toenail removed by podiatry July 2024 and treated with oral antibiotics.  The right great toe has been swollen and red around the nailbed ever since.  It is not very painful except for when he wears compression stockings.  Differential diagnosis for right great toe swelling includes infection/cellulitis/osteomyelitis, less likely dactylitis as he has no other features to suggest psoriatic arthritis.  There is no history of gout  -Obtain x-rays feet today to look for any erosions or sign of osteomyelitis  -Labs ordered for monitoring as below  -Follow-up with podiatrist and consult orthopedics for right great toe swelling and redness with suspicion for infectious etiology related to ingrown toenail in the setting of diabetes  -Consider MRI of the right foot to rule out great toe osteomyelitis if it does not improve    Rheumatoid factor positive  False-positive weakly positive rheumatoid factor.    Repeat rheumatoid factor negative    Nothing clinically to suggest rheumatoid arthritis/inflammatory arthritis.  Numbness and tingling in both hands  [facility]   Positive Tinel and Phalen's testing wrists.    I suspect he likely has carpal tunnel syndrome causing weakness and intermittent numbness in his hands right greater than left.  Recommend carpal tunnel wrist splints at night.    CLL (chronic lymphocytic leukemia)  [facility] Dr. Saunders  Diagnosed 2021.    Chronically elevated white blood cell count   Following with [facility] Dr. 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 Bell  History of diabetes mellitus  Endocrinology Dr. Bell.    Orders Placed This Encounter   Procedures     XR Foot 3+ View Bilateral    CBC Auto Differential    Comprehensive Metabolic Panel    C-reactive Protein    Sedimentation Rate    Uric Acid    HLA-B27 Antigen    Ambulatory Referral to Orthopedic Surgery     New Medications Ordered This Visit   Medications    meloxicam (MOBIC) 15 MG tablet     Sig: Take 1 tablet by mouth Every Other Day.     Dispense:  90 tablet     Refill:  1           Follow Up:   Return in about 6 months (around 4/22/2025).        Corbin Gutierrez MD  [facility] [place]

## 2024-10-22 NOTE — ASSESSMENT & PLAN NOTE
[facility]   Positive Tinel and Phalen's testing wrists.    I suspect he likely has carpal tunnel syndrome causing weakness and intermittent numbness in his hands right greater than left.  Recommend carpal tunnel wrist splints at night.

## 2024-10-22 NOTE — ASSESSMENT & PLAN NOTE
[facility]/Oncology Dr. Saunders  Diagnosed 2021.    Chronically elevated white blood cell count   Following with [facility] Oncology Dr. Saunders

## 2024-11-01 ENCOUNTER — LAB (OUTPATIENT)
Dept: LAB | Facility: HOSPITAL | Age: 64
End: 2024-11-01
Payer: COMMERCIAL

## 2024-11-01 DIAGNOSIS — E23.0 HYPOGONADOTROPIC HYPOGONADISM: ICD-10-CM

## 2024-11-01 PROCEDURE — 84270 ASSAY OF SEX HORMONE GLOBUL: CPT

## 2024-11-01 PROCEDURE — 84402 ASSAY OF FREE TESTOSTERONE: CPT

## 2024-11-01 PROCEDURE — 84403 ASSAY OF TOTAL TESTOSTERONE: CPT

## 2024-11-02 LAB — SHBG SERPL-SCNC: 24.4 NMOL/L (ref 19.3–76.4)

## 2024-11-08 DIAGNOSIS — E23.0 HYPOGONADOTROPIC HYPOGONADISM: ICD-10-CM

## 2024-11-08 LAB
TESTOST FREE MFR SERPL: 3.02 % (ref 1.5–4.2)
TESTOST FREE SERPL-MCNC: 2.13 NG/DL (ref 5–21)
TESTOST SERPL-MCNC: 70.5 NG/DL (ref 264–916)

## 2024-11-08 RX ORDER — TESTOSTERONE 1.62 MG/G
GEL TRANSDERMAL
Qty: 75 G | Refills: 5 | Status: SHIPPED | OUTPATIENT
Start: 2024-11-08

## 2024-11-11 DIAGNOSIS — E23.0 HYPOGONADOTROPIC HYPOGONADISM: ICD-10-CM

## 2024-11-11 RX ORDER — TESTOSTERONE 1.62 MG/G
GEL TRANSDERMAL
Qty: 75 G | Refills: 5 | OUTPATIENT
Start: 2024-11-11

## 2024-11-11 NOTE — TELEPHONE ENCOUNTER
Laurie with [facility] in [facility] states they received this prescription but it was missing the frequency. They are needing a new prescription sent over.

## 2024-11-12 RX ORDER — CHLORTHALIDONE 25 MG/1
25 TABLET ORAL DAILY
Qty: 30 TABLET | Refills: 0 | Status: SHIPPED | OUTPATIENT
Start: 2024-11-12

## 2024-11-13 RX ORDER — MELOXICAM 15 MG/1
15 TABLET ORAL EVERY OTHER DAY
Qty: 90 TABLET | Refills: 1 | OUTPATIENT
Start: 2024-11-13

## 2024-11-14 ENCOUNTER — LAB (OUTPATIENT)
Dept: LAB | Facility: HOSPITAL | Age: 64
End: 2024-11-14
Payer: COMMERCIAL

## 2024-11-14 DIAGNOSIS — M15.9 GENERALIZED OSTEOARTHROSIS, INVOLVING MULTIPLE SITES: ICD-10-CM

## 2024-11-14 DIAGNOSIS — E11.40 TYPE 2 DIABETES MELLITUS WITH DIABETIC NEUROPATHY, WITHOUT LONG-TERM CURRENT USE OF INSULIN: Chronic | ICD-10-CM

## 2024-11-14 DIAGNOSIS — R76.8 RHEUMATOID FACTOR POSITIVE: ICD-10-CM

## 2024-11-14 DIAGNOSIS — C91.10 CLL (CHRONIC LYMPHOCYTIC LEUKEMIA): ICD-10-CM

## 2024-11-14 DIAGNOSIS — R22.41 LOCALIZED SWELLING OF TOE OF RIGHT FOOT: ICD-10-CM

## 2024-11-14 LAB
ALBUMIN SERPL-MCNC: 4.1 G/DL (ref 3.5–5.2)
ALBUMIN UR-MCNC: <1.2 MG/DL
ALBUMIN/GLOB SERPL: 1.6 G/DL
ALP SERPL-CCNC: 55 U/L (ref 39–117)
ALT SERPL W P-5'-P-CCNC: 18 U/L (ref 1–41)
ANION GAP SERPL CALCULATED.3IONS-SCNC: 12 MMOL/L (ref 5–15)
ANISOCYTOSIS BLD QL: ABNORMAL
AST SERPL-CCNC: 13 U/L (ref 1–40)
BILIRUB SERPL-MCNC: 0.3 MG/DL (ref 0–1.2)
BUN SERPL-MCNC: 18 MG/DL (ref 8–23)
BUN/CREAT SERPL: 16.4 (ref 7–25)
CALCIUM SPEC-SCNC: 9.6 MG/DL (ref 8.6–10.5)
CHLORIDE SERPL-SCNC: 103 MMOL/L (ref 98–107)
CO2 SERPL-SCNC: 27 MMOL/L (ref 22–29)
CREAT SERPL-MCNC: 1.1 MG/DL (ref 0.76–1.27)
CRP SERPL-MCNC: 0.38 MG/DL (ref 0–0.5)
DEPRECATED RDW RBC AUTO: 45.2 FL (ref 37–54)
EGFRCR SERPLBLD CKD-EPI 2021: 74 ML/MIN/1.73
ERYTHROCYTE [DISTWIDTH] IN BLOOD BY AUTOMATED COUNT: 13 % (ref 12.3–15.4)
ERYTHROCYTE [SEDIMENTATION RATE] IN BLOOD: 3 MM/HR (ref 0–20)
GLOBULIN UR ELPH-MCNC: 2.5 GM/DL
GLUCOSE SERPL-MCNC: 106 MG/DL (ref 65–99)
HCT VFR BLD AUTO: 36.7 % (ref 37.5–51)
HGB BLD-MCNC: 12.3 G/DL (ref 13–17.7)
LYMPHOCYTES # BLD MANUAL: 78.94 10*3/MM3 (ref 0.7–3.1)
LYMPHOCYTES NFR BLD MANUAL: 5 % (ref 5–12)
MCH RBC QN AUTO: 32 PG (ref 26.6–33)
MCHC RBC AUTO-ENTMCNC: 33.5 G/DL (ref 31.5–35.7)
MCV RBC AUTO: 95.6 FL (ref 79–97)
MONOCYTES # BLD: 4.44 10*3/MM3 (ref 0.1–0.9)
NEUTROPHILS # BLD AUTO: 5.32 10*3/MM3 (ref 1.7–7)
NEUTROPHILS NFR BLD MANUAL: 6 % (ref 42.7–76)
PLATELET # BLD AUTO: 131 10*3/MM3 (ref 140–450)
PMV BLD AUTO: 9.7 FL (ref 6–12)
POIKILOCYTOSIS BLD QL SMEAR: ABNORMAL
POTASSIUM SERPL-SCNC: 4.9 MMOL/L (ref 3.5–5.2)
PROT SERPL-MCNC: 6.6 G/DL (ref 6–8.5)
RBC # BLD AUTO: 3.84 10*6/MM3 (ref 4.14–5.8)
SMALL PLATELETS BLD QL SMEAR: ABNORMAL
SODIUM SERPL-SCNC: 142 MMOL/L (ref 136–145)
URATE SERPL-MCNC: 7.3 MG/DL (ref 3.4–7)
VARIANT LYMPHS NFR BLD MANUAL: 8 % (ref 0–5)
VARIANT LYMPHS NFR BLD MANUAL: 81 % (ref 19.6–45.3)
WBC MORPH BLD: NORMAL
WBC NRBC COR # BLD AUTO: 88.7 10*3/MM3 (ref 3.4–10.8)

## 2024-11-14 PROCEDURE — 86140 C-REACTIVE PROTEIN: CPT

## 2024-11-14 PROCEDURE — 80053 COMPREHEN METABOLIC PANEL: CPT

## 2024-11-14 PROCEDURE — 84550 ASSAY OF BLOOD/URIC ACID: CPT

## 2024-11-14 PROCEDURE — 85025 COMPLETE CBC W/AUTO DIFF WBC: CPT

## 2024-11-14 PROCEDURE — 81374 HLA I TYPING 1 ANTIGEN LR: CPT

## 2024-11-14 PROCEDURE — 85652 RBC SED RATE AUTOMATED: CPT

## 2024-11-14 PROCEDURE — 82043 UR ALBUMIN QUANTITATIVE: CPT

## 2024-11-14 PROCEDURE — 85007 BL SMEAR W/DIFF WBC COUNT: CPT

## 2024-11-15 ENCOUNTER — TELEPHONE (OUTPATIENT)
Age: 64
End: 2024-11-15
Payer: COMMERCIAL

## 2024-11-15 NOTE — TELEPHONE ENCOUNTER
Lab called to notify us of critical lab result. WBC was 88.70. Patient has chronically elevated WBC due to CLL. Follows with oncology Dr. Saunders. [facility] notified of result and patient notified via [employer].

## 2024-11-21 LAB — HLA-B27 QL NAA+PROBE: NEGATIVE

## 2024-11-22 ENCOUNTER — OFFICE VISIT (OUTPATIENT)
Dept: ONCOLOGY | Facility: CLINIC | Age: 64
End: 2024-11-22
Payer: COMMERCIAL

## 2024-11-22 VITALS
SYSTOLIC BLOOD PRESSURE: 111 MMHG | TEMPERATURE: 98 F | OXYGEN SATURATION: 96 % | HEIGHT: 71 IN | BODY MASS INDEX: 32.06 KG/M2 | DIASTOLIC BLOOD PRESSURE: 67 MMHG | WEIGHT: 229 LBS | HEART RATE: 68 BPM | RESPIRATION RATE: 18 BRPM

## 2024-11-22 DIAGNOSIS — C91.10 CLL (CHRONIC LYMPHOCYTIC LEUKEMIA): Primary | Chronic | ICD-10-CM

## 2024-11-22 PROCEDURE — 99213 OFFICE O/P EST LOW 20 MIN: CPT | Performed by: INTERNAL MEDICINE

## 2024-11-22 NOTE — PROGRESS NOTES
CHIEF COMPLAINT: No new somatic complaints today with no bed drenching night sweats and no unexplained weight loss.    Problem List:  Oncology/Hematology History Overview Note   1.  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  Tinnitus  3.  Fungal nail infection  4.  Osteoarthritis.  Rheumatoid factor initially weakly positive negative on repeat.  Seeing Dr. Corbin Gutierrez.  5.  Sleep apnea on CPAP  6.  History of right inguinal herniorrhaphy  7.  Diverticulosis  8.  Hypertension  9.  Status post transsphenoidal hypophysectomy in for nonfunctional pituitary macroadenoma with lateral field cuts improved postop    Hematology history timeline:  -3/8/2021 white count 39,560 with hemoglobin 14.1, platelets 138,000, 81% lymphocytes.  Peripheral smear shows smudge cells with atypical lymphocytes with enlarged nuclei with inconspicuous nucleoli.  An adequate volume for flow cytometry on initial blood draw.  C-reactive protein less than 0.3.  PSA normal 3.69 glucose 119 otherwise unremarkable CMP.      -3/19/2021 [facility] hematology oncology initial consultation: We will get peripheral blood flow cytometry flow cytometric hematolymphoid neoplasia assessment including ZAP70/CD38, cytogenetics, B-cell rearrangement, immunoglobulin variable heavy chain and p53 mutational analysis sequencing will be done, as well as snip MicroArray for CLL.  Absolute neutrophil count is normal at 4350 with absolute lymphocyte count 32,040.  Without frequent infections I will not check quantitative immunoglobulins.  CT scans are not necessary for diagnosis, surveillance, routine monitoring of treatment response, or progression.  CT scans may be warranted if symptoms of bulky disease or the assessment of risk for tumor lysis syndrome prior to initiation of venetoclax might be considered.  We will check lactate dehydrogenase, beta-2 microglobulin and uric  acid.  Lymphocytosis itself is not an indication for treatment.  NCCN guideline indications for treatment include:  Fatigue severe  Night sweats  Unexplained weight loss  Fever without infection  Threatened endorgan function due to bulk  Spleen greater than 6 cm below costal margin  Lymph nodes greater than 10 cm  Progressive nonhemolytic anemia hemoglobin less than 10 (hemolytic anemia treated with steroids)  Progressive nonimmune mediated destruction thrombocytopenia platelets less than 100,000 (ITP)  Steroid refractory cytopenias    If these thresholds are reached, the molecular mutational analysis I am doing will guide the specific therapy I recommend.  Presently he does not have any of those clinical thresholds.  He does have swollen MCP and PIP joints with brothers who have psoriatic arthritis and I will check his sedimentation rate, C-reactive protein, rheumatoid factor, and extractable nuclear antigens as well and ultimately may need rheumatological referral.  Rheumatologic diseases can cause some lymphocytosis including monoclonal lymphocytic process but this would be higher lymphocyte count than I would expect from such processes.    -3/19/2021 data:  White count 36,900, hemoglobin 14.2, platelets 154,000, absolute neutrophil count 7900, absolute lymphocyte count 26,500.  CMP glucose 112, bicarb 32, otherwise unremarkable with creatinine 1.19 and normal liver enzymes.  Magnesium 2.1  Hemolytic panel: MARIS negative.  .  Uric acid normal 6.8.  ELLIE negative.  Rheumatoid factor negative.  proBNP normal 6.1  Sedimentation rate less than 1 with C-reactive protein 0.38.  Beta-2 microglobulin 3.5 upper limit 2.2  Flow cytometry: Clonal CD5 positive B-cell population 70% of analyzed cells consistent with CLL/SLL  ZAP70 positive/CD 38+: Double positive considered unfavorable and tends to correlate with immunoglobulin variable heavy chain unmutated status.  Immunoglobulin variable heavy chain somatic hyper  mutation analysis did not yield interpretable results.  Cancer cytogenetic analysis showed 47, XY, +12 in all 20 metaphases cells.  CLL FISH panel positive for trisomy 12 which is detected in 20% of B-cell CLL which is associated with an intermediate-poor prognosis  B-cell immunoglobulin heavy and kappa light chain gene rearrangements detected corroborating of B-cell neoplasm.  T p53 mutation analysis negative by DNA sequencing.    -4/9/2021 [facility] medical oncology follow-up visit: I reviewed the above molecular data with the patient.  The ZAP70 and CD38 and trisomy 12 positivity put him at an intermediate to high risk of progression I will watch him fairly closely.  I will repeat his CBC and CMP and physical exam in 2 months.  Thresholds for therapy as outlined above.  Ultimately what we choose to treat at that point depends on the COVID-19 pandemic status as much as his disease markers but he has not p53 mutated so we have a broader range of options.    -6/10/2021 [facility] hematology follow-up visit: 6/4/2021 data reveals white count 43,850 with hemoglobin 12.8 platelets 147,000.  Absolute neutrophil count normal 4820.  No bulky adenopathy.  We will repeat labs again in 2 months.  Indications/thresholds for therapy for CLL as indicated above have not been reached.    -9/10/2021 [facility] medical oncology follow-up visit: White count today is 52,700.  Hemoglobin is 13.9 with platelets 196,000 and no bulky adenopathy, fevers, chills, unexplained weight loss, effusions, bed drenching night sweats.  Hence has not reached NCCN guideline indications for treatment as outlined above.  We will see him back in 3 months with monthly CBC in the interim.  He has not had doubling of his lymphocyte count in less than 6 months either.  Recommended Covid vaccination with booster.    -12/20/2021 [facility] Oncology clinic follow-up:  Continues to do well on surveillance, white count stable currently at 59,100, absolute lymphocytes  51.42, no anemia or thrombocytopenia.  He has no lymphadenopathy or other symptoms suggestive of disease progression.  We will continue with monthly CBC.  We will see him back in 3 months for follow-up.  If counts remain stable at that time we may be able to extend the time frame of checking his CBC out a little bit.  Indications for treatment per NCCN guidelines are listed above note dated 3/19/2021.    -3/21/2022 [facility] medical oncology follow-up visit: Clinically he is doing well with no new somatic complaints.  His white count is actually lower over the last 3 months now 53,930 with a hemoglobin of 12.4 and platelets 166,000 with absolute neutrophil count quite adequate at 2700 and no frequent infections.  We will check his CBC in 3 months and again just prior to return in 6 months given stability of counts over time with no significant rise in white count and no triggers for need of treatment.    -9/30/2022 [facility] Oncology/Hematology clinic follow-up: Mr. Walsh continues to do well, still no indication for treatment.  CBC is stable with WBC 62,590, mild anemia with hemoglobin of 12.5, hematocrit 37.2%, MCV normal at 96.6, platelet count normal at 149,000.  He has no new worrisome symptoms.  No lymphadenopathy.  We will continue with observation, we will repeat CBC every 3 months and I have ordered that today.  We will see him back in 6 months for follow-up.    - 2/21/2023 follow-up Dr.Saini MORRIS ENT.  He had transsphenoidal hypophysis ectomy with right nasoseptal flap 1/18/2023.  Nasal congestion improved.  No watery drainage.  Back on CPAP.  Slow improvement of smell.  Had large sellar mass.  Found on evaluation by Dr. Bell endocrinology at [facility].  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  Postop MRI showed possible residual near cavernous sinus Per Sammy review of postop.  Improvement in visual field cuts.  Following with   [facility].  Follow-up with Dr. Christianson with cerebrovascular, skull-based, and endovascular neurosurgical group at  mid-March.    -2/24/2023 [facility] medical oncology hematology follow-up:White count today virtually identical to September 2022… White count 62,360.  Hemoglobin slightly lower at 10.9 but with normochromic normocytic indices and platelets normal 159,000 with absolute lymphocyte count 54,540 and normal absolute neutrophil count 5160.  No bulky palpable cervical axillary or inguinal adenopathy.  No frequent infections.  No hint of this progressing CLL.  We will repeat his labs on return in 6 months.    -8/29/2023 [facility] Hematology/Oncology clinic follow-up: Mr. Walsh is doing well, he has no new worrisome symptoms, his CBC is stable as shown above.  WBC slightly higher at 75,870, hemoglobin is good at 12.6 with hematocrit 37.6%, platelets 122,000, absolute lymphocytes 67.42.  He has had no doubling of his counts.  He has no adenopathy, no frequent infections, no unusual fatigue.  We will continue monitoring CBC, I will check it again in 3 months since his platelets dropped somewhat but they are still well above 100,000, will also check on return in 6 months.    -2/29/2024 [facility] hematology oncology follow-up: He did not have CBC done since last we saw him.White count is 83,620 with hemoglobin 11.6 fairly stable over time.  Platelets slightly lower at 111,000.  Absolute lymphocyte count 74,820 up from 54,000 a year ago hence lymphocyte count doubling time is not 6 months or less and he still has no bulky adenopathy on exam or hemoglobin less than 10 hemolytic in nature or platelet count less than 100,000 not due to ITP.  No unexplained fatigue or frequent infections or unexplained B symptoms will repeat CBC in 6 months with no current indication for treatment.    -8/30/2024 [facility] hematology clinic follow-up: Sigifredo overall is doing well.  CBC reviewed from yesterday, WBC continues to climb, currently WBC  "97.14 with absolute lymphocytes 91.31 compared to WBC 6 months ago of 83.62 with absolute lymphocytes 74.82, no significant anemia, hemoglobin currently 12.1 with hematocrit 37.7%, platelet count acceptable to 125,000, ANC 5.83.  He has no \"B symptoms\".  He has no lymphadenopathy, no abnormal weight loss, no frequent illnesses or infections.  No unusual fatigue.  We will continue monitoring however I will check his CBC again in 3 months for a little closer surveillance.    -9/19/2024 white count 84,750 hemoglobin 12.1 platelets 112,000.  Absolute lymphocyte count 91,310.  -11/14/2024 white count 88,700 hemoglobin 12.3 platelets 131,000.  Absolute lymphocyte count 78,940 with 8% atypical lymphocytes.  CMP unremarkable.C-reactive protein 0.38.  Sedimentation rate 3.  Uric acid 7.3 upper limit of normal 7.  HLA-B27 negative.     CLL (chronic lymphocytic leukemia)   3/19/2021 Initial Diagnosis    CLL (chronic lymphocytic leukemia) (CMS/[facility])     4/9/2021 Cancer Staged    Staging form: Chronic Lymphocytic Leukemia / Small Lymphocytic Lymphoma, AJCC 8th Edition  - Clinical: Modified Mina Stage 0 (Modified Mina risk: Low, Lymphocytosis: Present, Adenopathy: Absent, Organomegaly: Absent, Anemia: Absent, Thrombocytopenia: Absent) - Signed by Levi Saunders MD on 4/9/2021         HISTORY OF PRESENT ILLNESS:  The patient is a 64 y.o. male, here for follow up on management of CLL.  No new complaints since last we saw him.    Past Medical History:   Diagnosis Date    Allergic 07/01/2023    Seasonal due to prior nasal surgery changing lining of nose    Basal cell carcinoma     Cancer CLL    Cataract     Bilateral    CLL (chronic lymphocytic leukemia)     CTS (carpal tunnel syndrome)     Diabetes     Diverticulosis     Essential hypertension 03/2021    3/21 started Lisinopril    GERD (gastroesophageal reflux disease)     Inflammatory bowel disease     Lymphocytosis 03/08/2021    3/2021. WBC 39.56. Abs lymphocyte 38. Plt 138. Hgb 14.1. " Referral to hematology. CLL?    Nonrheumatic mitral valve regurgitation 03/03/2021    Systolic murmur 4/6 noted 3/2021. TTE EF 56 - 60%. Mild to moderate mitral valve regurgitation, eccentric-anteriorly directed    Osteoarthritis of knee     Pituitary macroadenoma     Pneumonia     Prediabetes 03/08/2021    3/2021 A1c 6.49    Presbycusis of both ears 04/09/2021    Pseudopolyp of sigmoid colon without complication 04/09/2021    Right retinal detachment     2/2022    Seasonal allergic rhinitis due to pollen 02/22/2024    Sensorineural hearing loss (SNHL) of both ears 03/17/2021    3/2021 ENT. Also tinnitus. Recommended hearing aids    Sleep apnea     On CPAP consistently    Suprasellar mass 01/09/2023    Suprasellar mass on MRI 1/5/2023 - possible macroadenoma. Compression of optic nerve bilat    Tubular adenoma of colon 03/03/2021 4/2021: Tubular adenoma of sigmoid. No high grade.    Type 2 diabetes mellitus without complication, without long-term current use of insulin 11/11/2022 11/2022 A1c 6.7    Visual impairment 03/01/2023    tumor was pressing  optic nerve ,detached retina    Vitreous detachment of left eye     2/15/2022.     Past Surgical History:   Procedure Laterality Date    BASAL CELL CARCINOMA EXCISION      CHEST    CATARACT EXTRACTION      december 2022    COLONOSCOPY      4/8/21    CYST REMOVAL      EPIDERMAL CYST EXTRACTION FROM BACK    FINGER GANGLION CYST EXCISION Right     3rd finger in the 90s    INGUINAL HERNIA REPAIR Right     MOHS SURGERY      EXCISION FOR BASAL CELL CARCINOMA ON EAR    OTHER SURGICAL HISTORY      REMOVAL OF PITUITARY TUMOR WITH GAMMA KNIFE    RETINAL DETACHMENT SURGERY      right eye  on 3/18/22    SINUS SURGERY  01/01/2023    went after pitutary tumor through nose       No Known Allergies    Family History and Social History reviewed and changed as necessary    REVIEW OF SYSTEM:   Denies any unexplained weight loss or frequent infections or early satiety or  "pruritus    PHYSICAL EXAM:  No jaundice icterus or pallor.  No respiratory distress.  No cervical axillary inguinal adenopathy.  No palpable hepatosplenomegaly.  No visible rash.    Vitals:    11/22/24 1012   Height: 180.3 cm (71\")     There were no vitals filed for this visit.       ECOG score: 0           Vitals reviewed.    ECOG: (0) Fully Active - Able to Carry On All Pre-disease Performance Without Restriction    Lab Results   Component Value Date    HGB 12.3 (L) 11/14/2024    HCT 36.7 (L) 11/14/2024    MCV 95.6 11/14/2024     (L) 11/14/2024    WBC 88.70 (C) 11/14/2024    NEUTROABS 5.32 11/14/2024    LYMPHSABS 74.82 (H) 02/28/2024    MONOSABS 4.43 (H) 02/28/2024    EOSABS 0.00 08/29/2024    BASOSABS 0.00 08/29/2024       Lab Results   Component Value Date    GLUCOSE 106 (H) 11/14/2024    BUN 18 11/14/2024    CREATININE 1.10 11/14/2024     11/14/2024    K 4.9 11/14/2024     11/14/2024    CO2 27.0 11/14/2024    CALCIUM 9.6 11/14/2024    PROTEINTOT 6.6 11/14/2024    ALBUMIN 4.1 11/14/2024    BILITOT 0.3 11/14/2024    ALKPHOS 55 11/14/2024    AST 13 11/14/2024    ALT 18 11/14/2024             ASSESSMENT & PLAN:  1.  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  Tinnitus  3.  Fungal nail infection  4.  Osteoarthritis.  Rheumatoid factor initially weakly positive negative on repeat.  Seeing Dr. Corbin Gutierrez.  5.  Sleep apnea on CPAP  6.  History of right inguinal herniorrhaphy  7.  Diverticulosis  8.  Hypertension  9.  Status post transsphenoidal hypophysectomy in for nonfunctional pituitary macroadenoma with lateral field cuts improved postop    Hematology history timeline:  -3/8/2021 white count 39,560 with hemoglobin 14.1, platelets 138,000, 81% lymphocytes.  Peripheral smear shows smudge cells with atypical lymphocytes with enlarged nuclei with inconspicuous nucleoli.  An adequate volume for flow " cytometry on initial blood draw.  C-reactive protein less than 0.3.  PSA normal 3.69 glucose 119 otherwise unremarkable CMP.      -3/19/2021 [facility] hematology oncology initial consultation: We will get peripheral blood flow cytometry flow cytometric hematolymphoid neoplasia assessment including ZAP70/CD38, cytogenetics, B-cell rearrangement, immunoglobulin variable heavy chain and p53 mutational analysis sequencing will be done, as well as snip MicroArray for CLL.  Absolute neutrophil count is normal at 4350 with absolute lymphocyte count 32,040.  Without frequent infections I will not check quantitative immunoglobulins.  CT scans are not necessary for diagnosis, surveillance, routine monitoring of treatment response, or progression.  CT scans may be warranted if symptoms of bulky disease or the assessment of risk for tumor lysis syndrome prior to initiation of venetoclax might be considered.  We will check lactate dehydrogenase, beta-2 microglobulin and uric acid.  Lymphocytosis itself is not an indication for treatment.  NCCN guideline indications for treatment include:  Fatigue severe  Night sweats  Unexplained weight loss  Fever without infection  Threatened endorgan function due to bulk  Spleen greater than 6 cm below costal margin  Lymph nodes greater than 10 cm  Progressive nonhemolytic anemia hemoglobin less than 10 (hemolytic anemia treated with steroids)  Progressive nonimmune mediated destruction thrombocytopenia platelets less than 100,000 (ITP)  Steroid refractory cytopenias    If these thresholds are reached, the molecular mutational analysis I am doing will guide the specific therapy I recommend.  Presently he does not have any of those clinical thresholds.  He does have swollen MCP and PIP joints with brothers who have psoriatic arthritis and I will check his sedimentation rate, C-reactive protein, rheumatoid factor, and extractable nuclear antigens as well and ultimately may need rheumatological  referral.  Rheumatologic diseases can cause some lymphocytosis including monoclonal lymphocytic process but this would be higher lymphocyte count than I would expect from such processes.    -3/19/2021 data:  White count 36,900, hemoglobin 14.2, platelets 154,000, absolute neutrophil count 7900, absolute lymphocyte count 26,500.  CMP glucose 112, bicarb 32, otherwise unremarkable with creatinine 1.19 and normal liver enzymes.  Magnesium 2.1  Hemolytic panel: MARIS negative.  .  Uric acid normal 6.8.  ELLIE negative.  Rheumatoid factor negative.  proBNP normal 6.1  Sedimentation rate less than 1 with C-reactive protein 0.38.  Beta-2 microglobulin 3.5 upper limit 2.2  Flow cytometry: Clonal CD5 positive B-cell population 70% of analyzed cells consistent with CLL/SLL  ZAP70 positive/CD 38+: Double positive considered unfavorable and tends to correlate with immunoglobulin variable heavy chain unmutated status.  Immunoglobulin variable heavy chain somatic hyper mutation analysis did not yield interpretable results.  Cancer cytogenetic analysis showed 47, XY, +12 in all 20 metaphases cells.  CLL FISH panel positive for trisomy 12 which is detected in 20% of B-cell CLL which is associated with an intermediate-poor prognosis  B-cell immunoglobulin heavy and kappa light chain gene rearrangements detected corroborating of B-cell neoplasm.  T p53 mutation analysis negative by DNA sequencing.    -4/9/2021 [facility] medical oncology follow-up visit: I reviewed the above molecular data with the patient.  The ZAP70 and CD38 and trisomy 12 positivity put him at an intermediate to high risk of progression I will watch him fairly closely.  I will repeat his CBC and CMP and physical exam in 2 months.  Thresholds for therapy as outlined above.  Ultimately what we choose to treat at that point depends on the COVID-19 pandemic status as much as his disease markers but he has not p53 mutated so we have a broader range of  options.    -6/10/2021 [facility] hematology follow-up visit: 6/4/2021 data reveals white count 43,850 with hemoglobin 12.8 platelets 147,000.  Absolute neutrophil count normal 4820.  No bulky adenopathy.  We will repeat labs again in 2 months.  Indications/thresholds for therapy for CLL as indicated above have not been reached.    -9/10/2021 [facility] medical oncology follow-up visit: White count today is 52,700.  Hemoglobin is 13.9 with platelets 196,000 and no bulky adenopathy, fevers, chills, unexplained weight loss, effusions, bed drenching night sweats.  Hence has not reached NCCN guideline indications for treatment as outlined above.  We will see him back in 3 months with monthly CBC in the interim.  He has not had doubling of his lymphocyte count in less than 6 months either.  Recommended Covid vaccination with booster.    -12/20/2021 [facility] Oncology clinic follow-up:  Continues to do well on surveillance, white count stable currently at 59,100, absolute lymphocytes 51.42, no anemia or thrombocytopenia.  He has no lymphadenopathy or other symptoms suggestive of disease progression.  We will continue with monthly CBC.  We will see him back in 3 months for follow-up.  If counts remain stable at that time we may be able to extend the time frame of checking his CBC out a little bit.  Indications for treatment per NCCN guidelines are listed above note dated 3/19/2021.    -3/21/2022 [facility] medical oncology follow-up visit: Clinically he is doing well with no new somatic complaints.  His white count is actually lower over the last 3 months now 53,930 with a hemoglobin of 12.4 and platelets 166,000 with absolute neutrophil count quite adequate at 2700 and no frequent infections.  We will check his CBC in 3 months and again just prior to return in 6 months given stability of counts over time with no significant rise in white count and no triggers for need of treatment.    -9/30/2022 [facility] Oncology/Hematology clinic  follow-up: Mr. Walsh continues to do well, still no indication for treatment.  CBC is stable with WBC 62,590, mild anemia with hemoglobin of 12.5, hematocrit 37.2%, MCV normal at 96.6, platelet count normal at 149,000.  He has no new worrisome symptoms.  No lymphadenopathy.  We will continue with observation, we will repeat CBC every 3 months and I have ordered that today.  We will see him back in 6 months for follow-up.    - 2/21/2023 follow-up Dr.Saini MORRIS ENT.  He had transsphenoidal hypophysis ectomy with right nasoseptal flap 1/18/2023.  Nasal congestion improved.  No watery drainage.  Back on CPAP.  Slow improvement of smell.  Had large sellar mass.  Found on evaluation by Dr. Bell endocrinology at [facility].  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  Postop MRI showed possible residual near cavernous sinus Per Sammy review of postop.  Improvement in visual field cuts.  Following with Dr. Bell.  Follow-up with Dr. Christianson with cerebrovascular, skull-based, and endovascular neurosurgical group at  mid-March.    -2/24/2023 [facility] medical oncology hematology follow-up:White count today virtually identical to September 2022… White count 62,360.  Hemoglobin slightly lower at 10.9 but with normochromic normocytic indices and platelets normal 159,000 with absolute lymphocyte count 54,540 and normal absolute neutrophil count 5160.  No bulky palpable cervical axillary or inguinal adenopathy.  No frequent infections.  No hint of this progressing CLL.  We will repeat his labs on return in 6 months.    -8/29/2023 [facility] Hematology/Oncology clinic follow-up: Mr. Walsh is doing well, he has no new worrisome symptoms, his CBC is stable as shown above.  WBC slightly higher at 75,870, hemoglobin is good at 12.6 with hematocrit 37.6%, platelets 122,000, absolute lymphocytes 67.42.  He has had no doubling of his counts.  He has no adenopathy,  "no frequent infections, no unusual fatigue.  We will continue monitoring CBC, I will check it again in 3 months since his platelets dropped somewhat but they are still well above 100,000, will also check on return in 6 months.    -2/29/2024 [facility] hematology oncology follow-up: He did not have CBC done since last we saw him.White count is 83,620 with hemoglobin 11.6 fairly stable over time.  Platelets slightly lower at 111,000.  Absolute lymphocyte count 74,820 up from 54,000 a year ago hence lymphocyte count doubling time is not 6 months or less and he still has no bulky adenopathy on exam or hemoglobin less than 10 hemolytic in nature or platelet count less than 100,000 not due to ITP.  No unexplained fatigue or frequent infections or unexplained B symptoms will repeat CBC in 6 months with no current indication for treatment.    -8/30/2024 [facility] hematology clinic follow-up: Sigifredo overall is doing well.  CBC reviewed from yesterday, WBC continues to climb, currently WBC 97.14 with absolute lymphocytes 91.31 compared to WBC 6 months ago of 83.62 with absolute lymphocytes 74.82, no significant anemia, hemoglobin currently 12.1 with hematocrit 37.7%, platelet count acceptable to 125,000, ANC 5.83.  He has no \"B symptoms\".  He has no lymphadenopathy, no abnormal weight loss, no frequent illnesses or infections.  No unusual fatigue.  We will continue monitoring however I will check his CBC again in 3 months for a little closer surveillance.    -9/19/2024 white count 84,750 hemoglobin 12.1 platelets 112,000.  Absolute lymphocyte count 91,310.  -11/14/2024 white count 88,700 hemoglobin 12.3 platelets 131,000.  Absolute lymphocyte count 78,940 with 8% atypical lymphocytes.  CMP unremarkable.C-reactive protein 0.38.  Sedimentation rate 3.  Uric acid 7.3 upper limit of normal 7.  HLA-B27 negative.    -11/22/2024 [facility] hematology oncology clinic follow-up: He has no signs or symptoms to suggest progression of his CLL and " does not meet criteria for treatment as outlined above on the NCCN guideline.  Will repeat CBC prior to return in 6 months.  He is up-to-date with his vaccinations.    Total time of care today inclusive of time spent today prior to patient's arrival reviewing interval data and during visit translating that patient putting forth plan as outlined in after visit instituting this plan took 23 minutes patient care time throughout the day today.  Levi Saunders MD    11/22/2024

## 2024-11-22 NOTE — LETTER
November 22, 2024     Jasper Zhao MD  [address]  [facility] [id number]    Patient: Sigifredo Walsh   YOB: 1960   Date of Visit: 11/22/2024     Dear Jasper Zhao MD:       Thank you for referring Sigifredo Walsh to me for evaluation. Below are the relevant portions of my assessment and plan of care.    If you have questions, please do not hesitate to call me. I look forward to following Sigifredo along with you.         Sincerely,        Levi Saunders MD        CC: MD Corbin Leal MD Hicks, Lee G, MD  11/22/24 1025  Sign when Signing Visit  CHIEF COMPLAINT: No new somatic complaints today with no bed drenching night sweats and no unexplained weight loss.    Problem List:  Oncology/Hematology History Overview Note   1.  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  Tinnitus  3.  Fungal nail infection  4.  Osteoarthritis.  Rheumatoid factor initially weakly positive negative on repeat.  Seeing Dr. Corbin Gutierrez.  5.  Sleep apnea on CPAP  6.  History of right inguinal herniorrhaphy  7.  Diverticulosis  8.  Hypertension  9.  Status post transsphenoidal hypophysectomy in for nonfunctional pituitary macroadenoma with lateral field cuts improved postop    Hematology history timeline:  -3/8/2021 white count 39,560 with hemoglobin 14.1, platelets 138,000, 81% lymphocytes.  Peripheral smear shows smudge cells with atypical lymphocytes with enlarged nuclei with inconspicuous nucleoli.  An adequate volume for flow cytometry on initial blood draw.  C-reactive protein less than 0.3.  PSA normal 3.69 glucose 119 otherwise unremarkable CMP.      -3/19/2021 [facility] hematology oncology initial consultation: We will get peripheral blood flow cytometry flow cytometric hematolymphoid neoplasia assessment including ZAP70/CD38, cytogenetics, B-cell rearrangement, immunoglobulin variable heavy  chain and p53 mutational analysis sequencing will be done, as well as snip MicroArray for CLL.  Absolute neutrophil count is normal at 4350 with absolute lymphocyte count 32,040.  Without frequent infections I will not check quantitative immunoglobulins.  CT scans are not necessary for diagnosis, surveillance, routine monitoring of treatment response, or progression.  CT scans may be warranted if symptoms of bulky disease or the assessment of risk for tumor lysis syndrome prior to initiation of venetoclax might be considered.  We will check lactate dehydrogenase, beta-2 microglobulin and uric acid.  Lymphocytosis itself is not an indication for treatment.  NCCN guideline indications for treatment include:  Fatigue severe  Night sweats  Unexplained weight loss  Fever without infection  Threatened endorgan function due to bulk  Spleen greater than 6 cm below costal margin  Lymph nodes greater than 10 cm  Progressive nonhemolytic anemia hemoglobin less than 10 (hemolytic anemia treated with steroids)  Progressive nonimmune mediated destruction thrombocytopenia platelets less than 100,000 (ITP)  Steroid refractory cytopenias    If these thresholds are reached, the molecular mutational analysis I am doing will guide the specific therapy I recommend.  Presently he does not have any of those clinical thresholds.  He does have swollen MCP and PIP joints with brothers who have psoriatic arthritis and I will check his sedimentation rate, C-reactive protein, rheumatoid factor, and extractable nuclear antigens as well and ultimately may need rheumatological referral.  Rheumatologic diseases can cause some lymphocytosis including monoclonal lymphocytic process but this would be higher lymphocyte count than I would expect from such processes.    -3/19/2021 data:  White count 36,900, hemoglobin 14.2, platelets 154,000, absolute neutrophil count 7900, absolute lymphocyte count 26,500.  CMP glucose 112, bicarb 32, otherwise  unremarkable with creatinine 1.19 and normal liver enzymes.  Magnesium 2.1  Hemolytic panel: MARIS negative.  .  Uric acid normal 6.8.  ELLIE negative.  Rheumatoid factor negative.  proBNP normal 6.1  Sedimentation rate less than 1 with C-reactive protein 0.38.  Beta-2 microglobulin 3.5 upper limit 2.2  Flow cytometry: Clonal CD5 positive B-cell population 70% of analyzed cells consistent with CLL/SLL  ZAP70 positive/CD 38+: Double positive considered unfavorable and tends to correlate with immunoglobulin variable heavy chain unmutated status.  Immunoglobulin variable heavy chain somatic hyper mutation analysis did not yield interpretable results.  Cancer cytogenetic analysis showed 47, XY, +12 in all 20 metaphases cells.  CLL FISH panel positive for trisomy 12 which is detected in 20% of B-cell CLL which is associated with an intermediate-poor prognosis  B-cell immunoglobulin heavy and kappa light chain gene rearrangements detected corroborating of B-cell neoplasm.  T p53 mutation analysis negative by DNA sequencing.    -4/9/2021 [facility] oncology follow-up visit: I reviewed the above molecular data with the patient.  The ZAP70 and CD38 and trisomy 12 positivity put him at an intermediate to high risk of progression I will watch him fairly closely.  I will repeat his CBC and CMP and physical exam in 2 months.  Thresholds for therapy as outlined above.  Ultimately what we choose to treat at that point depends on the COVID-19 pandemic status as much as his disease markers but he has not p53 mutated so we have a broader range of options.    -6/10/2021 [facility] hematology follow-up visit: 6/4/2021 data reveals white count 43,850 with hemoglobin 12.8 platelets 147,000.  Absolute neutrophil count normal 4820.  No bulky adenopathy.  We will repeat labs again in 2 months.  Indications/thresholds for therapy for CLL as indicated above have not been reached.    -9/10/2021 [facility] medical oncology follow-up visit:  White count today is 52,700.  Hemoglobin is 13.9 with platelets 196,000 and no bulky adenopathy, fevers, chills, unexplained weight loss, effusions, bed drenching night sweats.  Hence has not reached NCCN guideline indications for treatment as outlined above.  We will see him back in 3 months with monthly CBC in the interim.  He has not had doubling of his lymphocyte count in less than 6 months either.  Recommended Covid vaccination with booster.    -12/20/2021 [facility] Oncology clinic follow-up:  Continues to do well on surveillance, white count stable currently at 59,100, absolute lymphocytes 51.42, no anemia or thrombocytopenia.  He has no lymphadenopathy or other symptoms suggestive of disease progression.  We will continue with monthly CBC.  We will see him back in 3 months for follow-up.  If counts remain stable at that time we may be able to extend the time frame of checking his CBC out a little bit.  Indications for treatment per NCCN guidelines are listed above note dated 3/19/2021.    -3/21/2022 [facility] medical oncology follow-up visit: Clinically he is doing well with no new somatic complaints.  His white count is actually lower over the last 3 months now 53,930 with a hemoglobin of 12.4 and platelets 166,000 with absolute neutrophil count quite adequate at 2700 and no frequent infections.  We will check his CBC in 3 months and again just prior to return in 6 months given stability of counts over time with no significant rise in white count and no triggers for need of treatment.    -9/30/2022 [facility] Oncology/Hematology clinic follow-up: Mr. Walsh continues to do well, still no indication for treatment.  CBC is stable with WBC 62,590, mild anemia with hemoglobin of 12.5, hematocrit 37.2%, MCV normal at 96.6, platelet count normal at 149,000.  He has no new worrisome symptoms.  No lymphadenopathy.  We will continue with observation, we will repeat CBC every 3 months and I have ordered that today.  We  will see him back in 6 months for follow-up.    - 2/21/2023 follow-up Dr.Saini MORRIS ENT.  He had transsphenoidal hypophysis ectomy with right nasoseptal flap 1/18/2023.  Nasal congestion improved.  No watery drainage.  Back on CPAP.  Slow improvement of smell.  Had large sellar mass.  Found on evaluation by Dr. Bell endocrinology at [facility], [place].  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  Postop MRI showed possible residual near cavernous sinus Per Sammy review of postop.  Improvement in visual field cuts.  Following with Dr. Bell.  Follow-up with Dr. Christianson with cerebrovascular, skull-based, and endovascular neurosurgical group at  mid-March.    -2/24/2023 [facility] [place] medical oncology hematology follow-up:White count today virtually identical to September 2022… White count 62,360.  Hemoglobin slightly lower at 10.9 but with normochromic normocytic indices and platelets normal 159,000 with absolute lymphocyte count 54,540 and normal absolute neutrophil count 5160.  No bulky palpable cervical axillary or inguinal adenopathy.  No frequent infections.  No hint of this progressing CLL.  We will repeat his labs on return in 6 months.    -8/29/2023 [facility] [place] Hematology/Oncology clinic follow-up: Mr. Walsh is doing well, he has no new worrisome symptoms, his CBC is stable as shown above.  WBC slightly higher at 75,870, hemoglobin is good at 12.6 with hematocrit 37.6%, platelets 122,000, absolute lymphocytes 67.42.  He has had no doubling of his counts.  He has no adenopathy, no frequent infections, no unusual fatigue.  We will continue monitoring CBC, I will check it again in 3 months since his platelets dropped somewhat but they are still well above 100,000, will also check on return in 6 months.    -2/29/2024 [facility] [place] hematology oncology follow-up: He did not have CBC done since last we saw him.White count is 83,620 with hemoglobin 11.6 fairly stable  "over time.  Platelets slightly lower at 111,000.  Absolute lymphocyte count 74,820 up from 54,000 a year ago hence lymphocyte count doubling time is not 6 months or less and he still has no bulky adenopathy on exam or hemoglobin less than 10 hemolytic in nature or platelet count less than 100,000 not due to ITP.  No unexplained fatigue or frequent infections or unexplained B symptoms will repeat CBC in 6 months with no current indication for treatment.    -8/30/2024 [facility] hematology clinic follow-up: Sigifredo overall is doing well.  CBC reviewed from yesterday, WBC continues to climb, currently WBC 97.14 with absolute lymphocytes 91.31 compared to WBC 6 months ago of 83.62 with absolute lymphocytes 74.82, no significant anemia, hemoglobin currently 12.1 with hematocrit 37.7%, platelet count acceptable to 125,000, ANC 5.83.  He has no \"B symptoms\".  He has no lymphadenopathy, no abnormal weight loss, no frequent illnesses or infections.  No unusual fatigue.  We will continue monitoring however I will check his CBC again in 3 months for a little closer surveillance.    -9/19/2024 white count 84,750 hemoglobin 12.1 platelets 112,000.  Absolute lymphocyte count 91,310.  -11/14/2024 white count 88,700 hemoglobin 12.3 platelets 131,000.  Absolute lymphocyte count 78,940 with 8% atypical lymphocytes.  CMP unremarkable.C-reactive protein 0.38.  Sedimentation rate 3.  Uric acid 7.3 upper limit of normal 7.  HLA-B27 negative.     CLL (chronic lymphocytic leukemia)   3/19/2021 Initial Diagnosis    CLL (chronic lymphocytic leukemia) (CMS/[facility])     4/9/2021 Cancer Staged    Staging form: Chronic Lymphocytic Leukemia / Small Lymphocytic Lymphoma, AJCC 8th Edition  - Clinical: Modified Mina Stage 0 (Modified Mina risk: Low, Lymphocytosis: Present, Adenopathy: Absent, Organomegaly: Absent, Anemia: Absent, Thrombocytopenia: Absent) - Signed by Levi Saunders MD on 4/9/2021         HISTORY OF PRESENT ILLNESS:  The patient is a 64 y.o. male, " here for follow up on management of CLL.  No new complaints since last we saw him.    Past Medical History:   Diagnosis Date   • Allergic 07/01/2023    Seasonal due to prior nasal surgery changing lining of nose   • Basal cell carcinoma    • Cancer CLL   • Cataract     Bilateral   • CLL (chronic lymphocytic leukemia)    • CTS (carpal tunnel syndrome)    • Diabetes    • Diverticulosis    • Essential hypertension 03/2021    3/21 started Lisinopril   • GERD (gastroesophageal reflux disease)    • Inflammatory bowel disease    • Lymphocytosis 03/08/2021    3/2021. WBC 39.56. Abs lymphocyte 38. Plt 138. Hgb 14.1. Referral to hematology. CLL?   • Nonrheumatic mitral valve regurgitation 03/03/2021    Systolic murmur 4/6 noted 3/2021. TTE EF 56 - 60%. Mild to moderate mitral valve regurgitation, eccentric-anteriorly directed   • Osteoarthritis of knee    • Pituitary macroadenoma    • Pneumonia    • Prediabetes 03/08/2021    3/2021 A1c 6.49   • Presbycusis of both ears 04/09/2021   • Pseudopolyp of sigmoid colon without complication 04/09/2021   • Right retinal detachment     2/2022   • Seasonal allergic rhinitis due to pollen 02/22/2024   • Sensorineural hearing loss (SNHL) of both ears 03/17/2021    3/2021 ENT. Also tinnitus. Recommended hearing aids   • Sleep apnea     On CPAP consistently   • Suprasellar mass 01/09/2023    Suprasellar mass on MRI 1/5/2023 - possible macroadenoma. Compression of optic nerve bilat   • Tubular adenoma of colon 03/03/2021 4/2021: Tubular adenoma of sigmoid. No high grade.   • Type 2 diabetes mellitus without complication, without long-term current use of insulin 11/11/2022 11/2022 A1c 6.7   • Visual impairment 03/01/2023    tumor was pressing  optic nerve ,detached retina   • Vitreous detachment of left eye     2/15/2022.     Past Surgical History:   Procedure Laterality Date   • BASAL CELL CARCINOMA EXCISION      CHEST   • CATARACT EXTRACTION      december 2022   • COLONOSCOPY       "4/8/21   • CYST REMOVAL      EPIDERMAL CYST EXTRACTION FROM BACK   • FINGER GANGLION CYST EXCISION Right     3rd finger in the 90s   • INGUINAL HERNIA REPAIR Right    • MOHS SURGERY      EXCISION FOR BASAL CELL CARCINOMA ON EAR   • OTHER SURGICAL HISTORY      REMOVAL OF PITUITARY TUMOR WITH GAMMA KNIFE   • RETINAL DETACHMENT SURGERY      right eye  on 3/18/22   • SINUS SURGERY  01/01/2023    went after pitutary tumor through nose       No Known Allergies    Family History and Social History reviewed and changed as necessary    REVIEW OF SYSTEM:   Denies any unexplained weight loss or frequent infections or early satiety or pruritus    PHYSICAL EXAM:  No jaundice icterus or pallor.  No respiratory distress.  No cervical axillary inguinal adenopathy.  No palpable hepatosplenomegaly.  No visible rash.    Vitals:    11/22/24 1012   Height: 180.3 cm (71\")     There were no vitals filed for this visit.       ECOG score: 0           Vitals reviewed.    ECOG: (0) Fully Active - Able to Carry On All Pre-disease Performance Without Restriction    Lab Results   Component Value Date    HGB 12.3 (L) 11/14/2024    HCT 36.7 (L) 11/14/2024    MCV 95.6 11/14/2024     (L) 11/14/2024    WBC 88.70 (C) 11/14/2024    NEUTROABS 5.32 11/14/2024    LYMPHSABS 74.82 (H) 02/28/2024    MONOSABS 4.43 (H) 02/28/2024    EOSABS 0.00 08/29/2024    BASOSABS 0.00 08/29/2024       Lab Results   Component Value Date    GLUCOSE 106 (H) 11/14/2024    BUN 18 11/14/2024    CREATININE 1.10 11/14/2024     11/14/2024    K 4.9 11/14/2024     11/14/2024    CO2 27.0 11/14/2024    CALCIUM 9.6 11/14/2024    PROTEINTOT 6.6 11/14/2024    ALBUMIN 4.1 11/14/2024    BILITOT 0.3 11/14/2024    ALKPHOS 55 11/14/2024    AST 13 11/14/2024    ALT 18 11/14/2024             ASSESSMENT & PLAN:  1.  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  Tinnitus  3.  Fungal nail " infection  4.  Osteoarthritis.  Rheumatoid factor initially weakly positive negative on repeat.  Seeing Dr. Corbin Gutierrez.  5.  Sleep apnea on CPAP  6.  History of right inguinal herniorrhaphy  7.  Diverticulosis  8.  Hypertension  9.  Status post transsphenoidal hypophysectomy in for nonfunctional pituitary macroadenoma with lateral field cuts improved postop    Hematology history timeline:  -3/8/2021 white count 39,560 with hemoglobin 14.1, platelets 138,000, 81% lymphocytes.  Peripheral smear shows smudge cells with atypical lymphocytes with enlarged nuclei with inconspicuous nucleoli.  An adequate volume for flow cytometry on initial blood draw.  C-reactive protein less than 0.3.  PSA normal 3.69 glucose 119 otherwise unremarkable CMP.      -3/19/2021 [facility] hematology oncology initial consultation: We will get peripheral blood flow cytometry flow cytometric hematolymphoid neoplasia assessment including ZAP70/CD38, cytogenetics, B-cell rearrangement, immunoglobulin variable heavy chain and p53 mutational analysis sequencing will be done, as well as snip MicroArray for CLL.  Absolute neutrophil count is normal at 4350 with absolute lymphocyte count 32,040.  Without frequent infections I will not check quantitative immunoglobulins.  CT scans are not necessary for diagnosis, surveillance, routine monitoring of treatment response, or progression.  CT scans may be warranted if symptoms of bulky disease or the assessment of risk for tumor lysis syndrome prior to initiation of venetoclax might be considered.  We will check lactate dehydrogenase, beta-2 microglobulin and uric acid.  Lymphocytosis itself is not an indication for treatment.  NCCN guideline indications for treatment include:  Fatigue severe  Night sweats  Unexplained weight loss  Fever without infection  Threatened endorgan function due to bulk  Spleen greater than 6 cm below costal margin  Lymph nodes greater than 10 cm  Progressive nonhemolytic anemia  hemoglobin less than 10 (hemolytic anemia treated with steroids)  Progressive nonimmune mediated destruction thrombocytopenia platelets less than 100,000 (ITP)  Steroid refractory cytopenias    If these thresholds are reached, the molecular mutational analysis I am doing will guide the specific therapy I recommend.  Presently he does not have any of those clinical thresholds.  He does have swollen MCP and PIP joints with brothers who have psoriatic arthritis and I will check his sedimentation rate, C-reactive protein, rheumatoid factor, and extractable nuclear antigens as well and ultimately may need rheumatological referral.  Rheumatologic diseases can cause some lymphocytosis including monoclonal lymphocytic process but this would be higher lymphocyte count than I would expect from such processes.    -3/19/2021 data:  White count 36,900, hemoglobin 14.2, platelets 154,000, absolute neutrophil count 7900, absolute lymphocyte count 26,500.  CMP glucose 112, bicarb 32, otherwise unremarkable with creatinine 1.19 and normal liver enzymes.  Magnesium 2.1  Hemolytic panel: MARIS negative.  .  Uric acid normal 6.8.  ELLIE negative.  Rheumatoid factor negative.  proBNP normal 6.1  Sedimentation rate less than 1 with C-reactive protein 0.38.  Beta-2 microglobulin 3.5 upper limit 2.2  Flow cytometry: Clonal CD5 positive B-cell population 70% of analyzed cells consistent with CLL/SLL  ZAP70 positive/CD 38+: Double positive considered unfavorable and tends to correlate with immunoglobulin variable heavy chain unmutated status.  Immunoglobulin variable heavy chain somatic hyper mutation analysis did not yield interpretable results.  Cancer cytogenetic analysis showed 47, XY, +12 in all 20 metaphases cells.  CLL FISH panel positive for trisomy 12 which is detected in 20% of B-cell CLL which is associated with an intermediate-poor prognosis  B-cell immunoglobulin heavy and kappa light chain gene rearrangements detected  corroborating of B-cell neoplasm.  T p53 mutation analysis negative by DNA sequencing.    -4/9/2021 [facility] medical oncology follow-up visit: I reviewed the above molecular data with the patient.  The ZAP70 and CD38 and trisomy 12 positivity put him at an intermediate to high risk of progression I will watch him fairly closely.  I will repeat his CBC and CMP and physical exam in 2 months.  Thresholds for therapy as outlined above.  Ultimately what we choose to treat at that point depends on the COVID-19 pandemic status as much as his disease markers but he has not p53 mutated so we have a broader range of options.    -6/10/2021 [facility] hematology follow-up visit: 6/4/2021 data reveals white count 43,850 with hemoglobin 12.8 platelets 147,000.  Absolute neutrophil count normal 4820.  No bulky adenopathy.  We will repeat labs again in 2 months.  Indications/thresholds for therapy for CLL as indicated above have not been reached.    -9/10/2021 [facility] medical oncology follow-up visit: White count today is 52,700.  Hemoglobin is 13.9 with platelets 196,000 and no bulky adenopathy, fevers, chills, unexplained weight loss, effusions, bed drenching night sweats.  Hence has not reached NCCN guideline indications for treatment as outlined above.  We will see him back in 3 months with monthly CBC in the interim.  He has not had doubling of his lymphocyte count in less than 6 months either.  Recommended Covid vaccination with booster.    -12/20/2021 [facility] Oncology clinic follow-up:  Continues to do well on surveillance, white count stable currently at 59,100, absolute lymphocytes 51.42, no anemia or thrombocytopenia.  He has no lymphadenopathy or other symptoms suggestive of disease progression.  We will continue with monthly CBC.  We will see him back in 3 months for follow-up.  If counts remain stable at that time we may be able to extend the time frame of checking his CBC out a little bit.  Indications for treatment per  NCCN guidelines are listed above note dated 3/19/2021.    -3/21/2022 [facility] medical oncology follow-up visit: Clinically he is doing well with no new somatic complaints.  His white count is actually lower over the last 3 months now 53,930 with a hemoglobin of 12.4 and platelets 166,000 with absolute neutrophil count quite adequate at 2700 and no frequent infections.  We will check his CBC in 3 months and again just prior to return in 6 months given stability of counts over time with no significant rise in white count and no triggers for need of treatment.    -9/30/2022 [facility] Oncology/Hematology clinic follow-up: Mr. Walsh continues to do well, still no indication for treatment.  CBC is stable with WBC 62,590, mild anemia with hemoglobin of 12.5, hematocrit 37.2%, MCV normal at 96.6, platelet count normal at 149,000.  He has no new worrisome symptoms.  No lymphadenopathy.  We will continue with observation, we will repeat CBC every 3 months and I have ordered that today.  We will see him back in 6 months for follow-up.    - 2/21/2023 follow-up Dr.Saini MORRIS ENT.  He had transsphenoidal hypophysis ectomy with right nasoseptal flap 1/18/2023.  Nasal congestion improved.  No watery drainage.  Back on CPAP.  Slow improvement of smell.  Had large sellar mass.  Found on evaluation by Dr. Bell endocrinology at [facility].  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  Postop MRI showed possible residual near cavernous sinus Per Sammy review of postop.  Improvement in visual field cuts.  Following with Dr. Bell.  Follow-up with Dr. Christianson with cerebrovascular, skull-based, and endovascular neurosurgical group at  mid-March.    -2/24/2023 [facility] medical oncology hematology follow-up:White count today virtually identical to September 2022… White count 62,360.  Hemoglobin slightly lower at 10.9 but with normochromic normocytic indices and platelets  "normal 159,000 with absolute lymphocyte count 54,540 and normal absolute neutrophil count 5160.  No bulky palpable cervical axillary or inguinal adenopathy.  No frequent infections.  No hint of this progressing CLL.  We will repeat his labs on return in 6 months.    -8/29/2023 [facility] Hematology/Oncology clinic follow-up: Mr. Walsh is doing well, he has no new worrisome symptoms, his CBC is stable as shown above.  WBC slightly higher at 75,870, hemoglobin is good at 12.6 with hematocrit 37.6%, platelets 122,000, absolute lymphocytes 67.42.  He has had no doubling of his counts.  He has no adenopathy, no frequent infections, no unusual fatigue.  We will continue monitoring CBC, I will check it again in 3 months since his platelets dropped somewhat but they are still well above 100,000, will also check on return in 6 months.    -2/29/2024 [facility] hematology oncology follow-up: He did not have CBC done since last we saw him.White count is 83,620 with hemoglobin 11.6 fairly stable over time.  Platelets slightly lower at 111,000.  Absolute lymphocyte count 74,820 up from 54,000 a year ago hence lymphocyte count doubling time is not 6 months or less and he still has no bulky adenopathy on exam or hemoglobin less than 10 hemolytic in nature or platelet count less than 100,000 not due to ITP.  No unexplained fatigue or frequent infections or unexplained B symptoms will repeat CBC in 6 months with no current indication for treatment.    -8/30/2024 [facility] hematology clinic follow-up: Sigifredo overall is doing well.  CBC reviewed from yesterday, WBC continues to climb, currently WBC 97.14 with absolute lymphocytes 91.31 compared to WBC 6 months ago of 83.62 with absolute lymphocytes 74.82, no significant anemia, hemoglobin currently 12.1 with hematocrit 37.7%, platelet count acceptable to 125,000, ANC 5.83.  He has no \"B symptoms\".  He has no lymphadenopathy, no abnormal weight loss, no frequent illnesses or infections.  No " unusual fatigue.  We will continue monitoring however I will check his CBC again in 3 months for a little closer surveillance.    -9/19/2024 white count 84,750 hemoglobin 12.1 platelets 112,000.  Absolute lymphocyte count 91,310.  -11/14/2024 white count 88,700 hemoglobin 12.3 platelets 131,000.  Absolute lymphocyte count 78,940 with 8% atypical lymphocytes.  CMP unremarkable.C-reactive protein 0.38.  Sedimentation rate 3.  Uric acid 7.3 upper limit of normal 7.  HLA-B27 negative.    -11/22/2024 [facility] hematology oncology clinic follow-up: He has no signs or symptoms to suggest progression of his CLL and does not meet criteria for treatment as outlined above on the NCCN guideline.  Will repeat CBC prior to return in 6 months.  He is up-to-date with his vaccinations.    Total time of care today inclusive of time spent today prior to patient's arrival reviewing interval data and during visit translating that patient putting forth plan as outlined in after visit instituting this plan took 23 minutes patient care time throughout the day today.  Levi Saunders MD    11/22/2024

## 2024-12-06 RX ORDER — CHLORTHALIDONE 25 MG/1
25 TABLET ORAL DAILY
Qty: 30 TABLET | Refills: 0 | Status: SHIPPED | OUTPATIENT
Start: 2024-12-06

## 2024-12-06 NOTE — TELEPHONE ENCOUNTER
Rx Refill Note  Requested Prescriptions     Pending Prescriptions Disp Refills    chlorthalidone (HYGROTON) 25 MG tablet [Pharmacy Med Name: Chlorthalidone 25 MG Oral Tablet] 30 tablet 0     Sig: Take 1 tablet by mouth once daily      Last office visit with prescribing clinician: 9/20/2024   Last telemedicine visit with prescribing clinician: Visit date not found   Next office visit with prescribing clinician: 12/19/2024                         Would you like a call back once the refill request has been completed: [] Yes [] No    If the office needs to give you a call back, can they leave a voicemail: [] Yes [] No    Luann Pratt MA  12/06/24, 15:16 EST

## 2024-12-11 ENCOUNTER — OFFICE VISIT (OUTPATIENT)
Dept: ORTHOPEDIC SURGERY | Facility: CLINIC | Age: 64
End: 2024-12-11
Payer: COMMERCIAL

## 2024-12-11 VITALS
HEIGHT: 71 IN | SYSTOLIC BLOOD PRESSURE: 110 MMHG | BODY MASS INDEX: 32.06 KG/M2 | DIASTOLIC BLOOD PRESSURE: 68 MMHG | WEIGHT: 229 LBS

## 2024-12-11 DIAGNOSIS — M79.674 TOE PAIN, RIGHT: Primary | ICD-10-CM

## 2024-12-11 NOTE — PROGRESS NOTES
[facility] [place] Orthopaedic Surgery Office Visit     Office Visit       Date: 12/11/2024   Patient Name: Sigifredo Walsh  MRN: [id number]  YOB: 1960    Referring Physician: Corbin Gutierrez MD     Chief Complaint:   Chief Complaint   Patient presents with    Right Foot - Pain       History of Present Illness: Sigifredo Walsh is a 64 y.o. male who is here today for chief complaint of right great toe pain and swelling.  States since making this appointment and is seeing me today the pain and swelling have improved.  Reports previously seen by podiatry for ingrown toenail that had subsequently became infected.  States was released by podiatry and some of the pain and swelling continued but has now subsided.  History of diabetes.  Patient reports she does not think he has neuropathy.  History of CLL.  Sees rheumatology and was referred here by rheumatology.  Reports she does not have rheumatoid arthritis to his knowledge.  Does state he has osteoarthritis in multiple joints.    Subjective   Review of Systems: Review of Systems   Constitutional: Negative.    HENT: Negative.     Eyes: Negative.    Respiratory: Negative.     Cardiovascular: Negative.    Gastrointestinal: Negative.    Endocrine: Negative.    Genitourinary: Negative.    Musculoskeletal:  Positive for arthralgias.   Skin: Negative.    Allergic/Immunologic: Negative.    Neurological: Negative.    Hematological: Negative.    Psychiatric/Behavioral: Negative.          Past Medical History:   Past Medical History:   Diagnosis Date    Allergic 07/01/2023    Seasonal due to prior nasal surgery changing lining of nose    Basal cell carcinoma     Cancer CLL    Cataract     Bilateral    CLL (chronic lymphocytic leukemia)     CTS (carpal tunnel syndrome)     Diabetes     Diverticulosis     Essential hypertension 03/2021    3/21 started Lisinopril    GERD (gastroesophageal reflux disease)     Inflammatory bowel disease      Lymphocytosis 03/08/2021    3/2021. WBC 39.56. Abs lymphocyte 38. Plt 138. Hgb 14.1. Referral to hematology. CLL?    Nonrheumatic mitral valve regurgitation 03/03/2021    Systolic murmur 4/6 noted 3/2021. TTE EF 56 - 60%. Mild to moderate mitral valve regurgitation, eccentric-anteriorly directed    Osteoarthritis of knee     Pituitary macroadenoma     Pneumonia     Prediabetes 03/08/2021    3/2021 A1c 6.49    Presbycusis of both ears 04/09/2021    Pseudopolyp of sigmoid colon without complication 04/09/2021    Right retinal detachment     2/2022    Seasonal allergic rhinitis due to pollen 02/22/2024    Sensorineural hearing loss (SNHL) of both ears 03/17/2021    3/2021 ENT. Also tinnitus. Recommended hearing aids    Sleep apnea     On CPAP consistently    Suprasellar mass 01/09/2023    Suprasellar mass on MRI 1/5/2023 - possible macroadenoma. Compression of optic nerve bilat    Tubular adenoma of colon 03/03/2021 4/2021: Tubular adenoma of sigmoid. No high grade.    Type 2 diabetes mellitus without complication, without long-term current use of insulin 11/11/2022 11/2022 A1c 6.7    Visual impairment 03/01/2023    tumor was pressing  optic nerve ,detached retina    Vitreous detachment of left eye     2/15/2022.       Past Surgical History:   Past Surgical History:   Procedure Laterality Date    BASAL CELL CARCINOMA EXCISION      CHEST    CATARACT EXTRACTION      december 2022    COLONOSCOPY      4/8/21    CYST REMOVAL      EPIDERMAL CYST EXTRACTION FROM BACK    FINGER GANGLION CYST EXCISION Right     3rd finger in the 90s    FOOT SURGERY  july 2024    in grown toe nail procedure    HAND SURGERY  1995    growth removal    INGUINAL HERNIA REPAIR Right     MOHS SURGERY      EXCISION FOR BASAL CELL CARCINOMA ON EAR    OTHER SURGICAL HISTORY      REMOVAL OF PITUITARY TUMOR WITH GAMMA KNIFE    RETINAL DETACHMENT SURGERY      right eye  on 3/18/22    SINUS SURGERY  01/01/2023    went after pitutary tumor through nose        Family History:   Family History   Problem Relation Age of Onset    Arthritis Mother     Obesity Mother     Hypertension Mother     Kidney disease Mother     Diabetes Mother     Arthritis Brother     Migraines Brother     Arthritis Maternal Grandmother     Diabetes Maternal Grandmother     Obesity Maternal Grandmother     Stroke Maternal Grandmother     Hypertension Paternal Grandmother        Social History:   Social History     Socioeconomic History    Marital status:    Tobacco Use    Smoking status: Never     Passive exposure: Never    Smokeless tobacco: Never   Vaping Use    Vaping status: Never Used   Substance and Sexual Activity    Alcohol use: Yes     Alcohol/week: 1.0 standard drink of alcohol     Types: 1 Cans of beer per week     Comment: socially    Drug use: Never    Sexual activity: Not Currently     Partners: Female     Comment: Wife Had Hysterectomy       Medications:   Current Outpatient Medications:     cetirizine (zyrTEC) 10 MG tablet, Take 1 tablet by mouth As Needed for Allergies., Disp: , Rfl:     chlorthalidone (HYGROTON) 25 MG tablet, Take 1 tablet by mouth once daily, Disp: 30 tablet, Rfl: 0    Diclofenac Sodium (VOLTAREN) 1 % gel gel, Apply 4 g topically to the appropriate area as directed 4 (Four) Times a Day As Needed., Disp: , Rfl:     GLUCOSAMINE-CHONDROITIN PO, Take 2 tablets by mouth Daily., Disp: , Rfl:     lisinopril (PRINIVIL,ZESTRIL) 10 MG tablet, Take 1 tablet by mouth once daily, Disp: 30 tablet, Rfl: 5    meloxicam (MOBIC) 15 MG tablet, Take 1 tablet by mouth Every Other Day., Disp: 90 tablet, Rfl: 1    metFORMIN (GLUCOPHAGE) 500 MG tablet, Take 1 tablet by mouth 2 (Two) Times a Day With Meals., Disp: 60 tablet, Rfl: 5    multivitamin with minerals tablet tablet, Take 1 tablet by mouth Daily. Ran out, Disp: , Rfl:     Testosterone (AndroGel Pump) 20.25 MG/ACT (1.62%) gel, Apply 40.5 mg (2 pumps) to dry, intact skin of the shoulders or upper arms, Disp: 75 g, Rfl:  "5    Triamcinolone Acetonide (NASACORT ALLERGY 24HR NA), 1 spray into the nostril(s) as directed by provider Daily., Disp: , Rfl:     Allergies: No Known Allergies    I reviewed the patient's chief complaint, history of present illness, review of systems, past medical history, surgical history, family history, social history, medications and allergy list.     Objective    Vital Signs:   Vitals:    12/11/24 0812   BP: 110/68   Weight: 104 kg (229 lb)   Height: 180.3 cm (70.98\")     Body mass index is 31.95 kg/m².    Ortho Exam:  right LE Foot and Ankle Exam:   Plantigrade foot.   There is good perfusion to the toes.   The skin is intact throughout the foot and ankle.  Range of motion of ankle, foot and toes is within normal limits.   There is no tenderness on exam about the great toe.  No appreciable swelling or erythema.  No open wounds or drainage.  10/10 sites felt on monofilament testing of foot.     Results Review:   XR FOOT 3+ VW BILATERAL     Date of Exam: 10/22/2024 3:17 PM EDT     Indication: pain     Comparison: None available.     Findings:  3 views of each foot. There are hammertoe deformities of the third through fifth toes bilaterally. There is moderate narrowing of the TMT joints. There is mild spurring from the dorsum of several tarsals, greatest on the left. There is no calcaneal   spurring. There are no acute or old fractures.     IMPRESSION:  Impression:  Degenerative changes as detailed.        Electronically Signed: Bronwyn Barajas MD    10/24/2024 9:30 AM EDT    Workstation ID: [id number]    12/11/24 I have personally reviewed and interpreted the images from outside facility with the documented findings, no acute osseous abnormality right great toe    Assessment / Plan    Assessment/Plan:   Diagnoses and all orders for this visit:    1. Toe pain, right (Primary)      Discussed with right great toe pain and swelling (undiagnosed new problem with uncertain prognosis) with patient as well as " treatment options at length. Decision regarding surgical intervention considered.  Patient with history of ingrown toenail previously treated by podiatry.  Reports there was persistent pain and swelling about the toe concerning for persistent infection.  On exam today there are no obvious signs of infection.  I did talk at length with both patient and patient's spouse that I do not see any radiographic signs of osteomyelitis however an MRI would be a more sensitive imaging study to help make such a diagnosis.  I do think that if he had osteomyelitis he would likely have a different physical exam.  We discussed that observational management is a reasonable way of moving forward and that if his symptoms were to return I would recommend he follow-up and at that point would likely recommend an MRI.  Patient expressed agreement understanding with plan.  Was a pleasure seeing him today.    Follow Up:   Return if symptoms worsen or fail to improve.      Sameer Cho MD  [facility] Orthopedic Surgeon

## 2024-12-19 ENCOUNTER — OFFICE VISIT (OUTPATIENT)
Dept: INTERNAL MEDICINE | Facility: CLINIC | Age: 64
End: 2024-12-19
Payer: COMMERCIAL

## 2024-12-19 VITALS
TEMPERATURE: 97.3 F | HEART RATE: 60 BPM | DIASTOLIC BLOOD PRESSURE: 72 MMHG | HEIGHT: 71 IN | WEIGHT: 229.4 LBS | BODY MASS INDEX: 32.11 KG/M2 | SYSTOLIC BLOOD PRESSURE: 104 MMHG

## 2024-12-19 DIAGNOSIS — D35.2 PITUITARY MICROADENOMA: ICD-10-CM

## 2024-12-19 DIAGNOSIS — E11.40 TYPE 2 DIABETES MELLITUS WITH DIABETIC NEUROPATHY, WITHOUT LONG-TERM CURRENT USE OF INSULIN: Primary | Chronic | ICD-10-CM

## 2024-12-19 DIAGNOSIS — C91.10 CLL (CHRONIC LYMPHOCYTIC LEUKEMIA): Chronic | ICD-10-CM

## 2024-12-19 DIAGNOSIS — L98.492 SKIN ULCER WITH FAT LAYER EXPOSED: ICD-10-CM

## 2024-12-19 DIAGNOSIS — I10 ESSENTIAL HYPERTENSION: Chronic | ICD-10-CM

## 2024-12-19 DIAGNOSIS — R76.8 RHEUMATOID FACTOR POSITIVE: ICD-10-CM

## 2024-12-19 DIAGNOSIS — M15.9 GENERALIZED OSTEOARTHROSIS, INVOLVING MULTIPLE SITES: Chronic | ICD-10-CM

## 2024-12-19 PROCEDURE — 99214 OFFICE O/P EST MOD 30 MIN: CPT | Performed by: STUDENT IN AN ORGANIZED HEALTH CARE EDUCATION/TRAINING PROGRAM

## 2024-12-19 NOTE — PROGRESS NOTES
"Chief Complaint  Sigifredo Walsh is a 64 y.o. male presenting for Diabetes.     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 Saunders, [facility]-on[facility]) and mitral valve regurgitation.     No nasal swabs, tubes or testing placed in patient's nose.  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    History of Present Illness  Patient is here for follow-up.    Patient did see the vascular surgeon for his right leg lesion, they did not recommend any intervention.  Ultrasound Doppler did not show any DVT, but some reflux.  He feels the lesion is slightly improving, continues to apply ointment.  See image below.    Otherwise he uses compression stockings for the leg swelling.    He continues to follow-up with Dr. Bell after his hypophysectomy, and he is doing fairly well.  He also saw a rheumatology, and they did not have concern for rheumatoid arthritis, lupus or psoriatic arthritis, concluded with osteoarthritis.    The following portions of the patient's history were reviewed and updated as appropriate: allergies, current medications, past family history, past medical history, past social history, past surgical history, and problem list.    Image captured per patient verbal consent:          Objective  /72 (BP Location: Left arm, Patient Position: Sitting, Cuff Size: Large Adult)   Pulse 60   Temp 97.3 °F (36.3 °C) (Temporal)   Ht 180.3 cm (70.98\")   Wt 104 kg (229 lb 6.4 oz)   BMI 32.01 kg/m²     Physical Exam  Vitals reviewed.   Constitutional:       Appearance: Normal appearance.   HENT:      Head: Normocephalic and atraumatic.      Nose: Nose normal. No congestion.      " Mouth/Throat:      Mouth: Mucous membranes are moist.   Eyes:      Extraocular Movements: Extraocular movements intact.      Conjunctiva/sclera: Conjunctivae normal.   Cardiovascular:      Rate and Rhythm: Normal rate and regular rhythm.      Heart sounds: Murmur heard.   Pulmonary:      Effort: Pulmonary effort is normal. No respiratory distress.      Breath sounds: Normal breath sounds. No wheezing.   Musculoskeletal:      Cervical back: Neck supple.      Right lower leg: Edema present.      Left lower leg: Edema present.   Skin:     General: Skin is warm and dry.      Findings: Lesion present.      Comments: Lesion over right posterior leg, see image   Neurological:      Mental Status: He is alert and oriented to person, place, and time. Mental status is at baseline.   Psychiatric:         Behavior: Behavior normal.         Thought Content: Thought content normal.         Assessment/Plan   1. Type 2 diabetes mellitus with diabetic neuropathy, without long-term current use of insulin  Hemoglobin A1C   Date Value Ref Range Status   09/20/2024 6.8 (A) 4.5 - 5.7 % Final   05/20/2024 6.5 (A) 4.5 - 5.7 % Final   02/22/2024 6.6 (A) 4.5 - 5.7 % Final   05/24/2023 6.60 (H) 4.80 - 5.60 % Final   03/27/2023 6.0 (H) <5.7 % Final   01/19/2023 6.4 (H) <5.7 % Final   02/11/2022 6.30 (H) 4.80 - 5.60 % Final   08/11/2021 5.94 (H) 4.80 - 5.60 % Final   Overall good glycemic control.  Continue on metformin and follow-up with endocrinology.    2. Essential hypertension  BP Readings from Last 3 Encounters:   12/19/24 104/72   12/11/24 110/68   11/22/24 111/67   Blood pressure well-controlled.  Continue on current medications and follow-up with me with annual physical in May, sooner if needed    3. Generalized osteoarthrosis, involving multiple sites  4. Rheumatoid factor positive  Evaluated rheumatology, continue on Mobic, but would avoid daily use for kidneys.    5. Skin ulcer with fat layer exposed  Continue topical treatment.  If  this does not heal up within the next few months consider referral to dermatology/biopsy.    6. CLL (chronic lymphocytic leukemia)  Stable.  Continue follow-up with oncology    7. Pituitary microadenoma  Doing well after surgery, continue follow-up with  endocrinology      Return in about 5 months (around 5/20/2025), or if symptoms worsen or fail to improve, for Annual physical.    Future Appointments         Provider Department Center    2/4/2025 8:30 AM (Arrive by 8:15 AM) Ayde Bell MD [facility]CRINOLOGY [facility]    4/22/2025 8:45 AM Corbin Gutierrez MD [facility] RHEUMATOLOGY [facility]    5/21/2025 8:30 AM Jasper Zhao MD [facility] INTERNAL MEDICINE [facility]    5/23/2025 11:00 AM (Arrive by 10:45 AM) Huma Somers APRN [facility] HEMATOLOGY & ONCOLOGY [facility]    9/16/2025 9:30 AM (Arrive by 9:15 AM) Ayde Bell MD [facility]CRINOLOGY [facility]              Jasper Zhao MD  Family Medicine  12/19/2024

## 2025-01-07 RX ORDER — CHLORTHALIDONE 25 MG/1
25 TABLET ORAL DAILY
Qty: 30 TABLET | Refills: 5 | Status: SHIPPED | OUTPATIENT
Start: 2025-01-07

## 2025-02-04 ENCOUNTER — PRIOR AUTHORIZATION (OUTPATIENT)
Dept: ENDOCRINOLOGY | Facility: CLINIC | Age: 65
End: 2025-02-04

## 2025-02-04 ENCOUNTER — OFFICE VISIT (OUTPATIENT)
Dept: ENDOCRINOLOGY | Facility: CLINIC | Age: 65
End: 2025-02-04
Payer: COMMERCIAL

## 2025-02-04 VITALS
WEIGHT: 231 LBS | HEIGHT: 71 IN | DIASTOLIC BLOOD PRESSURE: 72 MMHG | SYSTOLIC BLOOD PRESSURE: 108 MMHG | HEART RATE: 64 BPM | BODY MASS INDEX: 32.34 KG/M2 | OXYGEN SATURATION: 95 %

## 2025-02-04 DIAGNOSIS — D35.2 PITUITARY MACROADENOMA WITH EXTRASELLAR EXTENSION: Primary | ICD-10-CM

## 2025-02-04 DIAGNOSIS — E23.0 HYPOGONADOTROPIC HYPOGONADISM: ICD-10-CM

## 2025-02-04 PROCEDURE — 99214 OFFICE O/P EST MOD 30 MIN: CPT | Performed by: INTERNAL MEDICINE

## 2025-02-04 RX ORDER — TESTOSTERONE 1.62 MG/G
GEL TRANSDERMAL
Qty: 75 G | Refills: 5 | Status: SHIPPED | OUTPATIENT
Start: 2025-02-04

## 2025-02-04 NOTE — PROGRESS NOTES
"Chief Complaint   Patient presents with    Pituitary macroadenoma with extrasellar extension    Hypogonadism     Follow-up       HPI:   Sigifredo Walsh is a 64 y.o.male who presents to endocrine clinic for follow-up evaluation of his pituitary macroadenoma s/p resection and XR and recent finding of hypogonadotrophic hypogonadism.  Last visit 09/16/2024. His history is as follows:    Pt is accompanied by his wife today.    Interim Events:  - pt was unable to  androgel Rx sent in 10/2024. Unclear why not filled. Pharmacy did not contact office for PA.   - Reports continued fatigue but has not started the testosterone gel. Reports sleeping longer. Is using his CPAP consistently  - Pt with right lower leg edema. Is wearing compression stockings.   - denies increased thirst. Urinating overnight intermittently but at his baseline before surgery. Denies increased urination during the day.   - Denies HA's     1) pituitary macroadenoma with extrasellar extension:  - Patient developed decreased vision in early 2022 after left eye vitreous detachment. He vision did not improve over time which was thought to be due to a cataract. After undergoing left eye cataract surgery in December 2022, he continued to have decreased vision.  This was further evaluated by his ophthalmologist with an MRI of the brain and orbits with and without contrast on 01/5/2023 at [facility].  The report described a 2.8 cm (AP) x 3.5 cm (mediolateral) x 3.3 cm (CC) suprasellar mass with \" marked impingement upon the optic chiasm and nerves, asymmetrically worse on the left.\"   -Patient was evaluated by his PCP on 01/11/2023 and the following labs were collected:  (01/11/2023 at 10:36 AM)   LH 5.23, FSH 5.77  TSH 0.771, no free T4 collected  Prolactin 41.20 - Stalk effect  10:30AM cortisol 8.29, no ACTH collected    - (01/17/2023) Initial Endocrine Visit:  Lab evaluation (collected 12:40pm) - no medications initiated  ACTH 59.8 (7.2 " "- 63.3), Cortisol 5.24, IGF-1 77 (64 - 240), Free T4 0.79 (0.93 - 1.70)    - (01/18/2023) s/p endoscopic transsphenoidal resection of his pituitary tumor by Dr. Donnie Christianson at [facility]. No complications post-operatively.  Post-op labs:  (01/18/2023) Surgical Pathology: \"pituitary adenoma\"  (01/19/2023) POD#1 - Na 139  (01/20/2023) POD#2 - Na 137, 5AM cortisol 29.2    - (03/03/2023) - CMP, urine Osm, AM ACTH, AM Cortisol, prolactin, FSH, LH, TSH, free T4 levels showed no deficits  -  (04/10/2023) s/p gamma knife radiosurgery at [facility]     Post treatment Lab Summary:   (05/24/2023) - Na 142, AM ACTH 72.3, AM Cortisol 9.60,  TSH 2.130, free T4 1.12 -  no deficits  (09/19/2023) - AM ACTH 54.3, AM Cortisol 7.47,  TSH 1.630, free T4 1.07, LH 4.25, FSH 3.65    Recent Imaging:  (09/06/2024, [facility]) MRI \"Postsurgical changes from transsphenoidal resection. There is redemonstration of an enlarged sella. Enhancing soft tissue in the posterior sella with a component extending anteriorly along the right side of the sella, not significantly changed from prior. This tissue is suspected to represent residual tumor. There is again extension of the enhancing soft tissue into the right cavernous sinus. The posterior most aspect of the enhancing tissue measures 23 x 7 x 10 mm. The component of the lesion within the right cavernous sinus may be mildly decreased in size. The infundibulum is again deviated to the right side. Mild downward displacement of the optic chiasm is compatible with postsurgical change. Otherwise no new intracranial enhancement.\"    (08/09/2023) Eye Max MOW: Visual Field Testing was normal.      (09/19/2024) Labs collected at 8:24 AM  (09/19/2024) -  Na 141, AM ACTH 61.9, AM Cortisol 7.84,  TSH 2.790, free T4 (by equil dialysis) 0.97, LH 5.13, FSH 4.48  Total testosterone (LC/MS, [facility]) 67.5 (264.0 - 916.0), free testosterone 1.60 (5 - 21), free Test % 2.37 (1.50 - 4.20)    2) hypogonadotrophic hypogonadism:  - pt wit " h/o pituitary macroadenoma s/p TSR and GKRS in 2023  - AM labs collected twice: show low testosterone  (09/19/2024 at 8:24 AM): LH 5.13, FSH 4.48, Total testosterone (LC/MS, [facility]) 67.5 (264.0 - 916.0), free testosterone by equilibrium dialysis 1.60 (5 - 21), free Test % 2.37 (1.50 - 4.20).  - (11/01/2024 at 8:29 AM): SHBG normal at 24.4, Total testosterone (LC/MS, [facility]) 70.5 (264.0 - 916.0), free testosterone by equilibrium dialysis 2.13 (5 - 21), free Test % 3.02 (1.50 - 4.20)    Sent Rx for Androgel in 10/2024. Pharmacy did not fill and PA was not sent to our office.     Other Medical History:   - Has Type 2 DM and is currently on metformin 500 mg BID  - Has CLL and is followed by heme/onc at   - Has KARRIE and is on CPAP  - Has chronic hearing loss and tinnitus. Wears hearing aids  - Has osteoarthritis  - Is taking chlorthalidone as needed for mild lower extremity edema    Review of Systems   Constitutional:  Positive for fatigue.        Weight stable   HENT:  Positive for hearing loss and tinnitus.    Eyes:  Negative for visual disturbance (peripheral vision has improved).   Respiratory: Negative.     Cardiovascular:  Positive for leg swelling (right lower leg).   Gastrointestinal: Negative.    Endocrine: Negative.  Negative for polydipsia and polyuria.   Genitourinary: Negative.  Negative for frequency.   Musculoskeletal:  Positive for arthralgias, back pain and myalgias.   Skin: Negative.    Allergic/Immunologic: Negative.    Neurological: Negative.  Negative for dizziness, weakness, light-headedness and headaches.   Hematological: Negative.    Psychiatric/Behavioral: Negative.       The following portions of the patient's history were reviewed and updated as appropriate: allergies, current medications, past family history, past medical history, past social history, past surgical history and problem list.    /72 (BP Location: Right arm, Patient Position: Sitting, Cuff Size: Adult)   Pulse 64    "Ht 180.3 cm (70.98\")   Wt 105 kg (231 lb)   SpO2 95%   BMI 32.23 kg/m²   Physical Exam  Vitals reviewed.   Constitutional:       General: He is not in acute distress.     Appearance: He is well-developed. He is not diaphoretic.   HENT:      Head: Normocephalic.      Mouth/Throat:      Mouth: Mucous membranes are moist.      Pharynx: Oropharynx is clear.   Eyes:      Conjunctiva/sclera: Conjunctivae normal.      Pupils: Pupils are equal, round, and reactive to light.   Neck:      Thyroid: No thyromegaly.      Trachea: No tracheal deviation.      Comments: No palpable thyroid nodules    Cardiovascular:      Rate and Rhythm: Normal rate and regular rhythm.      Heart sounds: Murmur (chronic, MV regurgitation) heard.   Pulmonary:      Effort: Pulmonary effort is normal. No respiratory distress.      Breath sounds: Normal breath sounds.   Abdominal:      General: Bowel sounds are normal.      Palpations: Abdomen is soft. There is no mass.      Tenderness: There is no abdominal tenderness.   Musculoskeletal:      Cervical back: Neck supple.      Right lower leg: Edema (mild) present.      Left lower leg: No edema.   Lymphadenopathy:      Cervical: No cervical adenopathy.   Skin:     General: Skin is warm and dry.      Findings: No erythema.   Neurological:      Mental Status: He is alert and oriented to person, place, and time.      Comments: No visual deficits in left upper and lower quadrants on VFT.   Psychiatric:         Behavior: Behavior normal.         LABS/IMAGING: outside records reviewed and summarized in HPI  Lab ordered for follow-up visit on 09/16/2025.     ASSESSMENT/PLAN:  1) pituitary macroadenoma with extrasellar extension s/p TSR on 01/18/2023, s/p GKRS on 04/10/2023:   - Pt is clinically doing well post-operatively.  His peripheral vision has improved  - Reports increased fatigue over the past few months.   - Reviewed risks for pituitary deficiencies after gamma knife   - AM labs ordered for " 09/16/2025: ACTH, Cortisol, CBC, CMP, TSH, free T4 by equilibrium dialysis  - No clinical evidence of diabetes insipidus. Advised pt that it is okay to take chlorthalidone or other diuretic at this time  - Will continue to monitor pituitary function tests as indiacted  - Last VFT was normal (08/2023)  - Has f/u imaging with [facility] in two years (2026)    - AM Labs ordered for follow-up visit on 09/16/2025.     2) hypogonadotrophic hypogonadism:  - pt wit h/o pituitary macroadenoma s/p TSR and GKRS in 2023  - labs collected in AM in 09/2024 and 11/2024 showed low testosterone with normal SHBG.   - pt with h/o KARRIE and uses CPAP consistently  - pt with CLL followed by Oncology  - Rx from 10/2024 not filled by pharmacy. Have resent Rx again for topical Androgel 40.5 mg daily  - Will check testosterone level on medication at his follow-up     RTC 6 months    Electronically Signed: Ayde Bell MD

## 2025-02-04 NOTE — TELEPHONE ENCOUNTER
Sigifredo Walsh (Key: [id number])  PA Case ID #: [id number]  Rx #: [id number]  Need Help? Call us at [phone]  Outcome  Approved today by [employer]  Request Reference Number: [id number]. TESTOSTERONE GEL 1.62% is approved through 02/04/2026. Your patient may now fill this prescription and it will be covered.  Authorization Expiration Date: 2/4/2026  Drug  Testosterone 1.62% gel  ePA cloud logo  Form  [employer] Electronic Prior Authorization Form (2017 [employer])

## 2025-02-21 DIAGNOSIS — I10 ESSENTIAL HYPERTENSION: ICD-10-CM

## 2025-02-21 RX ORDER — LISINOPRIL 10 MG/1
10 TABLET ORAL DAILY
Qty: 30 TABLET | Refills: 5 | Status: SHIPPED | OUTPATIENT
Start: 2025-02-21

## 2025-02-21 NOTE — TELEPHONE ENCOUNTER
Rx Refill Note  Requested Prescriptions     Pending Prescriptions Disp Refills    lisinopril (PRINIVIL,ZESTRIL) 10 MG tablet 30 tablet 5     Sig: Take 1 tablet by mouth Daily.      Last office visit with prescribing clinician: 12/19/2024   Last telemedicine visit with prescribing clinician: Visit date not found   Next office visit with prescribing clinician: 5/21/2025                         Would you like a call back once the refill request has been completed: [] Yes [] No    If the office needs to give you a call back, can they leave a voicemail: [] Yes [] No    Luann Pratt MA  02/21/25, 14:42 EST

## 2025-03-08 DIAGNOSIS — E11.9 TYPE 2 DIABETES MELLITUS WITHOUT COMPLICATION, WITHOUT LONG-TERM CURRENT USE OF INSULIN: Chronic | ICD-10-CM

## 2025-04-09 DIAGNOSIS — E11.9 TYPE 2 DIABETES MELLITUS WITHOUT COMPLICATION, WITHOUT LONG-TERM CURRENT USE OF INSULIN: Chronic | ICD-10-CM

## 2025-04-09 NOTE — TELEPHONE ENCOUNTER
Rx Refill Note  Requested Prescriptions     Pending Prescriptions Disp Refills    metFORMIN (GLUCOPHAGE) 500 MG tablet [Pharmacy Med Name: metFORMIN HCl 500 MG Oral Tablet] 60 tablet 0     Sig: TAKE 1 TABLET BY MOUTH TWICE DAILY WITH MEALS      Last office visit with prescribing clinician: 12/19/2024   Last telemedicine visit with prescribing clinician: Visit date not found   Next office visit with prescribing clinician: 5/21/2025                         Would you like a call back once the refill request has been completed: [] Yes [] No    If the office needs to give you a call back, can they leave a voicemail: [] Yes [] No    Lin Sanderson LPN  04/09/25, 13:28 EDT

## 2025-04-22 ENCOUNTER — LAB (OUTPATIENT)
Facility: HOSPITAL | Age: 65
End: 2025-04-22
Payer: COMMERCIAL

## 2025-04-22 ENCOUNTER — TELEPHONE (OUTPATIENT)
Age: 65
End: 2025-04-22

## 2025-04-22 ENCOUNTER — RESULTS FOLLOW-UP (OUTPATIENT)
Age: 65
End: 2025-04-22

## 2025-04-22 ENCOUNTER — OFFICE VISIT (OUTPATIENT)
Age: 65
End: 2025-04-22
Payer: COMMERCIAL

## 2025-04-22 VITALS
DIASTOLIC BLOOD PRESSURE: 74 MMHG | TEMPERATURE: 98 F | HEART RATE: 90 BPM | WEIGHT: 227 LBS | BODY MASS INDEX: 31.78 KG/M2 | SYSTOLIC BLOOD PRESSURE: 138 MMHG | HEIGHT: 71 IN

## 2025-04-22 DIAGNOSIS — R53.83 OTHER FATIGUE: ICD-10-CM

## 2025-04-22 DIAGNOSIS — R76.8 RHEUMATOID FACTOR POSITIVE: ICD-10-CM

## 2025-04-22 DIAGNOSIS — C91.10 CLL (CHRONIC LYMPHOCYTIC LEUKEMIA): ICD-10-CM

## 2025-04-22 DIAGNOSIS — M54.32 SCIATICA OF LEFT SIDE: ICD-10-CM

## 2025-04-22 DIAGNOSIS — E23.0 HYPOGONADOTROPIC HYPOGONADISM: ICD-10-CM

## 2025-04-22 DIAGNOSIS — E55.9 VITAMIN D DEFICIENCY: ICD-10-CM

## 2025-04-22 DIAGNOSIS — M15.9 GENERALIZED OSTEOARTHROSIS, INVOLVING MULTIPLE SITES: Primary | ICD-10-CM

## 2025-04-22 DIAGNOSIS — M15.9 GENERALIZED OSTEOARTHROSIS, INVOLVING MULTIPLE SITES: ICD-10-CM

## 2025-04-22 LAB
25(OH)D3 SERPL-MCNC: 39.5 NG/ML (ref 30–100)
ALBUMIN SERPL-MCNC: 4.2 G/DL (ref 3.5–5.2)
ALBUMIN/GLOB SERPL: 2.2 G/DL
ALP SERPL-CCNC: 65 U/L (ref 39–117)
ALT SERPL W P-5'-P-CCNC: 16 U/L (ref 1–41)
ANION GAP SERPL CALCULATED.3IONS-SCNC: 11 MMOL/L (ref 5–15)
AST SERPL-CCNC: 16 U/L (ref 1–40)
BILIRUB SERPL-MCNC: 0.3 MG/DL (ref 0–1.2)
BUN SERPL-MCNC: 13 MG/DL (ref 8–23)
BUN/CREAT SERPL: 8.5 (ref 7–25)
CALCIUM SPEC-SCNC: 9.6 MG/DL (ref 8.6–10.5)
CHLORIDE SERPL-SCNC: 103 MMOL/L (ref 98–107)
CO2 SERPL-SCNC: 26 MMOL/L (ref 22–29)
CREAT SERPL-MCNC: 1.53 MG/DL (ref 0.76–1.27)
CRP SERPL-MCNC: 0.35 MG/DL (ref 0–0.5)
DEPRECATED RDW RBC AUTO: 48.1 FL (ref 37–54)
EGFRCR SERPLBLD CKD-EPI 2021: 49.8 ML/MIN/1.73
ERYTHROCYTE [DISTWIDTH] IN BLOOD BY AUTOMATED COUNT: 13.6 % (ref 12.3–15.4)
ERYTHROCYTE [SEDIMENTATION RATE] IN BLOOD: 4 MM/HR (ref 0–20)
GLOBULIN UR ELPH-MCNC: 1.9 GM/DL
GLUCOSE SERPL-MCNC: 94 MG/DL (ref 65–99)
HCT VFR BLD AUTO: 35.1 % (ref 37.5–51)
HGB BLD-MCNC: 12 G/DL (ref 13–17.7)
LYMPHOCYTES # BLD MANUAL: 94.22 10*3/MM3 (ref 0.7–3.1)
MCH RBC QN AUTO: 32.9 PG (ref 26.6–33)
MCHC RBC AUTO-ENTMCNC: 34.2 G/DL (ref 31.5–35.7)
MCV RBC AUTO: 96.2 FL (ref 79–97)
NEUTROPHILS # BLD AUTO: 9.32 10*3/MM3 (ref 1.7–7)
NEUTROPHILS NFR BLD MANUAL: 9 % (ref 42.7–76)
PLAT MORPH BLD: NORMAL
PLATELET # BLD AUTO: 131 10*3/MM3 (ref 140–450)
PMV BLD AUTO: 9.8 FL (ref 6–12)
POTASSIUM SERPL-SCNC: 4.9 MMOL/L (ref 3.5–5.2)
PROT SERPL-MCNC: 6.1 G/DL (ref 6–8.5)
RBC # BLD AUTO: 3.65 10*6/MM3 (ref 4.14–5.8)
RBC MORPH BLD: NORMAL
SODIUM SERPL-SCNC: 140 MMOL/L (ref 136–145)
TSH SERPL DL<=0.05 MIU/L-ACNC: 2.61 UIU/ML (ref 0.27–4.2)
VARIANT LYMPHS NFR BLD MANUAL: 91 % (ref 19.6–45.3)
VIT B12 BLD-MCNC: 551 PG/ML (ref 211–946)
WBC MORPH BLD: NORMAL
WBC NRBC COR # BLD AUTO: 103.54 10*3/MM3 (ref 3.4–10.8)

## 2025-04-22 PROCEDURE — 99214 OFFICE O/P EST MOD 30 MIN: CPT | Performed by: INTERNAL MEDICINE

## 2025-04-22 PROCEDURE — 85652 RBC SED RATE AUTOMATED: CPT

## 2025-04-22 PROCEDURE — 36415 COLL VENOUS BLD VENIPUNCTURE: CPT

## 2025-04-22 PROCEDURE — 86140 C-REACTIVE PROTEIN: CPT

## 2025-04-22 PROCEDURE — 82306 VITAMIN D 25 HYDROXY: CPT

## 2025-04-22 PROCEDURE — 80053 COMPREHEN METABOLIC PANEL: CPT

## 2025-04-22 PROCEDURE — 82607 VITAMIN B-12: CPT

## 2025-04-22 PROCEDURE — 85007 BL SMEAR W/DIFF WBC COUNT: CPT

## 2025-04-22 PROCEDURE — 84443 ASSAY THYROID STIM HORMONE: CPT

## 2025-04-22 PROCEDURE — 85025 COMPLETE CBC W/AUTO DIFF WBC: CPT

## 2025-04-22 RX ORDER — TESTOSTERONE 1.62 MG/G
GEL TRANSDERMAL
Qty: 75 G | Refills: 5 | Status: SHIPPED | OUTPATIENT
Start: 2025-04-22

## 2025-04-22 RX ORDER — METHOCARBAMOL 500 MG/1
500 TABLET, FILM COATED ORAL 2 TIMES DAILY PRN
Qty: 60 TABLET | Refills: 5 | Status: SHIPPED | OUTPATIENT
Start: 2025-04-22

## 2025-04-22 RX ORDER — MELOXICAM 7.5 MG/1
7.5 TABLET ORAL DAILY PRN
Qty: 30 TABLET | Refills: 5 | Status: SHIPPED | OUTPATIENT
Start: 2025-04-22

## 2025-04-22 RX ORDER — MELOXICAM 15 MG/1
15 TABLET ORAL EVERY OTHER DAY
Qty: 90 TABLET | Refills: 1 | Status: SHIPPED | OUTPATIENT
Start: 2025-04-22 | End: 2025-04-22 | Stop reason: SDUPTHER

## 2025-04-22 NOTE — TELEPHONE ENCOUNTER
Rx Refill Note  Requested Prescriptions     Pending Prescriptions Disp Refills    Testosterone (AndroGel Pump) 20.25 MG/ACT (1.62%) gel 75 g 5     Sig: Apply 40.5 mg (2 pumps) to dry, intact skin of the shoulders or upper arms      Last office visit with prescribing clinician: 2/4/2025   Last telemedicine visit with prescribing clinician: Visit date not found   Next office visit with prescribing clinician: 9/16/2025       Michelle Geiger MA  04/22/25, 10:36 EDT

## 2025-04-22 NOTE — PROGRESS NOTES
Office Follow Up      Date: 04/22/2025   Patient Name: Sigifredo Walsh  MRN: [id number]  YOB: 1960    Referring Physician: No ref. provider found     Chief Complaint:   Chief Complaint   Patient presents with    Follow-up    Generalized osteoarthrosis, involving multiple sites       History of Present Illness: Sigifredo Walsh is a 64 y.o. 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  He has a brother with psoriatic arthritis and wants to get checked out to make sure he does not have psoriatic arthritis.  He has never had psoriasis himself.  He does not notice any swelling to his joints.  No hot red joints.  No history of gout.  His major trouble is pain and stiffness in his right knee and right wrist.  Activity makes his knee pain and stiffness worse.  Rest makes it better.  He uses over-the-counter ibuprofen intermittently for the joint pain with some relief.  No swelling warmth or effusion to the knees.  His hands sometimes go numb particularly when he is driving or doing repetitive motions.  He uses carpal tunnel wrist splints at night.        He follows with [facility] Dr. Saunders for his CLL.  Continues monitoring CLL.  No treatment for this presently.  He follows with [facility] ENT and neurosurgery for his history of pituitary adenoma status post removal  He follows with endocrinology Dr. Bell for diabetes and history of pituitary adenoma     Interim 10/22/2024: He had ingrown toenail removed from the right great toe July 2024.  He was treated with antibiotics by podiatry. It has been swollen and red ever since.  He wonders if this is  dactylitis/sausage toe.  minimal pain in the right great toe except for when he wears compression stockings.  He has lower extremity edema and plans to have vein studies.  No rashes.    4/22/2025: He has had struggles with left sciatica in the interim.  He is paid over thousand dollars for massages.  He is use some of his wife's methocarbamol which helped him.  Continues meloxicam alternating with ibuprofen.       History of Present Illness        Subjective       Review of Systems: Review of Systems   Constitutional:  Positive for fatigue. Negative for chills, fever and unexpected weight loss.   HENT:  Positive for tinnitus. Negative for mouth sores, sinus pressure and sore throat.    Eyes:  Negative for pain and redness.   Respiratory:  Positive for apnea. Negative for cough and shortness of breath.    Cardiovascular:  Positive for leg swelling. Negative for chest pain.   Gastrointestinal:  Negative for abdominal pain, blood in stool, diarrhea, nausea, vomiting and GERD.   Endocrine: Negative for polydipsia and polyuria.   Genitourinary:  Negative for dysuria, genital sores and hematuria.   Musculoskeletal:  Positive for arthralgias and back pain. Negative for joint swelling, myalgias, neck pain and neck stiffness.   Skin:  Positive for bruise. Negative for rash.   Allergic/Immunologic: Positive for environmental allergies.   Neurological:  Negative for seizures, weakness, numbness and memory problem.   Hematological:  Negative for adenopathy. Does not bruise/bleed easily.   Psychiatric/Behavioral:  Negative for depressed mood. The patient is not nervous/anxious.         Past Medical History:   Past Medical History:   Diagnosis Date    Allergic 07/01/2023    Seasonal due to prior nasal surgery changing lining of nose    Basal cell carcinoma     Cancer CLL    Cataract     Bilateral    CLL (chronic lymphocytic leukemia)     CTS (carpal tunnel syndrome)     Diabetes     Diverticulosis     Essential hypertension  03/2021    3/21 started Lisinopril    GERD (gastroesophageal reflux disease)     Inflammatory bowel disease     Lymphocytosis 03/08/2021    3/2021. WBC 39.56. Abs lymphocyte 38. Plt 138. Hgb 14.1. Referral to hematology. CLL?    Nonrheumatic mitral valve regurgitation 03/03/2021    Systolic murmur 4/6 noted 3/2021. TTE EF 56 - 60%. Mild to moderate mitral valve regurgitation, eccentric-anteriorly directed    Osteoarthritis of knee     Pituitary macroadenoma     Pneumonia     Prediabetes 03/08/2021    3/2021 A1c 6.49    Presbycusis of both ears 04/09/2021    Pseudopolyp of sigmoid colon without complication 04/09/2021    Right retinal detachment     2/2022    Seasonal allergic rhinitis due to pollen 02/22/2024    Sensorineural hearing loss (SNHL) of both ears 03/17/2021    3/2021 ENT. Also tinnitus. Recommended hearing aids    Sleep apnea     On CPAP consistently    Suprasellar mass 01/09/2023    Suprasellar mass on MRI 1/5/2023 - possible macroadenoma. Compression of optic nerve bilat    Tubular adenoma of colon 03/03/2021 4/2021: Tubular adenoma of sigmoid. No high grade.    Type 2 diabetes mellitus without complication, without long-term current use of insulin 11/11/2022 11/2022 A1c 6.7    Visual impairment 03/01/2023    tumor was pressing  optic nerve ,detached retina    Vitreous detachment of left eye     2/15/2022.       Past Surgical History:   Past Surgical History:   Procedure Laterality Date    BASAL CELL CARCINOMA EXCISION      CHEST    CATARACT EXTRACTION      december 2022    COLONOSCOPY      4/8/21    CYST REMOVAL      EPIDERMAL CYST EXTRACTION FROM BACK    FINGER GANGLION CYST EXCISION Right     3rd finger in the 90s    FOOT SURGERY  july 2024    in grown toe nail procedure    HAND SURGERY  1995    growth removal    INGUINAL HERNIA REPAIR Right     MOHS SURGERY      EXCISION FOR BASAL CELL CARCINOMA ON EAR    OTHER SURGICAL HISTORY      REMOVAL OF PITUITARY TUMOR WITH GAMMA KNIFE    RETINAL  DETACHMENT SURGERY      right eye  on 3/18/22    SINUS SURGERY  01/01/2023    went after pitutary tumor through nose       Family History:   Family History   Problem Relation Age of Onset    Arthritis Mother     Obesity Mother     Hypertension Mother     Kidney disease Mother     Diabetes Mother     Arthritis Brother     Migraines Brother     Arthritis Maternal Grandmother     Diabetes Maternal Grandmother     Obesity Maternal Grandmother     Stroke Maternal Grandmother     Hypertension Paternal Grandmother     Developmental Disability Son         Autism Spectrum Disorder       Social History:   Social History     Socioeconomic History    Marital status:    Tobacco Use    Smoking status: Never     Passive exposure: Never    Smokeless tobacco: Never   Vaping Use    Vaping status: Never Used   Substance and Sexual Activity    Alcohol use: Yes     Alcohol/week: 1.0 standard drink of alcohol     Types: 1 Cans of beer per week     Comment: socially    Drug use: Never    Sexual activity: Not Currently     Partners: Female     Comment: Wife Had Hysterectomy       Medications:   Current Outpatient Medications:     cetirizine (zyrTEC) 10 MG tablet, Take 1 tablet by mouth As Needed for Allergies., Disp: , Rfl:     chlorthalidone (HYGROTON) 25 MG tablet, Take 1 tablet by mouth once daily, Disp: 30 tablet, Rfl: 5    Diclofenac Sodium (VOLTAREN) 1 % gel gel, Apply 4 g topically to the appropriate area as directed 4 (Four) Times a Day As Needed., Disp: , Rfl:     GLUCOSAMINE-CHONDROITIN PO, Take 2 tablets by mouth Daily., Disp: , Rfl:     lisinopril (PRINIVIL,ZESTRIL) 10 MG tablet, Take 1 tablet by mouth Daily., Disp: 30 tablet, Rfl: 5    meloxicam (MOBIC) 7.5 MG tablet, Take 1 tablet by mouth Daily As Needed for Mild Pain., Disp: 30 tablet, Rfl: 5    metFORMIN (GLUCOPHAGE) 500 MG tablet, TAKE 1 TABLET BY MOUTH TWICE DAILY WITH MEALS, Disp: 180 tablet, Rfl: 1    multivitamin with minerals tablet tablet, Take 1 tablet by  "mouth Daily. Ran out, Disp: , Rfl:     Testosterone (AndroGel Pump) 20.25 MG/ACT (1.62%) gel, Apply 40.5 mg (2 pumps) to dry, intact skin of the shoulders or upper arms, Disp: 75 g, Rfl: 5    Triamcinolone Acetonide (NASACORT ALLERGY 24HR NA), 1 spray into the nostril(s) as directed by provider Daily., Disp: , Rfl:     methocarbamol (ROBAXIN) 500 MG tablet, Take 1 tablet by mouth 2 (Two) Times a Day As Needed for Muscle Spasms (scaitica)., Disp: 60 tablet, Rfl: 5    Allergies: No Known Allergies        Objective        Vital Signs:   Vitals:    04/22/25 0854   BP: 138/74   Pulse: 90   Temp: 98 °F (36.7 °C)   Weight: 103 kg (227 lb)   Height: 180.3 cm (71\")   PainSc: 6    PainLoc: Knee  Comment: wrists     Body mass index is 31.66 kg/m².      Physical Exam:  Physical Exam   MUSCULOSKELETAL:   No peripheral synovitis.  No dactylitis fingers.  No pitting of the nails.  No rheumatoid nodules or tophi  Scattered Heberden and Killian's nodes present hands  Positive Tinel and Phalen's testing bilateral wrists.  No muscle wasting hands.  Tender lumbar spine  Crepitus and painful range of motion bilateral knee.  No warmth or effusion.  Hammertoes present feet.    Complete joint exam was performed including the MCPs, PIPs, DIPs of the hands, wrists, elbows, shoulders, hips, knees and ankles.  No soft tissue swelling or tenderness is present except as above.    General: The patient is well-developed and well nourished. Cooperative, alert and oriented. Affect is normal. Hydration appears normal.   HEENT: Normocephalic and atraumatic. Lids and conjunctiva are normal. Pupils are equal and sclera are clear. Oropharynx is clear   NECK neck is supple without adenopathy, masses or thyromegaly.   CARDIOVASCULAR: Regular rate and rhythm. No murmurs, rubs or gallops   LUNGS: Effort is normal. Lungs are clear bilateral   ABDOMEN: Not examined  EXTREMITIES: Peripheral pulses are intact. No clubbing.   SKIN: No rashes. No subcutaneous " "nodules. No digital ulcers. No sclerodactyly.   NEUROLOGIC: Gait is normal. Strength testing is normal.  No focal neurologic deficits    Results Review:   Labs:   Lab Results   Component Value Date    GLUCOSE 106 (H) 11/14/2024    BUN 18 11/14/2024    CREATININE 1.10 11/14/2024    EGFR 74.0 11/14/2024    BCR 16.4 11/14/2024    K 4.9 11/14/2024    CO2 27.0 11/14/2024    CALCIUM 9.6 11/14/2024    ALBUMIN 4.1 11/14/2024    BILITOT 0.3 11/14/2024    AST 13 11/14/2024    ALT 18 11/14/2024     Lab Results   Component Value Date    WBC 88.70 (C) 11/14/2024    HGB 12.3 (L) 11/14/2024    HCT 36.7 (L) 11/14/2024    MCV 95.6 11/14/2024     (L) 11/14/2024     Lab Results   Component Value Date    SEDRATE 3 11/14/2024     Lab Results   Component Value Date    CRP 0.38 11/14/2024     No results found for: \"QUANTIFERO\", \"QUANTITB1\", \"QUANTITB2\", \"QUANTIFERN\", \"QUANTIFERM\", \"QUANTITBGLDP\"  No results found for: \"RF\"  Lab Results   Component Value Date    HEPCVIRUSABY Non-Reactive 03/08/2021         Procedures    Assessment / Plan      -Generalized osteoarthrosis  -Sciatica left leg   [facility]; wife Nicole ACL patient on [place] [place]  Brothers with psoriatic arthritis  No psoriasis himself.  Weakly positive rheumatoid factor  Chronic pain right wrist, right knee  **Current:  Meloxicam every other day(slight renal insufficiency August 2024), methocarbamol     Clinically I continue to find no evidence of rheumatoid arthritis, psoriatic arthritis or inflammatory arthritis  He has no features of lupus or connective tissue disease.     He likely has osteoarthritis causing back pain, right knee pain and stiffness  Joints feel improved with meloxicam every other day(decreased every other day with renal insufficiency August 2024 labs that has returned to normal).  Recommend meloxicam with topical diclofenac as needed for knee pain   Risks of NSAIDs discussed including GI upset, GI bleeding, renal " and hepatic risks and the risks of cardiovascular disease and stroke. Warned patient not to take with other NSAIDs including OTC NSAIDs.     Has had trouble with left leg sciatica in the interim  Recommend physical therapy.  Order provided.  -As needed methocarbamol prescribed.  Discussed risk of dizziness sedation falls  -Labs ordered for monitoring as below  Return to clinic 6 months     -Rheumatoid factor positive  False-positive weakly positive rheumatoid factor.    Repeat rheumatoid factor negative     Suspect false positive finding  Nothing clinically to suggest rheumatoid arthritis/inflammatory arthritis.    -Numbness and tingling in both hands  [facility]   Positive Tinel and Phalen's testing wrists.     I suspect he likely has carpal tunnel syndrome causing weakness and intermittent numbness in his hands right greater than left.  Recommend carpal tunnel wrist splints at night.     -CLL (chronic lymphocytic leukemia)  [facility] Dr. Saunders  Diagnosed 2021.     Chronically elevated white blood cell count   Following with [facility] Dr. Saunders     -Pituitary microadenoma  Pituitary macroadenoma (3.5 cm, visual defect, status post transsphenoidal pituitary resection 1/18/23)   ENT and  surgery Dr Montes  [facility] Dr. Bell    -History of diabetes mellitus  Endocrinology Dr. Bell.      1. Generalized osteoarthrosis, involving multiple sites    2. Sciatica of left side    3. Vitamin D deficiency    4. Rheumatoid factor positive    5. CLL (chronic lymphocytic leukemia)    6. Other fatigue        Assessment & Plan        Orders Placed This Encounter   Procedures    CBC Auto Differential    Comprehensive Metabolic Panel    C-reactive Protein    Sedimentation Rate    Vitamin B12    Vitamin D,25-Hydroxy    TSH    Ambulatory Referral to Physical Therapy for Evaluation & Treatment     New Medications Ordered This Visit   Medications    methocarbamol (ROBAXIN)  500 MG tablet     Sig: Take 1 tablet by mouth 2 (Two) Times a Day As Needed for Muscle Spasms (scaitica).     Dispense:  60 tablet     Refill:  5    meloxicam (MOBIC) 7.5 MG tablet     Sig: Take 1 tablet by mouth Daily As Needed for Mild Pain.     Dispense:  30 tablet     Refill:  5       Follow Up:   Return in about 6 months (around 10/22/2025).      Discussed plan of care in detail with the patient today.  Patient verbalized understanding and agrees.    I confirm accuracy of unchanged data/findings which have been carried forward from previous visit.  I have updated appropriately those that have changed.        Corbin Gutierrez MD  [facility] [place]

## 2025-04-23 NOTE — TELEPHONE ENCOUNTER
Pt returned my call.  He said that he has appt with Oncology next month.  He and his wife saw the lab results and MyChart and said it's actually higher than it was.  She said it fluctuates. -Jamia Lechuga, A

## 2025-05-21 ENCOUNTER — LAB (OUTPATIENT)
Dept: LAB | Facility: HOSPITAL | Age: 65
End: 2025-05-21
Payer: COMMERCIAL

## 2025-05-21 ENCOUNTER — OFFICE VISIT (OUTPATIENT)
Dept: INTERNAL MEDICINE | Facility: CLINIC | Age: 65
End: 2025-05-21
Payer: COMMERCIAL

## 2025-05-21 VITALS
DIASTOLIC BLOOD PRESSURE: 80 MMHG | HEART RATE: 70 BPM | HEIGHT: 71 IN | TEMPERATURE: 98 F | WEIGHT: 232 LBS | OXYGEN SATURATION: 95 % | BODY MASS INDEX: 32.48 KG/M2 | SYSTOLIC BLOOD PRESSURE: 120 MMHG

## 2025-05-21 DIAGNOSIS — C91.10 CLL (CHRONIC LYMPHOCYTIC LEUKEMIA): Chronic | ICD-10-CM

## 2025-05-21 DIAGNOSIS — E66.811 OBESITY (BMI 30.0-34.9): ICD-10-CM

## 2025-05-21 DIAGNOSIS — N18.31 STAGE 3A CHRONIC KIDNEY DISEASE: Chronic | ICD-10-CM

## 2025-05-21 DIAGNOSIS — I10 ESSENTIAL HYPERTENSION: Chronic | ICD-10-CM

## 2025-05-21 DIAGNOSIS — E23.0 HYPOGONADOTROPIC HYPOGONADISM: ICD-10-CM

## 2025-05-21 DIAGNOSIS — E78.5 DYSLIPIDEMIA: Chronic | ICD-10-CM

## 2025-05-21 DIAGNOSIS — E11.40 TYPE 2 DIABETES MELLITUS WITH DIABETIC NEUROPATHY, WITHOUT LONG-TERM CURRENT USE OF INSULIN: Chronic | ICD-10-CM

## 2025-05-21 DIAGNOSIS — Z00.00 WELL ADULT EXAM: Primary | ICD-10-CM

## 2025-05-21 DIAGNOSIS — R79.89 ELEVATED SERUM CREATININE: ICD-10-CM

## 2025-05-21 DIAGNOSIS — C91.10 CLL (CHRONIC LYMPHOCYTIC LEUKEMIA): ICD-10-CM

## 2025-05-21 PROBLEM — Z86.39 HISTORY OF DIABETES MELLITUS: Status: RESOLVED | Noted: 2024-10-22 | Resolved: 2025-05-21

## 2025-05-21 LAB
ALBUMIN UR-MCNC: <1.2 MG/DL
ANION GAP SERPL CALCULATED.3IONS-SCNC: 9.8 MMOL/L (ref 5–15)
BUN SERPL-MCNC: 19 MG/DL (ref 8–23)
BUN/CREAT SERPL: 15.4 (ref 7–25)
CALCIUM SPEC-SCNC: 9.4 MG/DL (ref 8.6–10.5)
CHLORIDE SERPL-SCNC: 103 MMOL/L (ref 98–107)
CO2 SERPL-SCNC: 27.2 MMOL/L (ref 22–29)
CREAT SERPL-MCNC: 1.23 MG/DL (ref 0.76–1.27)
CREAT UR-MCNC: 56 MG/DL
EGFRCR SERPLBLD CKD-EPI 2021: 64.7 ML/MIN/1.73
GLUCOSE SERPL-MCNC: 95 MG/DL (ref 65–99)
HBA1C MFR BLD: 6.7 % (ref 4.8–5.6)
MICROALBUMIN/CREAT UR: NORMAL MG/G{CREAT}
POTASSIUM SERPL-SCNC: 4.6 MMOL/L (ref 3.5–5.2)
SODIUM SERPL-SCNC: 140 MMOL/L (ref 136–145)

## 2025-05-21 PROCEDURE — 82043 UR ALBUMIN QUANTITATIVE: CPT

## 2025-05-21 PROCEDURE — 85007 BL SMEAR W/DIFF WBC COUNT: CPT

## 2025-05-21 PROCEDURE — 36415 COLL VENOUS BLD VENIPUNCTURE: CPT

## 2025-05-21 PROCEDURE — 82570 ASSAY OF URINE CREATININE: CPT

## 2025-05-21 PROCEDURE — 80048 BASIC METABOLIC PNL TOTAL CA: CPT

## 2025-05-21 PROCEDURE — 85025 COMPLETE CBC W/AUTO DIFF WBC: CPT

## 2025-05-21 PROCEDURE — 83036 HEMOGLOBIN GLYCOSYLATED A1C: CPT

## 2025-05-21 RX ORDER — ROSUVASTATIN CALCIUM 5 MG/1
5 TABLET, COATED ORAL DAILY
Qty: 90 TABLET | Refills: 1 | Status: SHIPPED | OUTPATIENT
Start: 2025-05-21

## 2025-05-21 NOTE — PROGRESS NOTES
"Chief Complaint  Sigifredo Walsh is a 64 y.o. male presenting for Annual Exam.     Patient has a past medical history of T2DM (11/2022) with mild neuropathy, pituitary failure after pituitary macroadenoma (3.5 cm, visual defect, s/p transsphenoidal pituitary resection, 1/18/2023) diverticulosis, hypertension, dyslipidemia, heart murmur (mitral valve insufficiency), obesity, osteoarthritis (eval rheum 2024), KARRIE on CPAP and CLL (2021, f/u Dr. Saunders, [facility]) and mitral valve regurgitation.     No nasal swabs, tubes or testing placed in patient's nose.  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    History of Present Illness  Patient is here accompanied by his wife.    He is following with vascular, they did procedure of his veins 2 weeks ago on the left side, right side 1 week ago, currently wrapped from ankle and up to proximal thigh.  Again procedure on the left side in 2 weeks planned.    He continues to follow-up with Dr. Bell.  They are planning MRI of his brain/pituitary next year.    He no longer follows up with ENT, just see them back as needed.    He has not had a check of his A1c since September.  We should check at least twice a year.  He will go for blood work today.    Does not do any regular exercise.  He sees the dentist 3-4 times yearly.    The following portions of the patient's history were reviewed and updated as appropriate: allergies, current medications, past family history, past medical history, past social history, past surgical history, and problem list.    Objective  /80 (BP Location: Left arm, Patient Position: Sitting, Cuff Size: Adult)   Pulse 70   Temp 98 °F (36.7 °C) (Infrared)   Ht 180.3 cm (70.98\")   Wt 105 kg (232 lb)   SpO2 95%   BMI 32.37 kg/m²     Physical Exam  Vitals reviewed.   Constitutional:       Appearance: Normal appearance.   HENT:      Head: Normocephalic and atraumatic.      Right Ear: Tympanic membrane, ear canal " and external ear normal. There is no impacted cerumen.      Left Ear: Tympanic membrane, ear canal and external ear normal. There is no impacted cerumen.      Nose: Nose normal. No congestion.      Mouth/Throat:      Mouth: Mucous membranes are moist.      Pharynx: Oropharynx is clear.   Eyes:      Extraocular Movements: Extraocular movements intact.      Conjunctiva/sclera: Conjunctivae normal.   Cardiovascular:      Rate and Rhythm: Normal rate and regular rhythm.      Pulses:           Dorsalis pedis pulses are 1+ on the right side and 1+ on the left side.        Posterior tibial pulses are 2+ on the right side and 2+ on the left side.      Heart sounds: Murmur heard.      Comments: Systolic murmur 3/6 over apex  Pulmonary:      Effort: Pulmonary effort is normal. No respiratory distress.      Breath sounds: Normal breath sounds. No wheezing.   Abdominal:      General: There is no distension.      Palpations: Abdomen is soft. There is no mass.      Tenderness: There is no abdominal tenderness.   Musculoskeletal:      Cervical back: Neck supple. No tenderness.      Right lower leg: No edema.      Left lower leg: No edema.      Right foot: Normal range of motion.      Left foot: Normal range of motion.   Feet:      Right foot:      Protective Sensation: 10 sites tested.  10 sites sensed.      Skin integrity: Skin integrity normal. No ulcer or skin breakdown.      Toenail Condition: Fungal disease present.     Left foot:      Protective Sensation: 10 sites tested.  10 sites sensed.      Skin integrity: Skin integrity normal. No ulcer or skin breakdown.      Toenail Condition: Fungal disease present.     Comments: Diabetic Foot Exam Performed and Monofilament Test Performed  No cracks or fissures.  Some fungal disease noted of the nails  Lymphadenopathy:      Cervical: No cervical adenopathy.   Skin:     General: Skin is warm and dry.   Neurological:      Mental Status: He is alert and oriented to person, place, and  time. Mental status is at baseline.   Psychiatric:         Behavior: Behavior normal.         Thought Content: Thought content normal.         Assessment/Plan   1. Well adult exam  Counseled on recommendations for COVID booster, patient is otherwise up-to-date on vaccines.  Counseled recommendations for moderate intensity exercise 2.5 hours weekly.  Counseled on recommendations for regular dental visits, patient goals more than 2 times yearly.    2. Type 2 diabetes mellitus with diabetic neuropathy, without long-term current use of insulin  Hemoglobin A1C   Date Value Ref Range Status   09/20/2024 6.8 (A) 4.5 - 5.7 % Final   05/20/2024 6.5 (A) 4.5 - 5.7 % Final   02/22/2024 6.6 (A) 4.5 - 5.7 % Final   05/24/2023 6.60 (H) 4.80 - 5.60 % Final   03/27/2023 6.0 (H) <5.7 % Final   01/19/2023 6.4 (H) <5.7 % Final   02/11/2022 6.30 (H) 4.80 - 5.60 % Final   08/11/2021 5.94 (H) 4.80 - 5.60 % Final   Patient is due for recheck of his A1c today.  For now continue on metformin 500 mg twice daily.  - Basic Metabolic Panel; Future  - Hemoglobin A1c; Future  - Microalbumin / Creatinine Urine Ratio - Urine, Clean Catch; Future    3. Stage 3a chronic kidney disease  Will recheck blood work today.  Counseled on avoiding over-the-counter anti-inflammatory medications like ibuprofen, he does use occasionally.  I also wanted to remove the meloxicam 7.5 mg from his medication list, but he states he barely ever uses and for now would like it left on his list.  Counseled on adequate hydration.  For pain I would rather recommend taking Tylenol 500-1000 mg every 6-8 hours, and avoid use of NSAIDs like ibuprofen, meloxicam    4. Dyslipidemia  His LDL is good, HDL is low, but because of his diabetes it is recommended to start on cholesterol-lowering medication.  Patient is willing to try rosuvastatin, will start at the lowest dose 5 mg.  Recheck in about 3 months.  Counseled on side effects, especially muscle aches/myalgias.  - rosuvastatin  (Crestor) 5 MG tablet; Take 1 tablet by mouth Daily.  Dispense: 90 tablet; Refill: 1    5. Essential hypertension  BP Readings from Last 3 Encounters:   05/21/25 120/80   04/22/25 138/74   02/04/25 108/72   Blood pressure improved and now at goal.  Continue current medications    6. CLL (chronic lymphocytic leukemia)  He is having some fatigue, but otherwise doing fairly well.  Continue follow-up with hematology oncology    7. Hypogonadotropic hypogonadism  Continue follow-up with endocrinology.    8. Obesity (BMI 30.0-34.9)  Wt Readings from Last 3 Encounters:   05/21/25 105 kg (232 lb)   04/22/25 103 kg (227 lb)   02/04/25 105 kg (231 lb)       Advance Care Planning   ACP discussion was held with the patient during this visit. Patient does not have an advance directive, information provided.         Return in about 14 weeks (around 8/27/2025) for Recheck.    Future Appointments         Provider Department Center    5/23/2025 11:00 AM (Arrive by 10:45 AM) Huma Somers APRN [facility]AT[facility]    8/15/2025 9:00 AM (Arrive by 8:45 AM) Jasper Zhao MD [facility] INTERNAL MEDICINE [facility]    9/16/2025 9:30 AM (Arrive by 9:15 AM) Ayde Bell MD [facility]CRINOLOGY [facility]    10/22/2025 9:30 AM (Arrive by 9:15 AM) Corbin Gutierrez MD [facility] R[facility]UMATOLOGY [facility]    5/22/2026 8:00 AM (Arrive by 7:45 AM) Jasper Zhao MD [facility] INTER[facility] MEDIC[facility]              Jasper Zhao MD  Family Medicine  05/21/2025

## 2025-05-22 ENCOUNTER — TELEPHONE (OUTPATIENT)
Dept: ONCOLOGY | Facility: CLINIC | Age: 65
End: 2025-05-22
Payer: COMMERCIAL

## 2025-05-22 DIAGNOSIS — C91.10 CLL (CHRONIC LYMPHOCYTIC LEUKEMIA): Primary | ICD-10-CM

## 2025-05-22 LAB
DEPRECATED RDW RBC AUTO: 48.7 FL (ref 37–54)
ERYTHROCYTE [DISTWIDTH] IN BLOOD BY AUTOMATED COUNT: 13.7 % (ref 12.3–15.4)
HCT VFR BLD AUTO: 35.4 % (ref 37.5–51)
HGB BLD-MCNC: 12.1 G/DL (ref 13–17.7)
LYMPHOCYTES # BLD MANUAL: 93.89 10*3/MM3 (ref 0.7–3.1)
LYMPHOCYTES NFR BLD MANUAL: 9 % (ref 5–12)
MCH RBC QN AUTO: 33.2 PG (ref 26.6–33)
MCHC RBC AUTO-ENTMCNC: 34.2 G/DL (ref 31.5–35.7)
MCV RBC AUTO: 97.3 FL (ref 79–97)
MONOCYTES # BLD: 9.39 10*3/MM3 (ref 0.1–0.9)
NEUTROPHILS # BLD AUTO: 1.04 10*3/MM3 (ref 1.7–7)
NEUTROPHILS NFR BLD MANUAL: 1 % (ref 42.7–76)
PLAT MORPH BLD: NORMAL
PLATELET # BLD AUTO: 146 10*3/MM3 (ref 140–450)
PMV BLD AUTO: 10.5 FL (ref 6–12)
RBC # BLD AUTO: 3.64 10*6/MM3 (ref 4.14–5.8)
RBC MORPH BLD: NORMAL
VARIANT LYMPHS NFR BLD MANUAL: 90 % (ref 19.6–45.3)
WBC MORPH BLD: NORMAL
WBC NRBC COR # BLD AUTO: 104.32 10*3/MM3 (ref 3.4–10.8)

## 2025-05-22 NOTE — TELEPHONE ENCOUNTER
Name of Physician notified: Huma Somers, APRN    Time Physician Notified: [id number]      []  Orders received    []  Protocol/Standing orders followed    [x]  No new orders

## 2025-05-22 NOTE — TELEPHONE ENCOUNTER
Critical Test Results      MD: Dr. Saunders    Date: 5/22/25      Critical test result: .32     Time results received: 16:14

## 2025-05-23 ENCOUNTER — OFFICE VISIT (OUTPATIENT)
Dept: ONCOLOGY | Facility: CLINIC | Age: 65
End: 2025-05-23
Payer: COMMERCIAL

## 2025-05-23 VITALS
TEMPERATURE: 98.1 F | SYSTOLIC BLOOD PRESSURE: 120 MMHG | DIASTOLIC BLOOD PRESSURE: 70 MMHG | RESPIRATION RATE: 18 BRPM | WEIGHT: 234 LBS | OXYGEN SATURATION: 95 % | HEART RATE: 70 BPM | BODY MASS INDEX: 32.76 KG/M2 | HEIGHT: 71 IN

## 2025-05-23 DIAGNOSIS — C91.10 CLL (CHRONIC LYMPHOCYTIC LEUKEMIA): Primary | Chronic | ICD-10-CM

## 2025-05-23 PROCEDURE — 99214 OFFICE O/P EST MOD 30 MIN: CPT | Performed by: NURSE PRACTITIONER

## 2025-05-23 NOTE — PROGRESS NOTES
CHIEF COMPLAINT: CLL    Problem List:  Oncology/Hematology History Overview Note   1.  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  Tinnitus  3.  Fungal nail infection  4.  Osteoarthritis.  Rheumatoid factor initially weakly positive negative on repeat.  Seeing Dr. Corbin Gutierrez.  5.  Sleep apnea on CPAP  6.  History of right inguinal herniorrhaphy  7.  Diverticulosis  8.  Hypertension  9.  Status post transsphenoidal hypophysectomy in for nonfunctional pituitary macroadenoma with lateral field cuts improved postop  10.  Venous insufficiency chronic secondary to varicose veins bilateral lower extremities    Hematology history timeline:  -3/8/2021 white count 39,560 with hemoglobin 14.1, platelets 138,000, 81% lymphocytes.  Peripheral smear shows smudge cells with atypical lymphocytes with enlarged nuclei with inconspicuous nucleoli.  An adequate volume for flow cytometry on initial blood draw.  C-reactive protein less than 0.3.  PSA normal 3.69 glucose 119 otherwise unremarkable CMP.      -3/19/2021 [facility] hematology oncology initial consultation: We will get peripheral blood flow cytometry flow cytometric hematolymphoid neoplasia assessment including ZAP70/CD38, cytogenetics, B-cell rearrangement, immunoglobulin variable heavy chain and p53 mutational analysis sequencing will be done, as well as snip MicroArray for CLL.  Absolute neutrophil count is normal at 4350 with absolute lymphocyte count 32,040.  Without frequent infections I will not check quantitative immunoglobulins.  CT scans are not necessary for diagnosis, surveillance, routine monitoring of treatment response, or progression.  CT scans may be warranted if symptoms of bulky disease or the assessment of risk for tumor lysis syndrome prior to initiation of venetoclax might be considered.  We will check lactate dehydrogenase, beta-2 microglobulin and uric acid.   Lymphocytosis itself is not an indication for treatment.  NCCN guideline indications for treatment include:  Fatigue severe  Night sweats  Unexplained weight loss  Fever without infection  Threatened endorgan function due to bulk  Spleen greater than 6 cm below costal margin  Lymph nodes greater than 10 cm  Progressive nonhemolytic anemia hemoglobin less than 10 (hemolytic anemia treated with steroids)  Progressive nonimmune mediated destruction thrombocytopenia platelets less than 100,000 (ITP)  Steroid refractory cytopenias    If these thresholds are reached, the molecular mutational analysis I am doing will guide the specific therapy I recommend.  Presently he does not have any of those clinical thresholds.  He does have swollen MCP and PIP joints with brothers who have psoriatic arthritis and I will check his sedimentation rate, C-reactive protein, rheumatoid factor, and extractable nuclear antigens as well and ultimately may need rheumatological referral.  Rheumatologic diseases can cause some lymphocytosis including monoclonal lymphocytic process but this would be higher lymphocyte count than I would expect from such processes.    -3/19/2021 data:  White count 36,900, hemoglobin 14.2, platelets 154,000, absolute neutrophil count 7900, absolute lymphocyte count 26,500.  CMP glucose 112, bicarb 32, otherwise unremarkable with creatinine 1.19 and normal liver enzymes.  Magnesium 2.1  Hemolytic panel: MARIS negative.  .  Uric acid normal 6.8.  ELLIE negative.  Rheumatoid factor negative.  proBNP normal 6.1  Sedimentation rate less than 1 with C-reactive protein 0.38.  Beta-2 microglobulin 3.5 upper limit 2.2  Flow cytometry: Clonal CD5 positive B-cell population 70% of analyzed cells consistent with CLL/SLL  ZAP70 positive/CD 38+: Double positive considered unfavorable and tends to correlate with immunoglobulin variable heavy chain unmutated status.  Immunoglobulin variable heavy chain somatic hyper mutation  analysis did not yield interpretable results.  Cancer cytogenetic analysis showed 47, XY, +12 in all 20 metaphases cells.  CLL FISH panel positive for trisomy 12 which is detected in 20% of B-cell CLL which is associated with an intermediate-poor prognosis  B-cell immunoglobulin heavy and kappa light chain gene rearrangements detected corroborating of B-cell neoplasm.  T p53 mutation analysis negative by DNA sequencing.    -4/9/2021 [facility] medical oncology follow-up visit: I reviewed the above molecular data with the patient.  The ZAP70 and CD38 and trisomy 12 positivity put him at an intermediate to high risk of progression I will watch him fairly closely.  I will repeat his CBC and CMP and physical exam in 2 months.  Thresholds for therapy as outlined above.  Ultimately what we choose to treat at that point depends on the COVID-19 pandemic status as much as his disease markers but he has not p53 mutated so we have a broader range of options.    -6/10/2021 [facility] hematology follow-up visit: 6/4/2021 data reveals white count 43,850 with hemoglobin 12.8 platelets 147,000.  Absolute neutrophil count normal 4820.  No bulky adenopathy.  We will repeat labs again in 2 months.  Indications/thresholds for therapy for CLL as indicated above have not been reached.    -9/10/2021 [facility] medical oncology follow-up visit: White count today is 52,700.  Hemoglobin is 13.9 with platelets 196,000 and no bulky adenopathy, fevers, chills, unexplained weight loss, effusions, bed drenching night sweats.  Hence has not reached NCCN guideline indications for treatment as outlined above.  We will see him back in 3 months with monthly CBC in the interim.  He has not had doubling of his lymphocyte count in less than 6 months either.  Recommended Covid vaccination with booster.    -12/20/2021 [facility] Oncology clinic follow-up:  Continues to do well on surveillance, white count stable currently at 59,100, absolute lymphocytes 51.42, no  anemia or thrombocytopenia.  He has no lymphadenopathy or other symptoms suggestive of disease progression.  We will continue with monthly CBC.  We will see him back in 3 months for follow-up.  If counts remain stable at that time we may be able to extend the time frame of checking his CBC out a little bit.  Indications for treatment per NCCN guidelines are listed above note dated 3/19/2021.    -3/21/2022 [facility] medical oncology follow-up visit: Clinically he is doing well with no new somatic complaints.  His white count is actually lower over the last 3 months now 53,930 with a hemoglobin of 12.4 and platelets 166,000 with absolute neutrophil count quite adequate at 2700 and no frequent infections.  We will check his CBC in 3 months and again just prior to return in 6 months given stability of counts over time with no significant rise in white count and no triggers for need of treatment.    -9/30/2022 [facility] Oncology/Hematology clinic follow-up: Mr. Walsh continues to do well, still no indication for treatment.  CBC is stable with WBC 62,590, mild anemia with hemoglobin of 12.5, hematocrit 37.2%, MCV normal at 96.6, platelet count normal at 149,000.  He has no new worrisome symptoms.  No lymphadenopathy.  We will continue with observation, we will repeat CBC every 3 months and I have ordered that today.  We will see him back in 6 months for follow-up.    - 2/21/2023 follow-up Dr.Saini MORRIS ENT.  He had transsphenoidal hypophysis ectomy with right nasoseptal flap 1/18/2023.  Nasal congestion improved.  No watery drainage.  Back on CPAP.  Slow improvement of smell.  Had large sellar mass.  Found on evaluation by Dr. Bell endocrinology at [facility].  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  Postop MRI showed possible residual near cavernous sinus Per Sammy review of postop.  Improvement in visual field cuts.  Following with Dr. Bell.   Follow-up with Dr. Christianson with cerebrovascular, skull-based, and endovascular neurosurgical group at  mid-March.    -2/24/2023 [facility] medical oncology hematology follow-up:White count today virtually identical to September 2022… White count 62,360.  Hemoglobin slightly lower at 10.9 but with normochromic normocytic indices and platelets normal 159,000 with absolute lymphocyte count 54,540 and normal absolute neutrophil count 5160.  No bulky palpable cervical axillary or inguinal adenopathy.  No frequent infections.  No hint of this progressing CLL.  We will repeat his labs on return in 6 months.    -8/29/2023 [facility] Hematology/Oncology clinic follow-up: Mr. Walsh is doing well, he has no new worrisome symptoms, his CBC is stable as shown above.  WBC slightly higher at 75,870, hemoglobin is good at 12.6 with hematocrit 37.6%, platelets 122,000, absolute lymphocytes 67.42.  He has had no doubling of his counts.  He has no adenopathy, no frequent infections, no unusual fatigue.  We will continue monitoring CBC, I will check it again in 3 months since his platelets dropped somewhat but they are still well above 100,000, will also check on return in 6 months.    -2/29/2024 [facility] hematology oncology follow-up: He did not have CBC done since last we saw him.White count is 83,620 with hemoglobin 11.6 fairly stable over time.  Platelets slightly lower at 111,000.  Absolute lymphocyte count 74,820 up from 54,000 a year ago hence lymphocyte count doubling time is not 6 months or less and he still has no bulky adenopathy on exam or hemoglobin less than 10 hemolytic in nature or platelet count less than 100,000 not due to ITP.  No unexplained fatigue or frequent infections or unexplained B symptoms will repeat CBC in 6 months with no current indication for treatment.    -8/30/2024 [facility] hematology clinic follow-up: Sigifredo overall is doing well.  CBC reviewed from yesterday, WBC continues to climb, currently WBC 97.14  "with absolute lymphocytes 91.31 compared to WBC 6 months ago of 83.62 with absolute lymphocytes 74.82, no significant anemia, hemoglobin currently 12.1 with hematocrit 37.7%, platelet count acceptable to 125,000, ANC 5.83.  He has no \"B symptoms\".  He has no lymphadenopathy, no abnormal weight loss, no frequent illnesses or infections.  No unusual fatigue.  We will continue monitoring however I will check his CBC again in 3 months for a little closer surveillance.    -9/19/2024 white count 84,750 hemoglobin 12.1 platelets 112,000.  Absolute lymphocyte count 91,310.  -11/14/2024 white count 88,700 hemoglobin 12.3 platelets 131,000.  Absolute lymphocyte count 78,940 with 8% atypical lymphocytes.  CMP unremarkable.C-reactive protein 0.38.  Sedimentation rate 3.  Uric acid 7.3 upper limit of normal 7.  HLA-B27 negative.    -11/22/2024 [facility] hematology oncology clinic follow-up: He has no signs or symptoms to suggest progression of his CLL and does not meet criteria for treatment as outlined above on the NCCN guideline.  Will repeat CBC prior to return in 6 months.  He is up-to-date with his vaccinations.     CLL (chronic lymphocytic leukemia)   3/19/2021 Initial Diagnosis    CLL (chronic lymphocytic leukemia) (CMS/HCC)     4/9/2021 Cancer Staged    Staging form: Chronic Lymphocytic Leukemia / Small Lymphocytic Lymphoma, AJCC 8th Edition  - Clinical: Modified Mina Stage 0 (Modified Mina risk: Low, Lymphocytosis: Present, Adenopathy: Absent, Organomegaly: Absent, Anemia: Absent, Thrombocytopenia: Absent) - Signed by Levi Saunders MD on 4/9/2021         HISTORY OF PRESENT ILLNESS:  The patient is a 64 y.o. male, here for follow up on management of CLL.  Sigifredo reports overall he is doing well.  He has been following with [facility] for management of ulcer on his leg, he has venous insufficiency and significant varicosities for which he is undergoing treatment with Dr. Robles.  His wife who is with him today " states that it is likely hereditary and his brother is currently undergoing the same procedures.  Otherwise he has no lymphadenopathy that he is aware of.  No bed drenching night sweats.  His appetite is good, no change in his bowel or bladder habits.    Past Medical History:   Diagnosis Date    Allergic 07/01/2023    Seasonal due to prior nasal surgery changing lining of nose    Basal cell carcinoma     Cancer CLL    Cataract     Bilateral    CLL (chronic lymphocytic leukemia)     CTS (carpal tunnel syndrome)     Diabetes     Diverticulosis     Dyslipidemia 05/21/2025    Essential hypertension 03/2021    3/21 started Lisinopril    GERD (gastroesophageal reflux disease)     Inflammatory bowel disease     Lymphocytosis 03/08/2021    3/2021. WBC 39.56. Abs lymphocyte 38. Plt 138. Hgb 14.1. Referral to hematology. CLL?    Nonrheumatic mitral valve regurgitation 03/03/2021    Systolic murmur 4/6 noted 3/2021. TTE EF 56 - 60%. Mild to moderate mitral valve regurgitation, eccentric-anteriorly directed    Osteoarthritis of knee     Pituitary adenoma 01/05/2023    Suspected surgery to remove 01/18/2023 at     Pituitary macroadenoma     Pneumonia     Prediabetes 03/08/2021    3/2021 A1c 6.49    Presbycusis of both ears 04/09/2021    Pseudopolyp of sigmoid colon without complication 04/09/2021    Right retinal detachment     2/2022    Seasonal allergic rhinitis due to pollen 02/22/2024    Sensorineural hearing loss (SNHL) of both ears 03/17/2021    3/2021 ENT. Also tinnitus. Recommended hearing aids    Sleep apnea     On CPAP consistently    Stage 3a chronic kidney disease 05/21/2025    Suprasellar mass 01/09/2023    Suprasellar mass on MRI 1/5/2023 - possible macroadenoma. Compression of optic nerve bilat    Tubular adenoma of colon 03/03/2021 4/2021: Tubular adenoma of sigmoid. No high grade.    Type 2 diabetes mellitus without complication, without long-term current use of insulin 11/11/2022 11/2022 A1c 6.7     "Visual impairment 03/01/2023    tumor was pressing  optic nerve ,detached retina    Vitreous detachment of left eye     2/15/2022.     Past Surgical History:   Procedure Laterality Date    BASAL CELL CARCINOMA EXCISION      CHEST    CATARACT EXTRACTION      december 2022    COLONOSCOPY      4/8/21    CYST REMOVAL      EPIDERMAL CYST EXTRACTION FROM BACK    FINGER GANGLION CYST EXCISION Right     3rd finger in the 90s    FOOT SURGERY  july 2024    in grown toe nail procedure    HAND SURGERY  1995    growth removal    INGUINAL HERNIA REPAIR Right     MOHS SURGERY      EXCISION FOR BASAL CELL CARCINOMA ON EAR    OTHER SURGICAL HISTORY      REMOVAL OF PITUITARY TUMOR WITH GAMMA KNIFE    PITUITARY SURGERY  01/18/2023     removed most of tumor    RETINAL DETACHMENT SURGERY      right eye  on 3/18/22    SINUS SURGERY  01/01/2023    went after pitutary tumor through nose       No Known Allergies    Family History and Social History reviewed and changed as necessary    REVIEW OF SYSTEM:   No new somatic concerns    PHYSICAL EXAM:  Well-developed, well-nourished appearing male in no distress  Respirations regular nonlabored  Heart regular rate and rhythm.  He is wearing lower extremity compression stockings bilaterally    Vitals:    05/23/25 1051   BP: 120/70   Pulse: 70   Resp: 18   Temp: 98.1 °F (36.7 °C)   SpO2: 95%   Weight: 106 kg (234 lb)   Height: 180.3 cm (71\")     Vitals:    05/23/25 1051   PainSc: 0-No pain  Comment: No new pain.          ECOG score: 0           Vitals reviewed.  Labs reviewed    ECOG: (0) Fully Active - Able to Carry On All Pre-disease Performance Without Restriction    Lab Results   Component Value Date    HGB 12.1 (L) 05/21/2025    HCT 35.4 (L) 05/21/2025    MCV 97.3 (H) 05/21/2025     05/21/2025    .32 (C) 05/21/2025    NEUTROABS 1.04 (L) 05/21/2025    LYMPHSABS 74.82 (H) 02/28/2024    MONOSABS 4.43 (H) 02/28/2024    EOSABS 0.00 08/29/2024    BASOSABS 0.00 08/29/2024       Lab " Results   Component Value Date    GLUCOSE 95 05/21/2025    BUN 19 05/21/2025    CREATININE 1.23 05/21/2025     05/21/2025    K 4.6 05/21/2025     05/21/2025    CO2 27.2 05/21/2025    CALCIUM 9.4 05/21/2025    PROTEINTOT 6.1 04/22/2025    ALBUMIN 4.2 04/22/2025    BILITOT 0.3 04/22/2025    ALKPHOS 65 04/22/2025    AST 16 04/22/2025    ALT 16 04/22/2025             ASSESSMENT & PLAN:  1.  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  Tinnitus  3.  Fungal nail infection  4.  Osteoarthritis.  Rheumatoid factor initially weakly positive negative on repeat.  Seeing Dr. Corbin Gutierrez.  5.  Sleep apnea on CPAP  6.  History of right inguinal herniorrhaphy  7.  Diverticulosis  8.  Hypertension  9.  Status post transsphenoidal hypophysectomy in for nonfunctional pituitary macroadenoma with lateral field cuts improved postop  10. Venous insufficiency chronic secondary to varicose veins bilateral lower extremities     Hematology history timeline:  -3/8/2021 white count 39,560 with hemoglobin 14.1, platelets 138,000, 81% lymphocytes.  Peripheral smear shows smudge cells with atypical lymphocytes with enlarged nuclei with inconspicuous nucleoli.  An adequate volume for flow cytometry on initial blood draw.  C-reactive protein less than 0.3.  PSA normal 3.69 glucose 119 otherwise unremarkable CMP.      -3/19/2021 [facility] hematology oncology initial consultation: We will get peripheral blood flow cytometry flow cytometric hematolymphoid neoplasia assessment including ZAP70/CD38, cytogenetics, B-cell rearrangement, immunoglobulin variable heavy chain and p53 mutational analysis sequencing will be done, as well as snip MicroArray for CLL.  Absolute neutrophil count is normal at 4350 with absolute lymphocyte count 32,040.  Without frequent infections I will not check quantitative immunoglobulins.  CT scans are not necessary for  diagnosis, surveillance, routine monitoring of treatment response, or progression.  CT scans may be warranted if symptoms of bulky disease or the assessment of risk for tumor lysis syndrome prior to initiation of venetoclax might be considered.  We will check lactate dehydrogenase, beta-2 microglobulin and uric acid.  Lymphocytosis itself is not an indication for treatment.  NCCN guideline indications for treatment include:  Fatigue severe  Night sweats  Unexplained weight loss  Fever without infection  Threatened endorgan function due to bulk  Spleen greater than 6 cm below costal margin  Lymph nodes greater than 10 cm  Progressive nonhemolytic anemia hemoglobin less than 10 (hemolytic anemia treated with steroids)  Progressive nonimmune mediated destruction thrombocytopenia platelets less than 100,000 (ITP)  Steroid refractory cytopenias    If these thresholds are reached, the molecular mutational analysis I am doing will guide the specific therapy I recommend.  Presently he does not have any of those clinical thresholds.  He does have swollen MCP and PIP joints with brothers who have psoriatic arthritis and I will check his sedimentation rate, C-reactive protein, rheumatoid factor, and extractable nuclear antigens as well and ultimately may need rheumatological referral.  Rheumatologic diseases can cause some lymphocytosis including monoclonal lymphocytic process but this would be higher lymphocyte count than I would expect from such processes.    -3/19/2021 data:  White count 36,900, hemoglobin 14.2, platelets 154,000, absolute neutrophil count 7900, absolute lymphocyte count 26,500.  CMP glucose 112, bicarb 32, otherwise unremarkable with creatinine 1.19 and normal liver enzymes.  Magnesium 2.1  Hemolytic panel: MARIS negative.  .  Uric acid normal 6.8.  ELLIE negative.  Rheumatoid factor negative.  proBNP normal 6.1  Sedimentation rate less than 1 with C-reactive protein 0.38.  Beta-2 microglobulin 3.5  upper limit 2.2  Flow cytometry: Clonal CD5 positive B-cell population 70% of analyzed cells consistent with CLL/SLL  ZAP70 positive/CD 38+: Double positive considered unfavorable and tends to correlate with immunoglobulin variable heavy chain unmutated status.  Immunoglobulin variable heavy chain somatic hyper mutation analysis did not yield interpretable results.  Cancer cytogenetic analysis showed 47, XY, +12 in all 20 metaphases cells.  CLL FISH panel positive for trisomy 12 which is detected in 20% of B-cell CLL which is associated with an intermediate-poor prognosis  B-cell immunoglobulin heavy and kappa light chain gene rearrangements detected corroborating of B-cell neoplasm.  T p53 mutation analysis negative by DNA sequencing.    -4/9/2021 [facility] medical oncology follow-up visit: I reviewed the above molecular data with the patient.  The ZAP70 and CD38 and trisomy 12 positivity put him at an intermediate to high risk of progression I will watch him fairly closely.  I will repeat his CBC and CMP and physical exam in 2 months.  Thresholds for therapy as outlined above.  Ultimately what we choose to treat at that point depends on the COVID-19 pandemic status as much as his disease markers but he has not p53 mutated so we have a broader range of options.    -6/10/2021 [facility] hematology follow-up visit: 6/4/2021 data reveals white count 43,850 with hemoglobin 12.8 platelets 147,000.  Absolute neutrophil count normal 4820.  No bulky adenopathy.  We will repeat labs again in 2 months.  Indications/thresholds for therapy for CLL as indicated above have not been reached.    -9/10/2021 [facility] medical oncology follow-up visit: White count today is 52,700.  Hemoglobin is 13.9 with platelets 196,000 and no bulky adenopathy, fevers, chills, unexplained weight loss, effusions, bed drenching night sweats.  Hence has not reached NCCN guideline indications for treatment as outlined above.  We will see him back in 3 months  with monthly CBC in the interim.  He has not had doubling of his lymphocyte count in less than 6 months either.  Recommended Covid vaccination with booster.    -12/20/2021 [facility] Oncology clinic follow-up:  Continues to do well on surveillance, white count stable currently at 59,100, absolute lymphocytes 51.42, no anemia or thrombocytopenia.  He has no lymphadenopathy or other symptoms suggestive of disease progression.  We will continue with monthly CBC.  We will see him back in 3 months for follow-up.  If counts remain stable at that time we may be able to extend the time frame of checking his CBC out a little bit.  Indications for treatment per NCCN guidelines are listed above note dated 3/19/2021.    -3/21/2022 [facility] medical oncology follow-up visit: Clinically he is doing well with no new somatic complaints.  His white count is actually lower over the last 3 months now 53,930 with a hemoglobin of 12.4 and platelets 166,000 with absolute neutrophil count quite adequate at 2700 and no frequent infections.  We will check his CBC in 3 months and again just prior to return in 6 months given stability of counts over time with no significant rise in white count and no triggers for need of treatment.    -9/30/2022 [facility] Oncology/Hematology clinic follow-up: Mr. Walsh continues to do well, still no indication for treatment.  CBC is stable with WBC 62,590, mild anemia with hemoglobin of 12.5, hematocrit 37.2%, MCV normal at 96.6, platelet count normal at 149,000.  He has no new worrisome symptoms.  No lymphadenopathy.  We will continue with observation, we will repeat CBC every 3 months and I have ordered that today.  We will see him back in 6 months for follow-up.    - 2/21/2023 follow-up   ENT.  He had transsphenoidal hypophysis ectomy with right nasoseptal flap 1/18/2023.  Nasal congestion improved.  No watery drainage.  Back on CPAP.  Slow improvement of smell.  Had large sellar mass.  Found on  evaluation by Dr. Bell endocrinology at [facility].  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  Postop MRI showed possible residual near cavernous sinus Per Sammy review of postop.  Improvement in visual field cuts.  Following with Dr. Bell.  Follow-up with Dr. Christianson with cerebrovascular, skull-based, and endovascular neurosurgical group at  mid-March.    -2/24/2023 [facility] medical oncology hematology follow-up:White count today virtually identical to September 2022… White count 62,360.  Hemoglobin slightly lower at 10.9 but with normochromic normocytic indices and platelets normal 159,000 with absolute lymphocyte count 54,540 and normal absolute neutrophil count 5160.  No bulky palpable cervical axillary or inguinal adenopathy.  No frequent infections.  No hint of this progressing CLL.  We will repeat his labs on return in 6 months.    -8/29/2023 [facility] Hematology/Oncology clinic follow-up: Mr. Walsh is doing well, he has no new worrisome symptoms, his CBC is stable as shown above.  WBC slightly higher at 75,870, hemoglobin is good at 12.6 with hematocrit 37.6%, platelets 122,000, absolute lymphocytes 67.42.  He has had no doubling of his counts.  He has no adenopathy, no frequent infections, no unusual fatigue.  We will continue monitoring CBC, I will check it again in 3 months since his platelets dropped somewhat but they are still well above 100,000, will also check on return in 6 months.    -2/29/2024 [facility] hematology oncology follow-up: He did not have CBC done since last we saw him.White count is 83,620 with hemoglobin 11.6 fairly stable over time.  Platelets slightly lower at 111,000.  Absolute lymphocyte count 74,820 up from 54,000 a year ago hence lymphocyte count doubling time is not 6 months or less and he still has no bulky adenopathy on exam or hemoglobin less than 10 hemolytic in nature or platelet count less than  "100,000 not due to ITP.  No unexplained fatigue or frequent infections or unexplained B symptoms will repeat CBC in 6 months with no current indication for treatment.    -8/30/2024 [facility] [place] hematology clinic follow-up: Sigifredo overall is doing well.  CBC reviewed from yesterday, WBC continues to climb, currently WBC 97.14 with absolute lymphocytes 91.31 compared to WBC 6 months ago of 83.62 with absolute lymphocytes 74.82, no significant anemia, hemoglobin currently 12.1 with hematocrit 37.7%, platelet count acceptable to 125,000, ANC 5.83.  He has no \"B symptoms\".  He has no lymphadenopathy, no abnormal weight loss, no frequent illnesses or infections.  No unusual fatigue.  We will continue monitoring however I will check his CBC again in 3 months for a little closer surveillance.    -9/19/2024 white count 84,750 hemoglobin 12.1 platelets 112,000.  Absolute lymphocyte count 91,310.  -11/14/2024 white count 88,700 hemoglobin 12.3 platelets 131,000.  Absolute lymphocyte count 78,940 with 8% atypical lymphocytes.  CMP unremarkable.C-reactive protein 0.38.  Sedimentation rate 3.  Uric acid 7.3 upper limit of normal 7.  HLA-B27 negative.    -11/22/2024 [facility] [place] hematology oncology clinic follow-up: He has no signs or symptoms to suggest progression of his CLL and does not meet criteria for treatment as outlined above on the NCCN guideline.  Will repeat CBC prior to return in 6 months.  He is up-to-date with his vaccinations.    -5/23/2025 [facility] [place] hematology/oncology clinic follow-up: While his white count does continue to climb, currently it is 104,320 with absolute lymphocytes of 93.89, he has no other criteria suggestive of progression of CLL.  Hemoglobin is stable at 12.1 with hematocrit 35.4%, platelet count normal at 146,000.  Some of his elevation of white count could be due to little more inflammation, he has been having procedures done with [place] [facility] for repair of bilateral varicosities and also had an " ulcer on his lower leg.  His ANC is a little lower than usual at 1.04, discussed with Dr. Saunders and we will continue to monitor but for now no changes, he has had no frequent infections.  He has no lymphadenopathy, no bit drenching night sweats, no abnormal weight loss.  We will continue surveillance with repeat CBC in 3 months and again prior to return in 6 months.    I spent 32 minutes caring for Sigifredo on this date of service. This time includes time spent by me in the following activities: preparing for the visit, reviewing tests, obtaining and/or reviewing a separately obtained history, performing a medically appropriate examination and/or evaluation, ordering medications, tests, or procedures, referring and communicating with other health care professionals, documenting information in the medical record, and independently interpreting results and communicating that information with the patient/family/caregiver.     Huma Somers, APRN    05/23/2025

## 2025-06-03 DIAGNOSIS — E23.0 HYPOGONADOTROPIC HYPOGONADISM: ICD-10-CM

## 2025-06-03 RX ORDER — TESTOSTERONE 1.62 MG/G
GEL TRANSDERMAL
Qty: 75 G | Refills: 5 | Status: SHIPPED | OUTPATIENT
Start: 2025-06-03

## 2025-06-03 NOTE — TELEPHONE ENCOUNTER
Rx Refill Note  Requested Prescriptions     Pending Prescriptions Disp Refills    Testosterone (AndroGel Pump) 20.25 MG/ACT (1.62%) gel 75 g 5     Sig: Apply 40.5 mg (2 pumps) to dry, intact skin of the shoulders or upper arms      Last office visit with prescribing clinician: 2/4/2025   Last telemedicine visit with prescribing clinician: Visit date not found   Next office visit with prescribing clinician: 9/16/2025         Michelle Geiger MA  06/03/25, 08:54 EDT

## 2025-06-26 RX ORDER — CHLORTHALIDONE 25 MG/1
25 TABLET ORAL DAILY
Qty: 30 TABLET | Refills: 0 | Status: SHIPPED | OUTPATIENT
Start: 2025-06-26

## 2025-07-17 DIAGNOSIS — I10 ESSENTIAL HYPERTENSION: ICD-10-CM

## 2025-07-18 RX ORDER — LISINOPRIL 10 MG/1
10 TABLET ORAL DAILY
Qty: 30 TABLET | Refills: 5 | Status: SHIPPED | OUTPATIENT
Start: 2025-07-18

## 2025-07-29 RX ORDER — CHLORTHALIDONE 25 MG/1
25 TABLET ORAL DAILY
Qty: 30 TABLET | Refills: 0 | Status: SHIPPED | OUTPATIENT
Start: 2025-07-29

## 2025-08-15 ENCOUNTER — LAB (OUTPATIENT)
Dept: LAB | Facility: HOSPITAL | Age: 65
End: 2025-08-15
Payer: COMMERCIAL

## 2025-08-15 ENCOUNTER — OFFICE VISIT (OUTPATIENT)
Dept: INTERNAL MEDICINE | Facility: CLINIC | Age: 65
End: 2025-08-15
Payer: COMMERCIAL

## 2025-08-15 VITALS
BODY MASS INDEX: 32.68 KG/M2 | HEART RATE: 64 BPM | WEIGHT: 233.4 LBS | DIASTOLIC BLOOD PRESSURE: 66 MMHG | TEMPERATURE: 97.5 F | HEIGHT: 71 IN | SYSTOLIC BLOOD PRESSURE: 100 MMHG

## 2025-08-15 DIAGNOSIS — C91.10 CLL (CHRONIC LYMPHOCYTIC LEUKEMIA): Chronic | ICD-10-CM

## 2025-08-15 DIAGNOSIS — E78.5 DYSLIPIDEMIA: Chronic | ICD-10-CM

## 2025-08-15 DIAGNOSIS — N18.31 STAGE 3A CHRONIC KIDNEY DISEASE: Chronic | ICD-10-CM

## 2025-08-15 DIAGNOSIS — L97.919 VENOUS ULCER OF RIGHT LEG: ICD-10-CM

## 2025-08-15 DIAGNOSIS — I10 ESSENTIAL HYPERTENSION: Chronic | ICD-10-CM

## 2025-08-15 DIAGNOSIS — I83.019 VENOUS ULCER OF RIGHT LEG: ICD-10-CM

## 2025-08-15 DIAGNOSIS — E11.40 TYPE 2 DIABETES MELLITUS WITH DIABETIC NEUROPATHY, WITHOUT LONG-TERM CURRENT USE OF INSULIN: Chronic | ICD-10-CM

## 2025-08-15 DIAGNOSIS — E11.40 TYPE 2 DIABETES MELLITUS WITH DIABETIC NEUROPATHY, WITHOUT LONG-TERM CURRENT USE OF INSULIN: Primary | Chronic | ICD-10-CM

## 2025-08-15 PROBLEM — R79.89 ELEVATED SERUM CREATININE: Status: RESOLVED | Noted: 2022-02-14 | Resolved: 2025-08-15

## 2025-08-15 LAB
ALBUMIN SERPL-MCNC: 4.1 G/DL (ref 3.5–5.2)
ALBUMIN/GLOB SERPL: 1.6 G/DL
ALP SERPL-CCNC: 61 U/L (ref 39–117)
ALT SERPL W P-5'-P-CCNC: 10 U/L (ref 1–41)
ANION GAP SERPL CALCULATED.3IONS-SCNC: 8.6 MMOL/L (ref 5–15)
AST SERPL-CCNC: 16 U/L (ref 1–40)
BILIRUB SERPL-MCNC: 0.4 MG/DL (ref 0–1.2)
BUN SERPL-MCNC: 20 MG/DL (ref 8–23)
BUN/CREAT SERPL: 14.1 (ref 7–25)
CALCIUM SPEC-SCNC: 9.4 MG/DL (ref 8.6–10.5)
CHLORIDE SERPL-SCNC: 104 MMOL/L (ref 98–107)
CHOLEST SERPL-MCNC: 89 MG/DL (ref 0–200)
CO2 SERPL-SCNC: 27.4 MMOL/L (ref 22–29)
CREAT SERPL-MCNC: 1.42 MG/DL (ref 0.76–1.27)
EGFRCR SERPLBLD CKD-EPI 2021: 54.5 ML/MIN/1.73
EXPIRATION DATE: ABNORMAL
GLOBULIN UR ELPH-MCNC: 2.6 GM/DL
GLUCOSE SERPL-MCNC: 133 MG/DL (ref 65–99)
HBA1C MFR BLD: 7.1 % (ref 4.5–5.7)
HDLC SERPL-MCNC: 22 MG/DL (ref 40–60)
LDLC SERPL CALC-MCNC: 48 MG/DL (ref 0–100)
LDLC/HDLC SERPL: 2.14 {RATIO}
Lab: ABNORMAL
POTASSIUM SERPL-SCNC: 4.7 MMOL/L (ref 3.5–5.2)
PROT SERPL-MCNC: 6.7 G/DL (ref 6–8.5)
SODIUM SERPL-SCNC: 140 MMOL/L (ref 136–145)
TRIGL SERPL-MCNC: 100 MG/DL (ref 0–150)
VLDLC SERPL-MCNC: 19 MG/DL (ref 5–40)

## 2025-08-15 PROCEDURE — 80061 LIPID PANEL: CPT

## 2025-08-15 PROCEDURE — 80053 COMPREHEN METABOLIC PANEL: CPT

## 2025-08-15 RX ORDER — ROSUVASTATIN CALCIUM 5 MG/1
5 TABLET, COATED ORAL DAILY
Qty: 90 TABLET | Refills: 1 | Status: SHIPPED | OUTPATIENT
Start: 2025-08-15

## 2025-08-27 RX ORDER — CHLORTHALIDONE 25 MG/1
25 TABLET ORAL DAILY
Qty: 30 TABLET | Refills: 5 | Status: SHIPPED | OUTPATIENT
Start: 2025-08-27